# Patient Record
Sex: MALE | Race: WHITE | NOT HISPANIC OR LATINO | URBAN - METROPOLITAN AREA
[De-identification: names, ages, dates, MRNs, and addresses within clinical notes are randomized per-mention and may not be internally consistent; named-entity substitution may affect disease eponyms.]

---

## 2017-03-22 ENCOUNTER — APPOINTMENT (RX ONLY)
Dept: URBAN - METROPOLITAN AREA CLINIC 51 | Facility: CLINIC | Age: 67
Setting detail: DERMATOLOGY
End: 2017-03-22

## 2017-03-22 DIAGNOSIS — L57.0 ACTINIC KERATOSIS: ICD-10-CM

## 2017-03-22 DIAGNOSIS — Z85.828 PERSONAL HISTORY OF OTHER MALIGNANT NEOPLASM OF SKIN: ICD-10-CM

## 2017-03-22 DIAGNOSIS — L81.4 OTHER MELANIN HYPERPIGMENTATION: ICD-10-CM

## 2017-03-22 PROBLEM — E05.90 THYROTOXICOSIS, UNSPECIFIED WITHOUT THYROTOXIC CRISIS OR STORM: Status: ACTIVE | Noted: 2017-03-22

## 2017-03-22 PROCEDURE — ? COUNSELING

## 2017-03-22 PROCEDURE — 99213 OFFICE O/P EST LOW 20 MIN: CPT

## 2017-03-22 PROCEDURE — ? PRESCRIPTION

## 2017-03-22 RX ORDER — FLUOROURACIL 2 G/40G
CREAM TOPICAL QHS
Qty: 1 | Refills: 1 | Status: ERX

## 2017-03-22 ASSESSMENT — LOCATION DETAILED DESCRIPTION DERM
LOCATION DETAILED: LEFT ULNAR DORSAL HAND
LOCATION DETAILED: LEFT INFERIOR CENTRAL MALAR CHEEK
LOCATION DETAILED: RIGHT ULNAR DORSAL HAND
LOCATION DETAILED: RIGHT INFERIOR LATERAL FOREHEAD
LOCATION DETAILED: RIGHT SUPERIOR FOREHEAD

## 2017-03-22 ASSESSMENT — LOCATION SIMPLE DESCRIPTION DERM
LOCATION SIMPLE: RIGHT HAND
LOCATION SIMPLE: LEFT HAND
LOCATION SIMPLE: LEFT CHEEK
LOCATION SIMPLE: RIGHT FOREHEAD

## 2017-03-22 ASSESSMENT — LOCATION ZONE DERM
LOCATION ZONE: FACE
LOCATION ZONE: HAND

## 2017-03-22 NOTE — HPI: NON-MELANOMA SKIN CANCER F/U (HISTORY OF NMSC)
What Is The Reason For Today's Visit?: Surveillance against skin cancer recurrences
How Many Skin Cancers Have You Had?: more than one
Additional History: 6 month skin check
When Was Your Last Cancer Diagnosed?: BCC - left cheek - ED&C 06/29/2010

## 2017-11-30 RX ORDER — FLUOROURACIL 2 G/40G
CREAM TOPICAL QHS
Qty: 1 | Refills: 1 | Status: ERX

## 2018-02-02 ENCOUNTER — APPOINTMENT (RX ONLY)
Dept: URBAN - METROPOLITAN AREA CLINIC 51 | Facility: CLINIC | Age: 68
Setting detail: DERMATOLOGY
End: 2018-02-02

## 2018-02-02 DIAGNOSIS — L57.0 ACTINIC KERATOSIS: ICD-10-CM

## 2018-02-02 DIAGNOSIS — Z85.828 PERSONAL HISTORY OF OTHER MALIGNANT NEOPLASM OF SKIN: ICD-10-CM

## 2018-02-02 PROCEDURE — ? LIQUID NITROGEN

## 2018-02-02 PROCEDURE — ? SEPARATE AND IDENTIFIABLE DOCUMENTATION

## 2018-02-02 PROCEDURE — ? TREATMENT REGIMEN

## 2018-02-02 PROCEDURE — 99213 OFFICE O/P EST LOW 20 MIN: CPT | Mod: 25

## 2018-02-02 PROCEDURE — 17000 DESTRUCT PREMALG LESION: CPT

## 2018-02-02 PROCEDURE — ? COUNSELING

## 2018-02-02 ASSESSMENT — LOCATION ZONE DERM
LOCATION ZONE: FACE
LOCATION ZONE: HAND

## 2018-02-02 ASSESSMENT — LOCATION DETAILED DESCRIPTION DERM
LOCATION DETAILED: RIGHT SUPERIOR FOREHEAD
LOCATION DETAILED: LEFT LATERAL MALAR CHEEK
LOCATION DETAILED: RIGHT RADIAL DORSAL HAND

## 2018-02-02 ASSESSMENT — LOCATION SIMPLE DESCRIPTION DERM
LOCATION SIMPLE: RIGHT FOREHEAD
LOCATION SIMPLE: RIGHT HAND
LOCATION SIMPLE: LEFT CHEEK

## 2018-02-02 ASSESSMENT — PAIN INTENSITY VAS: HOW INTENSE IS YOUR PAIN 0 BEING NO PAIN, 10 BEING THE MOST SEVERE PAIN POSSIBLE?: NO PAIN

## 2018-02-02 NOTE — PROCEDURE: LIQUID NITROGEN
Post-Care Instructions: I reviewed with the patient in detail post-care instructions. Patient is to wear sunprotection, and avoid picking at any of the treated lesions. Pt may apply Vaseline to crusted or scabbing areas.
Number Of Freeze-Thaw Cycles: 2 freeze-thaw cycles
Detail Level: Zone
Consent: The patient's verbal consent was obtained including but not limited to risks of crusting, scabbing, blistering, scarring, darker or lighter pigmentary change, recurrence, incomplete removal and infection.
Duration Of Freeze Thaw-Cycle (Seconds): 5
Render Post-Care Instructions In Note?: yes

## 2018-02-02 NOTE — HPI: NON-MELANOMA SKIN CANCER F/U (HISTORY OF NMSC)
What Is The Reason For Today's Visit?: Surveillance against skin cancer recurrences
How Many Skin Cancers Have You Had?: more than one
Additional History: 6 month skin check
When Was Your Last Cancer Diagnosed?: left cheek- EDC- 06/29/2010

## 2018-12-13 ENCOUNTER — APPOINTMENT (RX ONLY)
Dept: URBAN - METROPOLITAN AREA CLINIC 51 | Facility: CLINIC | Age: 68
Setting detail: DERMATOLOGY
End: 2018-12-13

## 2018-12-13 DIAGNOSIS — Z85.828 PERSONAL HISTORY OF OTHER MALIGNANT NEOPLASM OF SKIN: ICD-10-CM

## 2018-12-13 DIAGNOSIS — L57.0 ACTINIC KERATOSIS: ICD-10-CM

## 2018-12-13 PROCEDURE — 99213 OFFICE O/P EST LOW 20 MIN: CPT

## 2018-12-13 PROCEDURE — ? COUNSELING

## 2018-12-13 PROCEDURE — ? TREATMENT REGIMEN

## 2018-12-13 ASSESSMENT — LOCATION DETAILED DESCRIPTION DERM
LOCATION DETAILED: LEFT FOREHEAD
LOCATION DETAILED: RIGHT INFERIOR CENTRAL MALAR CHEEK
LOCATION DETAILED: LEFT INFERIOR CENTRAL MALAR CHEEK

## 2018-12-13 ASSESSMENT — LOCATION SIMPLE DESCRIPTION DERM
LOCATION SIMPLE: LEFT CHEEK
LOCATION SIMPLE: RIGHT CHEEK
LOCATION SIMPLE: LEFT FOREHEAD

## 2018-12-13 ASSESSMENT — PAIN INTENSITY VAS: HOW INTENSE IS YOUR PAIN 0 BEING NO PAIN, 10 BEING THE MOST SEVERE PAIN POSSIBLE?: NO PAIN

## 2018-12-13 ASSESSMENT — LOCATION ZONE DERM: LOCATION ZONE: FACE

## 2018-12-13 NOTE — HPI: NON-MELANOMA SKIN CANCER F/U (HISTORY OF NMSC)
What Is The Reason For Today's Visit?: Surveillance against skin cancer recurrences
How Many Skin Cancers Have You Had?: more than one
When Was Your Last Cancer Diagnosed?: 2010

## 2019-02-05 NOTE — HISTORY AND PHYSICAL
DATE OF SERVICE:  



COLONOSCOPY HISTORY AND PHYSICAL



HISTORY OF PRESENT ILLNESS:

The patient is a 68-year-old white male referred for screening colonoscopy by

Dr. Hodges.  He believes that he had one other colonoscopy per Dr. Watson over

10 years ago.  There is no record in our electronic medical record, probably

predates this.  He is deemed to be of average risk as he is not aware of any

family history for colon cancer or polyps and he does not believe that he had

any polyps on his earlier colonoscopy.  He denies abdominal pain, bowel habit

change, melena or bright red blood per rectum.



PAST MEDICAL HISTORY:

Significant for hypertension and hyperlipidemia.  He underwent cardiac

catheterization in 2009 and was found to have no evidence for coronary artery

disease.



PAST SURGICAL HISTORY:

He has had bilateral inguinal hernia repair.



REVIEW OF SYSTEMS:

CONSTITUTIONAL:  He denies any night sweats, chills, fever or weight change.

CARDIOVASCULAR:  He denies chest pain or discomfort, shortness of breath,

palpitations or syncope.

RESPIRATORY:  The patient denies cough, dyspnea or pedal edema.

GASTROINTESTINAL:  As stated in the HPI.



PHYSICAL EXAMINATION:

GENERAL:  Reveals a well-appearing white male, in no acute distress.

HEENT:  Unremarkable with a Mallampati class 3 oropharyngeal configuration.

VITAL SIGNS:  Blood pressure 130/80, heart rate 66 and regular.

HEENT:  Oral cavity was clear.  No exudate was noted.

CHEST:  Clear to auscultation.

CARDIOVASCULAR:  Reveals a regular rate and rhythm without murmur, S3 or S4.

ABDOMEN:  Soft, supple without mass, organomegaly or tenderness.  Bowel sounds

are positive.  No bruits are noted.  No evidence to palpation is noted for

abdominal aortic aneurysm.

EXTREMITIES:  Reveal no cyanosis, clubbing or edema.



ASSESSMENT:

The patient was set up for screening colonoscopy on 02/15/2019.  Prep

instructions with split dose prep were given and questions were answered.



I thank you for the referral of this pleasant gentleman.





Job ID: [id number]

DocumentID: [id number]

Dictated Date:  01/31/2019 16:35:29

Transcription Date: 01/31/2019 17:02:29

Dictated By: NATA ALLEN MD

## 2019-02-08 ENCOUNTER — HOSPITAL ENCOUNTER (OUTPATIENT)
Dept: HOSPITAL 75 - PREOP | Age: 69
Discharge: HOME | End: 2019-02-08
Attending: INTERNAL MEDICINE
Payer: COMMERCIAL

## 2019-02-08 VITALS — HEIGHT: 67 IN | WEIGHT: 194 LBS | BODY MASS INDEX: 30.45 KG/M2

## 2019-02-08 DIAGNOSIS — Z01.818: Primary | ICD-10-CM

## 2019-02-15 ENCOUNTER — HOSPITAL ENCOUNTER (OUTPATIENT)
Dept: HOSPITAL 75 - ENDO | Age: 69
Discharge: HOME | End: 2019-02-15
Attending: INTERNAL MEDICINE
Payer: MEDICARE

## 2019-02-15 VITALS — SYSTOLIC BLOOD PRESSURE: 121 MMHG | DIASTOLIC BLOOD PRESSURE: 71 MMHG

## 2019-02-15 VITALS — DIASTOLIC BLOOD PRESSURE: 66 MMHG | SYSTOLIC BLOOD PRESSURE: 124 MMHG

## 2019-02-15 VITALS — WEIGHT: 194 LBS | BODY MASS INDEX: 30.45 KG/M2 | HEIGHT: 67 IN

## 2019-02-15 VITALS — SYSTOLIC BLOOD PRESSURE: 128 MMHG | DIASTOLIC BLOOD PRESSURE: 86 MMHG

## 2019-02-15 DIAGNOSIS — K57.30: ICD-10-CM

## 2019-02-15 DIAGNOSIS — N40.0: ICD-10-CM

## 2019-02-15 DIAGNOSIS — I10: ICD-10-CM

## 2019-02-15 DIAGNOSIS — E78.5: ICD-10-CM

## 2019-02-15 DIAGNOSIS — Z12.11: Primary | ICD-10-CM

## 2019-02-15 NOTE — XMS REPORT
CCD document using C-CDA

 Created on: 2019



Bharat Vuong

External Reference #: [id number]

: 1950

Sex: Male



Demographics







 Address  [address] [place]  [address]

 

 Home Phone  [phone]

 

 Preferred Language  English

 

 Marital Status  

 

 [employer] Affiliation  Unknown

 

 Race  White

 

 Ethnic Group  Not  or 





Author







 Author  [facility]

 

 Organization  [facility]

 

 Address  [address] [place]  [address]



 

 Phone  [phone]







Care Team Providers







 Care Team Member Name  Role  Phone

 

  PP  Unavailable

 

  CCM  Unavailable



                                            



Summary Purpose

          Interface Exchange                                                   
                 



Insurance Providers

                      





 Payer name                    Policy type / Coverage type                    
Covered party ID                    Effective Begin Date                    
Effective End Date                

 

 [employer]                    Medicare Part B                    [id number]
                    [id number]                    Unknown                

 

 [facility]                    Medicare Part B                    
[id number]                    [id number]                    Unknown                



                                                                               
         



Family history

                      



Mother            





 Diagnosis                    Age At Onset                

 

 Skin cancer                    Unknown                



            



Father            





 Diagnosis                    Age At Onset                

 

 Cancer                    Unknown                

 

 Skin cancer                    Unknown                

 

 Hypertension                    Unknown                



                                                                               
         



Social History

                      





 Social History Element                    Codes                    Description
                    Effective Dates                

 

 Marital status                    Unknown                    

Tessa                    2017                

 

 Number of children                    Unknown                    2            
        2017                

 

 Employment                    Unknown                    Retired              
      2017                

 

 Tobacco history                    SNOMED CT: 9439309                    
Former smoker

 1978                    2017                

 

 Alcohol history                    SNOMED CT: 716615                    
Currently drinks alcohol                    2017                

 

 Frequency of drinks                    SNOMED CT: 253435536                    
1-4 drinks per week

 3 wkly                    2017                



                                                                               
                                                           



Allergies, Adverse Reactions, Alerts

                      





 Substance                    Reaction                    Codes                
    Entered Date                    Inactivated Date                    Status 
               

 

                     * NO KNOWN DRUG ALLERGIES                                 
                          Unknown                    2017                
    No Inactive Date                    Active                



                                                                              



Past Medical History

                      





 Illness                    Codes                    Condition Status          
          Onset Date                    Resolved Date                

 

                     Encounter for screening for malignant neoplasm of prostate
                                      ICD-9: V76.44

ICD-10: Z12.5                    Active                    2019          
          Unknown                

 

                     Essential (primary) hypertension                          
            ICD-9: 401.1

ICD-10: I10                    Active                    2017            
        Unknown                

 

                     Hyperglycemia, unspecified                                
      ICD-9: 790.29

ICD-10: R73.9                    Active                    2018          
          Unknown                

 

                     Mixed hyperlipidemia                                      
ICD-9: 272.2

ICD-10: E78.2                    Active                    2017          
          Unknown                



                                                                               
                                       



Problems

                      





 Condition                    Codes                    Effective Dates         
           Condition Status                

 

                     Encounter for screening for malignant neoplasm of prostate
                                      ICD-9: V76.44

ICD-10: Z12.5                    2019                    Active          
      

 

                     Essential (primary) hypertension                          
            ICD-9: 401.1

ICD-10: I10                    2017                    Active            
    

 

                     Hyperglycemia, unspecified                                
      ICD-9: 790.29

ICD-10: R73.9                    2018                    Active          
      

 

                     Mixed hyperlipidemia                                      
ICD-9: 272.2

ICD-10: E78.2                    2017                    Active          
      



                                                                               
                                       



Medications

                      





 Medication                    Codes                    Instructions           
         Start Date                    Stop Date                    Status     
               Fill Instructions                

 

                     rosuvastatin 10 mg tablet                                 
     RxNorm: 115216                    TAKE 1 TABLET DAILY                    2019                    Active               
                     

 

                     bisoprolol 10 mg-hydrochlorothiazide 6.25 mg tablet       
                               RxNorm: 076936                    1 Tablet(s) PO 
daily                    2017              
      Inactive                                    

 

                     rosuvastatin 10 mg tablet                                 
     RxNorm: 141335                    1 Tablet(s) PO daily                    
2017                    Inactive           
                         

 

                     bisoprolol 10 mg-hydrochlorothiazide 6.25 mg tablet       
                               RxNorm: 301756                    1 Tablet(s) PO 
daily                    2017                    07/10/2017              
      Inactive                                    

 

                     bisoprolol 10 mg-hydrochlorothiazide 6.25 mg tablet       
                               RxNorm: 860241                    1 Tablet(s) PO 
daily                    2017              
      Inactive                                    

 

                     rosuvastatin 10 mg tablet                                 
     RxNorm: 303151                    1 Tablet(s) PO daily                    
2017                    Inactive           
                         

 

                     rosuvastatin 10 mg tablet                                 
     RxNorm: 258808                    1 Tablet(s) PO daily                    
No Start Date                    07/10/2017                    Inactive        
                            



                                                                               
                                                           



Medication Administered

          No Medication Administered data                                      
                              



Immunizations

          No Immunization data                                                 
         



Assessments

                      





 Condition                    Codes                    Effective Dates         
       

 

 Encounter for screening for malignant neoplasm of prostate                    
ICD-10: Z12.5

ICD-9: V76.44                    2019                

 

 Essential (primary) hypertension                    ICD-10: I10

ICD-9: 401.1                    2019                

 

 Mixed hyperlipidemia                    ICD-10: E78.2

ICD-9: 272.2                    2019                

 

 Hyperglycemia, unspecified                    ICD-10: R73.9

ICD-9: 790.29                    2018                



                                                                    



Reason For Visit

                      





 Reason For Visit                    Effective Dates                    Notes  
              

 

 hypertension                    2018                                    

 

 hypertension                    2018                                    

 

 hypertension                    2017                                    



                                                                              



Results

                      





 Observation                    Observation Code                    Item       
             Item Code                    Result                    Date       
         

 

 Comp Metabolic                    Yet204                    NA                
                        140 mEq/L                    2018                

 

 Comp Metabolic                    Rtv483                    K                 
                       5.1 mEq/L                    2018                

 

 Comp Metabolic                    Zoj230                    CL                
                        103 mEq/L                    2018                

 

 Comp Metabolic                    Odc283                    CO2               
                         31.0 mEq/L                    2018              
  

 

 Comp Metabolic                    Onz230                    ANION GAP         
                               11                     2018                

 

 Comp Metabolic                    Kuw704                    GLUCOSE           
                             134 mg/dL                    2018           
     

 

 Comp Metabolic                    Crq378                    Creat             
                           1.0 mg/dL                    2018             
   

 

 Comp Metabolic                    Dmn900                    eGFR              
                          84 ml/min/1.73m2                    2018       
         

 

 Comp Metabolic                    Ett033                    BUN               
                         22 mg/dL                    2018                

 

 Comp Metabolic                    Ffk811                    B/C Ratio         
                               23.2 Ratio                    2018        
        

 

 Comp Metabolic                    Xtt887                    CALCIUM           
                             9.6 mg/dL                    2018           
     

 

 Comp Metabolic                    Sbv989                    ALK PHOS          
                              37 U/L                    2018             
   

 

 Comp Metabolic                    Vdm543                    AST(SGOT)         
                               19 U/L                    2018            
    

 

 Comp Metabolic                    Xdm975                    ALT(SGPT)         
                               28 U/L                    2018            
    

 

 Comp Metabolic                    Yge404                    BILI T            
                            0.8 mg/dL                    2018            
    

 

 Comp Metabolic                    Owr344                    ALBUMIN           
                             4.4 g/dL                    2018            
    

 

 Comp Metabolic                    Sbc991                    TPRO              
                          6.8 g/dL                    2018                

 

 Comp Metabolic                    Txl930                    GLOB              
                          2.4 g/dL                    2018                

 

 Comp Metabolic                    Qrs258                    A/G Ratio         
                               1.8 Ratio                    2018         
       

 

 Comp Metabolic                    Xpe552                    Osmo              
                          285 mOsmo                    2018              
  

 

 Cbc With Differential                    Ord2                    WBC          
                              5.78 K/ul                    2018          
      

 

 Cbc With Differential                    Ord2                    RBC          
                              4.89 M/ul                    2018          
      

 

 Cbc With Differential                    Ord2                    HGB          
                              16.5 g/dl                    2018          
      

 

 Cbc With Differential                    Ord2                    HCT          
                              45.7 %                    2018             
   

 

 Cbc With Differential                    Ord2                    Neut%        
                                56.2 %                    2018           
     

 

 Cbc With Differential                    Ord2                    Lymph%       
                                 32.9 %                    2018          
      

 

 Cbc With Differential                    Ord2                    MCV          
                              93.5 fl                    2018            
    

 

 Cbc With Differential                    Ord2                    MCH          
                              33.7 pg                    2018            
    

 

 Cbc With Differential                    Ord2                    Mono%        
                                9.3 %                    2018            
    

 

 Cbc With Differential                    Ord2                    MCHC         
                               36.1 pg                    2018           
     

 

 Cbc With Differential                    Ord2                    Eos%         
                               1.4 %                    2018             
   

 

 Cbc With Differential                    Ord2                    PLT          
                              194 K/ul                    2018           
     

 

 Cbc With Differential                    Ord2                    Baso%        
                                0.2 %                    2018            
    

 

 Cbc With Differential                    Ord2                    Neut ABS#    
                                    3.25 K/ul                    2018    
            

 

 Cbc With Differential                    Ord2                    RDW          
                              12.2 %                    2018             
   

 

 Cbc With Differential                    Ord2                    Lymph ABS#   
                                     1.90 K/ul                    2018   
             

 

 Cbc With Differential                    Ord2                    Mono ABS#    
                                    0.5 K/ul                    2018     
           

 

 Cbc With Differential                    Ord2                    Eos ABS#     
                                   0.1 K/ul                    2018      
          

 

 Cbc With Differential                    Ord2                    Baso ABS#    
                                    0.0 K/ul                    2018     
           

 

 Lipid                    Ord30                    CHOL                        
                147 mg/dL                    2018                

 

 Lipid                    Ord30                    HDL                         
               50.0 mg/dl                    2018                

 

 Lipid                    Ord30                    TRIG                        
                91 mg/dL                    2018                

 

 Lipid                    Ord30                    LDL                         
               79 mg/dL                    2018                

 

 Lipid                    Ord30                    C/HDL                       
                 2.9 Ratio                    2018                

 

 %Hba1C                    Vge357                    % HbA1c                    
02746-5                    5.5 %                    2018                

 

 %Hba1C                    Was020                    Gluc Ave                  
                      111 mg/dL                    2018                

 

 Lipid                    Ord30                    CHOL                        
                154 mg/dL                    2018                

 

 Lipid                    Ord30                    HDL                         
               49.0 mg/dl                    2018                

 

 Lipid                    Ord30                    TRIG                        
                104 mg/dL                    2018                

 

 Lipid                    Ord30                    LDL                         
               84 mg/dL                    2018                

 

 Lipid                    Ord30                    C/HDL                       
                 3.1 Ratio                    2018                

 

 Tsh                    Ord6                    hTSH II                        
                2.76 uIU/mL                    2018                

 

 Comp Metabolic                    Eya354                    NA                
                        141 mEq/L                    2018                

 

 Comp Metabolic                    Zut169                    K                 
                       4.7 mEq/L                    2018                

 

 Comp Metabolic                    Yfr708                    CL                
                        103 mEq/L                    2018                

 

 Comp Metabolic                    Pfe677                    CO2               
                         31.0 mEq/L                    2018              
  

 

 Comp Metabolic                    Emf227                    ANION GAP         
                               12                     2018                

 

 Comp Metabolic                    Zgu209                    GLUCOSE           
                             135 mg/dL                    2018           
     

 

 Comp Metabolic                    Usu021                    Creat             
                           0.9 mg/dL                    2018             
   

 

 Comp Metabolic                    Jvk860                    eGFR              
                          90 ml/min/1.73m2                    2018       
         

 

 Comp Metabolic                    Aji325                    BUN               
                         16 mg/dL                    2018                

 

 Comp Metabolic                    Cus004                    B/C Ratio         
                               17.8 Ratio                    2018        
        

 

 Comp Metabolic                    Lyr292                    CALCIUM           
                             9.7 mg/dL                    2018           
     

 

 Comp Metabolic                    Xmo224                    ALK PHOS          
                              42 U/L                    2018             
   

 

 Comp Metabolic                    Ajm030                    AST(SGOT)         
                               24 U/L                    2018            
    

 

 Comp Metabolic                    Ecu162                    ALT(SGPT)         
                               36 U/L                    2018            
    

 

 Comp Metabolic                    Xqb265                    BILI T            
                            1.1 mg/dL                    2018            
    

 

 Comp Metabolic                    Mjt409                    ALBUMIN           
                             4.5 g/dL                    2018            
    

 

 Comp Metabolic                    Jks437                    TPRO              
                          6.7 g/dL                    2018                

 

 Comp Metabolic                    Zal122                    GLOB              
                          2.3 g/dL                    2018                

 

 Comp Metabolic                    Equ884                    A/G Ratio         
                               2.0 Ratio                    2018         
       

 

 Comp Metabolic                    Fan501                    Osmo              
                          284 mOsmo                    2018              
  

 

 Cbc With Differential                    Ord2                    WBC          
                              5.15 K/ul                    2018          
      

 

 Cbc With Differential                    Ord2                    RBC          
                              4.75 M/ul                    2018          
      

 

 Cbc With Differential                    Ord2                    HGB          
                              16.1 g/dl                    2018          
      

 

 Cbc With Differential                    Ord2                    HCT          
                              44.6 %                    2018             
   

 

 Cbc With Differential                    Ord2                    Neut%        
                                55.7 %                    2018           
     

 

 Cbc With Differential                    Ord2                    MCV          
                              93.9 fl                    2018            
    

 

 Cbc With Differential                    Ord2                    Lymph%       
                                 34.2 %                    2018          
      

 

 Cbc With Differential                    Ord2                    Mono%        
                                8.0 %                    2018            
    

 

 Cbc With Differential                    Ord2                    MCH          
                              33.9 pg                    2018            
    

 

 Cbc With Differential                    Ord2                    Eos%         
                               1.7 %                    2018             
   

 

 Cbc With Differential                    Ord2                    MCHC         
                               36.1 pg                    2018           
     

 

 Cbc With Differential                    Ord2                    Baso%        
                                0.4 %                    2018            
    

 

 Cbc With Differential                    Ord2                    PLT          
                              182 K/ul                    2018           
     

 

 Cbc With Differential                    Ord2                    Neut ABS#    
                                    2.87 K/ul                    2018    
            

 

 Cbc With Differential                    Ord2                    RDW          
                              12.4 %                    2018             
   

 

 Cbc With Differential                    Ord2                    Lymph ABS#   
                                     1.76 K/ul                    2018   
             

 

 Cbc With Differential                    Ord2                    Mono ABS#    
                                    0.4 K/ul                    2018     
           

 

 Cbc With Differential                    Ord2                    Eos ABS#     
                                   0.1 K/ul                    2018      
          

 

 Cbc With Differential                    Ord2                    Bas[facility] ABS#    
                                    0.0 K/ul                    2018     
           

 

 %Hba1C                    [id number]                    % HbA1c                    
84189-6                    5.3 %                    2018                

 

 %Hba1C                    [id number]                    Gluc Ave                  
                      105 mg/dL                    2018                



                                                                               
                                                                               



Review of Systems

                      





 System                    Result                    Effective Dates           
     

 

 Constitutional                    No recent illness                    2018                

 

 Constitutional                    No chills                    2018     
           

 

 Constitutional                    No fatigue                    2018    
            

 

 Constitutional                    No fever                    2018      
          

 

 Constitutional                    No insomnia                    2018   
             

 

 Constitutional                    No malaise                    2018    
            

 

 Eyes                    No vision change                    2018        
        

 

 Ears/Nose/Throat/Neck                    No dental pain                    2018                

 

 Ears/Nose/Throat/Neck                    No dizziness                    2018                

 

 Ears/Nose/Throat/Neck                    No dysphagia                    2018                

 

 Ears/Nose/Throat/Neck                    No headache                    2018                

 

 Ears/Nose/Throat/Neck                    No hearing loss                    2018                

 

 Ears/Nose/Throat/Neck                    No nasal allergies                    
2018                

 

 Ears/Nose/Throat/Neck                    No sore throat                    2018                

 

 Ears/Nose/Throat/Neck                    No postnasal drip                    
2018                

 

 Ears/Nose/Throat/Neck                    No sinus congestion                  
  2018                

 

 Cardiovascular                    No chest pain/pressure                    2018                

 

 Cardiovascular                    No dyspnea                    2018    
            

 

 Cardiovascular                    No edema                    2018      
          

 

 Cardiovascular                    No exercise intolerance                                    

 

 Cardiovascular                    No fatigue                    2018    
            

 

 Cardiovascular                    No near-syncope/dizziness                    
2018                

 

 Respiratory                    No chest tightness                    2018                

 

 Respiratory                    No cough                    2018         
       

 

 Respiratory                    No dyspnea                    2018       
         

 

 Respiratory                    No pedal edema                    2018   
             

 

 Gastrointestinal                    No abdominal pain                    2018                

 

 Gastrointestinal                    No constipation                    2018                

 

 Gastrointestinal                    No diarrhea                    2018 
               

 

 Gastrointestinal                    No gastroesophageal reflux                
    2018                

 

 Gastrointestinal                    No nausea                    2018   
             

 

 Gastrointestinal                    No vomiting                    2018 
               

 

 Genitourinary/Nephrology                    No dysuria                                    

 

 Genitourinary/Nephrology                    No nocturia                    2018                

 

 Genitourinary/Nephrology                    No urinary incontinence           
         2018                

 

 Musculoskeletal                    No stiffness                    2018 
               

 

 Musculoskeletal                    No swelling                    2018  
              

 

 Musculoskeletal                    No muscle weakness                    2018                

 

 Musculoskeletal                    No myalgias                    2018  
              

 

 Dermatologic                    No rash                    2018         
       

 

 Dermatologic                    No sores                    2018        
        

 

 Neurologic                    No dizziness                    2018      
          

 

 Neurologic                    No headache                    2018       
         

 

 Neurologic                    No neck pain                    2018      
          

 

 Neurologic                    No syncope                    2018        
        

 

 Psychiatric                    No anxiety                    2018       
         

 

 Psychiatric                    No depression                    2018    
            

 

 Constitutional                    No recent illness                    2018                

 

 Constitutional                    No chills                    2018     
           

 

 Constitutional                    No fatigue                    2018    
            

 

 Constitutional                    No fever                    2018      
          

 

 Constitutional                    No insomnia                    2018   
             

 

 Constitutional                    No malaise                    2018    
            

 

 Eyes                    No vision change                    2018        
        

 

 Ears/Nose/Throat/Neck                    No dental pain                    2018                

 

 Ears/Nose/Throat/Neck                    No dizziness                    2018                

 

 Ears/Nose/Throat/Neck                    No dysphagia                    2018                

 

 Ears/Nose/Throat/Neck                    No headache                    2018                

 

 Ears/Nose/Throat/Neck                    No hearing loss                    2018                

 

 Ears/Nose/Throat/Neck                    No nasal allergies                    
2018                

 

 Ears/Nose/Throat/Neck                    No sore throat                    2018                

 

 Ears/Nose/Throat/Neck                    No postnasal drip                    
2018                

 

 Ears/Nose/Throat/Neck                    No sinus congestion                  
  2018                

 

 Cardiovascular                    No chest pain/pressure                    2018                

 

 Cardiovascular                    No dyspnea                    2018    
            

 

 Cardiovascular                    No edema                    2018      
          

 

 Cardiovascular                    No exercise intolerance                                    

 

 Cardiovascular                    No fatigue                    2018    
            

 

 Cardiovascular                    No near-syncope/dizziness                    
2018                

 

 Respiratory                    No chest tightness                    2018                

 

 Respiratory                    No cough                    2018         
       

 

 Respiratory                    No dyspnea                    2018       
         

 

 Respiratory                    No pedal edema                    2018   
             

 

 Gastrointestinal                    No abdominal pain                    2018                

 

 Gastrointestinal                    No constipation                    2018                

 

 Gastrointestinal                    No diarrhea                    2018 
               

 

 Gastrointestinal                    No gastroesophageal reflux                
    2018                

 

 Gastrointestinal                    No nausea                    2018   
             

 

 Gastrointestinal                    No vomiting                    2018 
               

 

 Genitourinary/Nephrology                    No dysuria                                    

 

 Genitourinary/Nephrology                    No nocturia                    2018                

 

 Genitourinary/Nephrology                    No urinary incontinence           
         2018                

 

 Musculoskeletal                    No stiffness                    2018 
               

 

 Musculoskeletal                    No swelling                    2018  
              

 

 Musculoskeletal                    No muscle weakness                    2018                

 

 Musculoskeletal                    No myalgias                    2018  
              

 

 Dermatologic                    No rash                    2018         
       

 

 Dermatologic                    No sores                    2018        
        

 

 Neurologic                    No dizziness                    2018      
          

 

 Neurologic                    No headache                    2018       
         

 

 Neurologic                    No neck pain                    2018      
          

 

 Neurologic                    No syncope                    2018        
        

 

 Psychiatric                    No anxiety                    2018       
         

 

 Psychiatric                    No depression                    2018    
            

 

 Constitutional                    No recent illness                    2017                

 

 Constitutional                    No chills                    2017     
           

 

 Constitutional                    No fatigue                    2017    
            

 

 Constitutional                    No fever                    2017      
          

 

 Constitutional                    No insomnia                    2017   
             

 

 Constitutional                    No malaise                    2017    
            

 

 Eyes                    No vision change                    2017        
        

 

 Ears/Nose/Throat/Neck                    No dental pain                    2017                

 

 Ears/Nose/Throat/Neck                    No dizziness                    2017                

 

 Ears/Nose/Throat/Neck                    No dysphagia                    2017                

 

 Ears/Nose/Throat/Neck                    No headache                    2017                

 

 Ears/Nose/Throat/Neck                    No hearing loss                    2017                

 

 Ears/Nose/Throat/Neck                    No nasal allergies                    
2017                

 

 Ears/Nose/Throat/Neck                    No sore throat                    2017                

 

 Ears/Nose/Throat/Neck                    No postnasal drip                    
2017                

 

 Ears/Nose/Throat/Neck                    No sinus congestion                  
  2017                

 

 Cardiovascular                    No chest pain/pressure                    2017                

 

 Cardiovascular                    No dyspnea                    2017    
            

 

 Cardiovascular                    No edema                    2017      
          

 

 Cardiovascular                    No exercise intolerance                                    

 

 Cardiovascular                    No fatigue                    2017    
            

 

 Cardiovascular                    No near-syncope/dizziness                    
2017                

 

 Respiratory                    No chest tightness                    2017                

 

 Respiratory                    No cough                    2017         
       

 

 Respiratory                    No dyspnea                    2017       
         

 

 Respiratory                    No pedal edema                    2017   
             

 

 Gastrointestinal                    No abdominal pain                    2017                

 

 Gastrointestinal                    No constipation                    2017                

 

 Gastrointestinal                    No diarrhea                    2017 
               

 

 Gastrointestinal                    No gastroesophageal reflux                
    2017                

 

 Gastrointestinal                    No nausea                    2017   
             

 

 Gastrointestinal                    No vomiting                    2017 
               

 

 Genitourinary/Nephrology                    No dysuria                                    

 

 Genitourinary/Nephrology                    No nocturia                    2017                

 

 Genitourinary/Nephrology                    No urinary incontinence           
         2017                

 

 Musculoskeletal                    No stiffness                    2017 
               

 

 Musculoskeletal                    No swelling                    2017  
              

 

 Musculoskeletal                    No muscle weakness                    2017                

 

 Musculoskeletal                    No myalgias                    2017  
              

 

 Dermatologic                    No rash                    2017         
       

 

 Dermatologic                    No sores                    2017        
        

 

 Neurologic                    No dizziness                    2017      
          

 

 Neurologic                    No headache                    2017       
         

 

 Neurologic                    No neck pain                    2017      
          

 

 Neurologic                    No syncope                    2017        
        

 

 Psychiatric                    No anxiety                    2017       
         

 

 Psychiatric                    No depression                    2017    
            



                                                                    



Physical Exam

                      





 Exam Name                    System Name                    Item Name         
           Status                    Result                    Effective Dates 
                   Notes                

 

 Full Exam - General                     Constitutional                     
general appearance                    Development:                    well 
developed                    2018                    None                

 

 Full Exam - General                     Constitutional                     
general appearance                    Development:                    appears 
stated age                    2018                    None                

 

 Full Exam - General                     Constitutional                     
general appearance                    Hygiene/Attention to Grooming:           
         good hygiene                    2018                    None    
            

 

 Full Exam - General                     Eyes                    conjunctiva
/eyelids                    Overall:                    conjunctiva clear      
              2018                    None                

 

 Full Exam - General                     Eyes                    conjunctiva
/eyelids                    Overall:                    cornea clear           
         2018                    None                

 

 Full Exam - General                     Eyes                    conjunctiva
/eyelids                    Overall:                    eyelids normal         
           2018                    None                

 

 Full Exam - General                     Eyes                    pupils and 
irises                    Overall:                    pupils equal, round, 
reactive to light and accomodation                    2018               
     None                

 

 Full Exam - General                     Ears/Nose/Throat                  
  otoscopic exam                    Overall:                    external 
auditory canals clear                    2018                    None    
            

 

 Full Exam - General                     Ears/Nose/Throat                  
  otoscopic exam                    Overall:                    tympanic 
membranes clear                    2018                    None          
      

 

 Full Exam - General                     Ears/Nose/Throat                  
  lips/teeth/gingiva                    Overall:                    benign lips
                    2018                    None                

 

 Full Exam - General                     Ears/Nose/Throat                  
  lips/teeth/gingiva                    Overall:                    normal 
dentition                    2018                    None                

 

 Full Exam - General                     Ears/Nose/Throat                  
  oral cavity/pharynx/larynx                     Overall:                    
oral mucosa clear                    2018                    None        
        

 

 Full Exam - General 1993                    Ears/Nose/Throat                  
  oral cavity/pharynx/larynx                     Overall:                    
oropharyngeal mucosa clear                    2018                    
None                

 

 Full Exam - General                     Ears/Nose/Throat                  
  oral cavity/pharynx/larynx                     Overall:                    
hypopharynx benign                    2018                    None       
         

 

 Full Exam - General                     Ears/Nose/Throat                  
  oral cavity/pharynx/larynx                     Overall:                    no 
masses                    2018                    None                

 

 Full Exam - General                     Respiratory                    
auscultation                    Overall:                    breath sounds clear 
bilaterally                    2018                    None              
  

 

 Full Exam - General                     Respiratory                    
respiratory effort/rhythm                    Overall:                    no 
retractions                    2018                    None              
  

 

 Full Exam - General                     Respiratory                    
respiratory effort/rhythm                    Overall:                    normal 
rate                    2018                    None                

 

 Full Exam - General                     Cardiovascular                    
extremities                    Overall:                    no clubbing         
           2018                    None                

 

 Full Exam - General                     Cardiovascular                    
auscultation of heart                    Overall:                    regular 
rate                    2018                    None                

 

 Full Exam - General                     Cardiovascular                    
auscultation of heart                    Overall:                    normal 
heart sounds                    2018                    None             
   

 

 Full Exam - General                     Abdomen                    
abdominal exam                    Overall:                    no tenderness    
                2018                    None                

 

 Full Exam - General                     Abdomen                    
abdominal exam                    Overall:                    normal bowel 
sounds                    2018                    None                

 

 Full Exam - General                     Lymphatic                    neck 
nodes                    Overall:                    anterior cervical chain 
benign                    2018                    None                

 

 Full Exam - General                     Lymphatic                    neck 
nodes                    Overall:                    posterior cervical chain 
benign                    2018                    None                

 

 Full Exam - General                     Musculoskeletal                    
spine, ribs and pelvis                    Overall:                    spine 
benign                    2018                    None                

 

 Full Exam - General                     Musculoskeletal                    
spine, ribs and pelvis                    Overall:                    
sacroiliac joint benign                    2018                    None  
              

 

 Full Exam - General                     Musculoskeletal                    
spine, ribs and pelvis                    Overall:                    good 
posture                    2018                    None                

 

 Full Exam - General                     Musculoskeletal                    
head and neck                    Overall:                    head atraumatic   
                 2018                    None                

 

 Full Exam - General                     Musculoskeletal                    
head and neck                    Overall:                    cervical spine 
benign                    2018                    None                

 

 Full Exam - General                     Integument                    
inspection of skin                    Overall:                    few scattered 
moles, no gross abnormalities                    2018                    
None                

 

 Full Exam - General                     Neurologic                    deep 
tendon reflexes                    Overall:                    deep tendon 
reflexes intact                    2018                    None          
      

 

 Full Exam - General                     Neurologic                    
cranial nerves                    Overall:                    crainial nerves 2 
- 12 grossly intact                    2018                    None      
          

 

 Full Exam - General                     Psychiatric                    
orientation/consciousness                    Overall:                    
oriented to person, place and time                    2018               
     None                

 

 Full Exam - General                     Psychiatric                    
mood and affect                    Overall:                    normal mood and 
affect                    2018                    None                

 

 Full Exam - General                     Constitutional                     
general appearance                    Development:                    well 
developed                    2018                    None                

 

 Full Exam - General                     Constitutional                     
general appearance                    Development:                    appears 
stated age                    2018                    None                

 

 Full Exam - General                     Constitutional                     
general appearance                    Hygiene/Attention to Grooming:           
         good hygiene                    2018                    None    
            

 

 Full Exam - General                     Eyes                    conjunctiva
/eyelids                    Overall:                    conjunctiva clear      
              2018                    None                

 

 Full Exam - General                     Eyes                    conjunctiva
/eyelids                    Overall:                    cornea clear           
         2018                    None                

 

 Full Exam - General                     Eyes                    conjunctiva
/eyelids                    Overall:                    eyelids normal         
           2018                    None                

 

 Full Exam - General                     Eyes                    pupils and 
irises                    Overall:                    pupils equal, round, 
reactive to light and accomodation                    2018               
     None                

 

 Full Exam - General                     Ears/Nose/Throat                  
  otoscopic exam                    Overall:                    external 
auditory canals clear                    2018                    None    
            

 

 Full Exam - General                     Ears/Nose/Throat                  
  otoscopic exam                    Overall:                    tympanic 
membranes clear                    2018                    None          
      

 

 Full Exam - General                     Ears/Nose/Throat                  
  lips/teeth/gingiva                    Overall:                    benign lips
                    2018                    None                

 

 Full Exam - General                     Ears/Nose/Throat                  
  lips/teeth/gingiva                    Overall:                    normal 
dentition                    2018                    None                

 

 Full Exam - General                     Ears/Nose/Throat                  
  oral cavity/pharynx/larynx                     Overall:                    
oral mucosa clear                    2018                    None        
        

 

 Full Exam - General                     Ears/Nose/Throat                  
  oral cavity/pharynx/larynx                     Overall:                    
oropharyngeal mucosa clear                    2018                    
None                

 

 Full Exam - General                     Ears/Nose/Throat                  
  oral cavity/pharynx/larynx                     Overall:                    
hypopharynx benign                    2018                    None       
         

 

 Full Exam - General                     Ears/Nose/Throat                  
  oral cavity/pharynx/larynx                     Overall:                    no 
masses                    2018                    None                

 

 Full Exam - General                     Respiratory                    
auscultation                    Overall:                    breath sounds clear 
bilaterally                    2018                    None              
  

 

 Full Exam - General                     Respiratory                    
respiratory effort/rhythm                    Overall:                    no 
retractions                    2018                    None              
  

 

 Full Exam - General                     Respiratory                    
respiratory effort/rhythm                    Overall:                    normal 
rate                    2018                    None                

 

 Full Exam - General                     Cardiovascular                    
extremities                    Overall:                    no clubbing         
           2018                    None                

 

 Full Exam - General                     Cardiovascular                    
auscultation of heart                    Overall:                    regular 
rate                    2018                    None                

 

 Full Exam - General                     Cardiovascular                    
auscultation of heart                    Overall:                    normal 
heart sounds                    2018                    None             
   

 

 Full Exam - General                     Abdomen                    
abdominal exam                    Overall:                    no tenderness    
                2018                    None                

 

 Full Exam - General                     Abdomen                    
abdominal exam                    Overall:                    normal bowel 
sounds                    2018                    None                

 

 Full Exam - General                     Lymphatic                    neck 
nodes                    Overall:                    anterior cervical chain 
benign                    2018                    None                

 

 Full Exam - General                     Lymphatic                    neck 
nodes                    Overall:                    posterior cervical chain 
benign                    2018                    None                

 

 Full Exam - General                     Musculoskeletal                    
spine, ribs and pelvis                    Overall:                    spine 
benign                    2018                    None                

 

 Full Exam - General                     Musculoskeletal                    
spine, ribs and pelvis                    Overall:                    
sacroiliac joint benign                    2018                    None  
              

 

 Full Exam - General                     Musculoskeletal                    
spine, ribs and pelvis                    Overall:                    good 
posture                    2018                    None                

 

 Full Exam - General                     Musculoskeletal                    
head and neck                    Overall:                    head atraumatic   
                 2018                    None                

 

 Full Exam - General                     Musculoskeletal                    
head and neck                    Overall:                    cervical spine 
benign                    2018                    None                

 

 Full Exam - General                     Integument                    
inspection of skin                    Overall:                    few scattered 
moles, no gross abnormalities                    2018                    
None                

 

 Full Exam - General                     Neurologic                    deep 
tendon reflexes                    Overall:                    deep tendon 
reflexes intact                    2018                    None          
      

 

 Full Exam - General                     Neurologic                    
cranial nerves                    Overall:                    crainial nerves 2 
- 12 grossly intact                    2018                    None      
          

 

 Full Exam - General                     Psychiatric                    
orientation/consciousness                    Overall:                    
oriented to person, place and time                    2018               
     None                

 

 Full Exam - General                     Psychiatric                    
mood and affect                    Overall:                    normal mood and 
affect                    2018                    None                

 

 Full Exam - General                     Constitutional                     
general appearance                    Development:                    well 
developed                    2017                    None                

 

 Full Exam - General                     Constitutional                     
general appearance                    Development:                    appears 
stated age                    2017                    None                

 

 Full Exam - General                     Constitutional                     
general appearance                    Hygiene/Attention to Grooming:           
         good hygiene                    2017                    None    
            

 

 Full Exam - General                     Eyes                    conjunctiva
/eyelids                    Overall:                    conjunctiva clear      
              2017                    None                

 

 Full Exam - General                     Eyes                    conjunctiva
/eyelids                    Overall:                    cornea clear           
         2017                    None                

 

 Full Exam - General                     Eyes                    conjunctiva
/eyelids                    Overall:                    eyelids normal         
           2017                    None                

 

 Full Exam - General                     Eyes                    pupils and 
irises                    Overall:                    pupils equal, round, 
reactive to light and accomodation                    2017               
     None                

 

 Full Exam - General                     Ears/Nose/Throat                  
  otoscopic exam                    Overall:                    external 
auditory canals clear                    2017                    None    
            

 

 Full Exam - General                     Ears/Nose/Throat                  
  otoscopic exam                    Overall:                    tympanic 
membranes clear                    2017                    None          
      

 

 Full Exam - General                     Ears/Nose/Throat                  
  lips/teeth/gingiva                    Overall:                    benign lips
                    2017                    None                

 

 Full Exam - General                     Ears/Nose/Throat                  
  lips/teeth/gingiva                    Overall:                    normal 
dentition                    2017                    None                

 

 Full Exam - General                     Ears/Nose/Throat                  
  oral cavity/pharynx/larynx                     Overall:                    
oral mucosa clear                    2017                    None        
        

 

 Full Exam - General                     Ears/Nose/Throat                  
  oral cavity/pharynx/larynx                     Overall:                    
oropharyngeal mucosa clear                    2017                    
None                

 

 Full Exam - General                     Ears/Nose/Throat                  
  oral cavity/pharynx/larynx                     Overall:                    
hypopharynx benign                    2017                    None       
         

 

 Full Exam - General                     Ears/Nose/Throat                  
  oral cavity/pharynx/larynx                     Overall:                    no 
masses                    2017                    None                

 

 Full Exam - General                     Respiratory                    
auscultation                    Overall:                    breath sounds clear 
bilaterally                    2017                    None              
  

 

 Full Exam - General                     Respiratory                    
respiratory effort/rhythm                    Overall:                    no 
retractions                    2017                    None              
  

 

 Full Exam - General                     Respiratory                    
respiratory effort/rhythm                    Overall:                    normal 
rate                    2017                    None                

 

 Full Exam - General                     Cardiovascular                    
extremities                    Overall:                    no clubbing         
           2017                    None                

 

 Full Exam - General                     Cardiovascular                    
auscultation of heart                    Overall:                    regular 
rate                    2017                    None                

 

 Full Exam - General                     Cardiovascular                    
auscultation of heart                    Overall:                    normal 
heart sounds                    2017                    None             
   

 

 Full Exam - General                     Abdomen                    
abdominal exam                    Overall:                    no tenderness    
                2017                    None                

 

 Full Exam - General                     Abdomen                    
abdominal exam                    Overall:                    normal bowel 
sounds                    2017                    None                

 

 Full Exam - General                     Lymphatic                    neck 
nodes                    Overall:                    anterior cervical chain 
benign                    2017                    None                

 

 Full Exam - General                     Lymphatic                    neck 
nodes                    Overall:                    posterior cervical chain 
benign                    2017                    None                

 

 Full Exam - General                     Musculoskeletal                    
spine, ribs and pelvis                    Overall:                    spine 
benign                    2017                    None                

 

 Full Exam - General                     Musculoskeletal                    
spine, ribs and pelvis                    Overall:                    
sacroiliac joint benign                    2017                    None  
              

 

 Full Exam - General                     Musculoskeletal                    
spine, ribs and pelvis                    Overall:                    good 
posture                    2017                    None                

 

 Full Exam - General                     Musculoskeletal                    
head and neck                    Overall:                    head atraumatic   
                 2017                    None                

 

 Full Exam - General                     Musculoskeletal                    
head and neck                    Overall:                    cervical spine 
benign                    2017                    None                

 

 Full Exam - General                     Integument                    
inspection of skin                    Overall:                    few scattered 
moles, no gross abnormalities                    2017                    
None                

 

 Full Exam - General                     Neurologic                    deep 
tendon reflexes                    Overall:                    deep tendon 
reflexes intact                    2017                    None          
      

 

 Full Exam - General                     Neurologic                    
cranial nerves                    Overall:                    crainial nerves 2 
- 12 grossly intact                    2017                    None      
          

 

 Full Exam - General                     Psychiatric                    
orientation/consciousness                    Overall:                    
oriented to person, place and time                    2017               
     None                

 

 Full Exam - General                     Psychiatric                    
mood and affect                    Overall:                    normal mood and 
affect                    2017                    None                



                                                                              



Procedures

          No Procedures data                                                   
                 



Vital Signs

                      





 Date                    Vital                









 2018                                        Blood Pressure 1: 120/78 Code
: 8480-6                                                          BMI: 29.4 Code
: 29195-2                                                          Heart Rate 1
: 60 bpm                                                          Height: 5'7" 
                                                         SpO2: 98%             
                                             Weight: 188 lbs                   
                

 

 2018                                        Blood Pressure 1: 122/70 Code
: 8480-6                                                          BMI: 29.1 Code
: 85138-4                                                          Heart Rate 1
: 66 bpm                                                          Height: 5'7" 
                                                         SpO2: 96%             
                                             Weight: 186 lbs                   
                

 

 2017                                        Blood Pressure 1: 132/70 Code
: 8480-6                                                          BMI: 29.9 Code
: 98536-4                                                          Heart Rate 1
: 55 bpm                                                          Height: 5'7" 
                                                         SpO2: 96%             
                                             Weight: 191 lbs                   
                



                                                                               
                             



Functional Status

          No Functional Status data                                            
              



History of Present Illness

                      





 Symptom Name                    Status                    Result              
      Effective Date                    Notes                

 

 hypertension                    Onset and Resolution                    
ongoing                    2018                    None                

 

 hypertension                    Blood Pressure Values                    
patient checking blood pressure at home - did not bring in readings            
        2018                    None                

 

 hypertension                    Pertinent Findings                    Denies 
dizziness                    2018                    None                

 

 hypertension                    Pertinent Findings                    Denies 
dyspnea                    2018                    None                

 

 hypertension                    Pertinent Findings                    Denies 
edema                    2018                    None                

 

 hypertension                    Quality                    chronic            
        2018                    None                

 

 hypertension                    Quality                    primary 
hypertension                    2018                    None             
   

 

 hypertension                    Onset of Symptom                    during 
adulthood                    2018                    None                

 

 hypertension                    Alleviating Factors                    
medication                    2018                    None                

 

 hyperlipidemia                    Onset and Resolution                    
gradual in onset                    2018                    None         
       

 

 hyperlipidemia                    Onset of Symptom                    during 
adulthood                    2018                    None                

 

 hyperlipidemia                    Alleviating Factors                    
medication                    2018                    None                

 

 hyperlipidemia                    Exacerbating Factors                    diet
                    2018                    None                

 

 hyperlipidemia                    Significant Medications                    
statin                    2018                    None                

 

 hypertension                    Quality                    stable             
       2018                    None                

 

 hypertension                    Quality                    intermittent       
             2018                    None                

 

 hypertension                    Onset and Resolution                    
ongoing                    2018                    None                

 

 hypertension                    Blood Pressure Values                    
patient checking blood pressure at home - did not bring in readings            
        2018                    None                

 

 hypertension                    Pertinent Findings                    Denies 
dizziness                    2018                    None                

 

 hypertension                    Pertinent Findings                    Denies 
dyspnea                    2018                    None                

 

 hyperlipidemia                    Onset and Resolution                    
ongoing                    2018                    None                

 

 hypertension                    Pertinent Findings                    Denies 
edema                    2018                    None                

 

 hypertension                    Pertinent Findings                    Denies 
anxiety                    2018                    None                

 

 hypertension                    Pertinent Findings                    Denies 
decreased energy                    2018                    None         
       

 

 hypertension                    Triggers                    no known 
associated factors                    2018                    None       
         

 

 hypertension                    Quality                    intermittent       
             2017                    None                

 

 hypertension                    Onset and Resolution                    
ongoing                    2017                    None                

 

 hypertension                    Blood Pressure Values                    
patient checking blood pressure at home - did not bring in readings            
        2017                    None                

 

 hypertension                    Pertinent Findings                    Denies 
dizziness                    2017                    None                

 

 hypertension                    Pertinent Findings                    Denies 
dyspnea                    2017                    None                

 

 hyperlipidemia                    Onset and Resolution                    
ongoing                    2017                    None                



                                                                    



Advance Directives

          No Advance Directive data                                            
                        



Encounters

                      





 Encounter                    Performer                    Location            
        Codes                    Date                

 

                     [phone]) [id number] EST. PATIENT, LEVEL IV

Diagnosis: Essential (primary) hypertension[ICD10: I10]

Diagnosis: Mixed hyperlipidemia[ICD10: E78.2]                                  
    [facility]                    
CPT-4: 07968                    2018                

 

                     [phone]) [phone] EST. PATIENT, LEVEL IV

Diagnosis: Essential (primary) hypertension[ICD10: I10]

Diagnosis: Mixed hyperlipidemia[ICD10: E78.2]                                  
    [facility]                    
CPT-4: 97116                    2018                

 

                     ([id number]) OFFICE  VISIT, NEW - LEVEL 3

Diagnosis: Essential (primary) hypertension[ICD10: I10]

Diagnosis: Mixed hyperlipidemia[ICD10: E78.2]                                  
    [facility]                    
CPT-4: 49998                    2017                



                                                                               
                                                                     



Plan of Care

                      





 Planned Activity                    Notes                    Codes            
        Status                    Date                

 

 Patient Education: Patient Medication Summary                                 
                            Completed                    2019            
    

 

 Care Plan: Comp Metabolic                                                     
        Pending                    2019                

 

 Care Plan: Cbc With Differential                                              
               Pending                    2019                

 

 Care Plan: Tsh                                                             
Pending                    2019                

 

 Care Plan: Lipid                                                             
Pending                    2019                

 

 Care Plan: Total Psa                                                          
   Pending                    2019                

 

 Visit Plan:                     Hypertension - well controlled - continue with 
current medications, continue with no added salt diet. Pt has been encouraged 
to exercise daily. The pt has been advised to call the office if there are any 
acute concerns about change in blood pressure readings at home. Hyperlipidemia 
- pt has been counseled about appropriate diet, exercise, and need for low fat 
food choices. I have discussed the need for the patient to take medications as 
prescribed. If the patient has negative side effects from the medication, they 
are to CALL the office and not abruptly discontinue the medication without 
discussion with a practitioner in the office. We will check labs in 3-6 months 
for follow up on the patient's chronic medical problem and to assure normal 
liver response to medications.

                                                             2018        
        

 

 Appointment: Tiffanie Hodges 

WPtel:[phone]

 [address] [place], [place] [id number]

                                                             (15 min) 
Moderate                    2018                

 

 Patient Education: Patient Medication Summary                                 
                            Completed                    2018            
    

 

 Patient Education: Patient Medication Summary                                 
                            Completed                    2018            
    

 

 Patient Education: Patient Medication Summary                                 
                            Completed                    2018            
    

 

 Visit Plan:                     Hypertension - well controlled - continue with 
current medications, continue with no added salt diet. Pt has been encouraged 
to exercise daily. The pt has been advised to call the office if there are any 
acute concerns about change in blood pressure readings at home. Hyperlipidemia 
- pt has been counseled about appropriate diet, exercise, and need for low fat 
food choices. I have discussed the need for the patient to take medications as 
prescribed. If the patient has negative side effects from the medication, they 
are to CALL the office and not abruptly discontinue the medication without 
discussion with a practitioner in the office. We will check labs in 3-6 months 
for follow up on the patient's chronic medical problem and to assure normal 
liver response to medications.

                                                             2018        
        

 

 Appointment: Tiffanie Hodges 

WPtel:[phone]

 [address] [facility]

                                                             (15 min) 
Moderate                    2018                

 

 Patient Education: Patient Medication Summary                                 
                            Completed                    2018            
    

 

 Patient Education: Patient Medication Summary                                 
                            Completed                    2018            
    

 

 Visit Plan:                     Hypertension - well controlled - continue with 
current medications, continue with no added salt diet. Pt has been encouraged 
to exercise daily. The pt has been advised to call the office if there are any 
acute concerns about change in blood pressure readings at home. Hyperlipidemia 
- pt has been counseled about appropriate diet, exercise, and need for low fat 
food choices. I have discussed the need for the patient to take medications as 
prescribed. If the patient has negative side effects from the medication, they 
are to CALL the office and not abruptly discontinue the medication without 
discussion with a practitioner in the office. We will check labs in 3-6 months 
for follow up on the patient's chronic medical problem and to assure normal 
liver response to medications.

                                                             2017        
        

 

 Patient Education: Patient Medication Summary                                 
                            Completed                    2017            
    

 

 Patient Education: Obesity                                                    
         Completed                    2017                

 

 Appointment: Tiffanie Hodges 

WPtel:[phone]

 [facility] [facility]

                                                             New Patient     
               2017                



                                                                               
                                                                               
                                                                                



Instructions

                      





 Comment                

 

                     . Hypertension - well controlled - continue with current 
medications, continue with no added salt diet.  Pt has been encouraged to 
exercise daily.

The pt has been advised to call the office if there are any acute concerns 
about change in blood pressure readings at home.



Hyperlipidemia -  pt has been counseled about appropriate diet, exercise, and 
need for low fat food choices.  I have discussed the need for the patient to 
take medications as prescribed. If the patient has negative side effects from 
the medication, they are to CALL the office and not abruptly discontinue the 
medication without discussion with a practitioner in the office.  We will check 
labs in 3-6 months for follow up on the patient's chronic medical problem and 
to assure normal liver response to medications.

                                  

 

                     corcidin HBP

 . Hypertension - well controlled - continue with current medications, continue 
with no added salt diet.  Pt has been encouraged to exercise daily.

The pt has been advised to call the office if there are any acute concerns 
about change in blood pressure readings at home.



Hyperlipidemia -  pt has been counseled about appropriate diet, exercise, and 
need for low fat food choices.  I have discussed the need for the patient to 
take medications as prescribed. If the patient has negative side effects from 
the medication, they are to CALL the office and not abruptly discontinue the 
medication without discussion with a practitioner in the office.  We will check 
labs in 3-6 months for follow up on the patient's chronic medical problem and 
to assure normal liver response to medications.

                                  

 

                     . Hypertension - well controlled - continue with current 
medications, continue with no added salt diet.  Pt has been encouraged to 
exercise daily.

The pt has been advised to call the office if there are any acute concerns 
about change in blood pressure readings at home.



Hyperlipidemia -  pt has been counseled about appropriate diet, exercise, and 
need for low fat food choices.  I have discussed the need for the patient to 
take medications as prescribed. If the patient has negative side effects from 
the medication, they are to CALL the office and not abruptly discontinue the 
medication without discussion with a practitioner in the office.  We will check 
labs in 3-6 months for follow up on the patient's chronic medical problem and 
to assure normal liver response to medications.

## 2019-02-15 NOTE — OPERATIVE REPORT
DATE OF SERVICE:  



COLONOSCOPY SUMMARY



INDICATION FOR THE PROCEDURE:

Screening colonoscopy.



The patient was placed in the left lateral decubitus position.  Prior to

undergoing colonoscopy, digital rectal evaluation was performed.  Anal sphincter

tone was normal and the perianal reflexes intact.  Prostate is moderately

enlarged, anodular and nontender to digital inspection.  No other abnormalities,

no additional inspection of the anal canal or distal rectal vault.  The

colonoscope was then inserted into the rectum and under direct visualization

advanced to cecum.  The cecum was identified by identification of the ileocecal

valve and the cecal strap.  Photographic documentation was obtained.  Careful

inspection was made as the colonoscope was withdrawn.  Quality of the colonic

prep was good.



FINDINGS:

There was no evidence for internal or external hemorrhoids.  The rectum was

unremarkable.  Several small to medium size sigmoid diverticulum were present

without evidence for diverticulitis.  No other sigmoid colonic abnormalities

were appreciated.  The descending colon, splenic flexure, transverse colon,

hepatic flexure, ascending colon and cecum were unremarkable.



ASSESSMENT:

1.  No evidence for neoplasia was identified on today's procedure.  As the

patient reports no family history for colon cancer, we would advocate

consideration for repeat screening colonoscopy in 10 years.

2.  Digital rectal evaluation was compatible with moderate BPH as noted above.

3.  Mild to moderate diverticular disease confined to the sigmoid colon was

present without evidence for diverticulitis.



I thank you for the referral of this pleasant gentleman.





Job ID: [id number]

DocumentID: [id number]

Dictated Date:  02/15/2019 08:51:59

Transcription Date: 02/15/2019 13:18:38

Dictated By: NATA ALLEN MD

## 2019-02-15 NOTE — XMS REPORT
CCD document using C-CDA

 Created on: 2019



Bharat Vuong

External Reference #: [id number]

: 1950

Sex: Male



Demographics







 Address  [address] [place]  [address]

 

 Home Phone  [phone]

 

 Preferred Language  English

 

 Marital Status  

 

 Congregation Affiliation  Unknown

 

 Race  White

 

 Ethnic Group  Not  or 





Author







 Author  [facility]

 

 Organization  [facility]

 

 Address  [address] [place]  [address]



 

 Phone  [phone]







Care Team Providers







 Care Team Member Name  Role  Phone

 

  PP  Unavailable

 

  CCM  Unavailable



                                            



Summary Purpose

          Interface Exchange                                                   
                 



Insurance Providers

                      





 Payer name                    Policy type / Coverage type                    
Covered party ID                    Effective Begin Date                    
Effective End Date                

 

 [employer]                    Medicare Part B                    [id number]
                    [id number]                    Unknown                

 

 [facility]                    Medicare Part B                    
[id number]                    [id number]                    Unknown                



                                                                               
         



Family history

                      



Mother            





 Diagnosis                    Age At Onset                

 

 Skin cancer                    Unknown                



            



Father            





 Diagnosis                    Age At Onset                

 

 Cancer                    Unknown                

 

 Skin cancer                    Unknown                

 

 Hypertension                    Unknown                



                                                                               
         



Social History

                      





 Social History Element                    Codes                    Description
                    Effective Dates                

 

 Marital status                    Unknown                    

Tessa                    2017                

 

 Number of children                    Unknown                    2            
        2017                

 

 Employment                    Unknown                    Retired              
      2017                

 

 Tobacco history                    SNOMED CT: 7846870                    
Former smoker

 1978                    2017                

 

 Alcohol history                    SNOMED CT: 307256                    
Currently drinks alcohol                    2017                

 

 Frequency of drinks                    SNOMED CT: 786375580                    
1-4 drinks per week

 3 wkly                    2017                



                                                                               
                                                           



Allergies, Adverse Reactions, Alerts

                      





 Substance                    Reaction                    Codes                
    Entered Date                    Inactivated Date                    Status 
               

 

                     * NO KNOWN DRUG ALLERGIES                                 
                          Unknown                    2017                
    No Inactive Date                    Active                



                                                                              



Past Medical History

                      





 Illness                    Codes                    Condition Status          
          Onset Date                    Resolved Date                

 

                     Essential (primary) hypertension                          
            ICD-9: 401.1

ICD-10: I10                    Active                    2017            
        Unknown                

 

                     Mixed hyperlipidemia                                      
ICD-9: 272.2

ICD-10: E78.2                    Active                    2017          
          Unknown                

 

                     Encounter for screening for malignant neoplasm of prostate
                                      ICD-9: V76.44

ICD-10: Z12.5                    Active                    2019          
          Unknown                

 

                     Hyperglycemia, unspecified                                
      ICD-9: 790.29

ICD-10: R73.9                    Active                    2018          
          Unknown                



                                                                               
                                       



Problems

                      





 Condition                    Codes                    Effective Dates         
           Condition Status                

 

                     Essential (primary) hypertension                          
            ICD-9: 401.1

ICD-10: I10                    2017                    Active            
    

 

                     Mixed hyperlipidemia                                      
ICD-9: 272.2

ICD-10: E78.2                    2017                    Active          
      

 

                     Encounter for screening for malignant neoplasm of prostate
                                      ICD-9: V76.44

ICD-10: Z12.5                    2019                    Active          
      

 

                     Hyperglycemia, unspecified                                
      ICD-9: 790.29

ICD-10: R73.9                    2018                    Active          
      



                                                                               
                                       



Medications

                      





 Medication                    Codes                    Instructions           
         Start Date                    Stop Date                    Status     
               Fill Instructions                

 

                     bisoprolol 10 mg-hydrochlorothiazide 6.25 mg tablet       
                               RxNorm: 591408                    1 Tablet(s) PO 
daily                    2019              
      Active                                    

 

                     Avodart 0.5 mg capsule                                    
  RxNorm: 023148                    1 Capsule(s) PO QPM                    2020                    Active                  
                  

 

                     rosuvastatin 10 mg tablet                                 
     RxNorm: 675033                    Tablet(s) TAKE 1 TABLET DAILY           
         2019                    Active    
                                

 

                     Avodart 0.5 mg capsule                                    
  RxNorm: 608191                    1 Capsule(s) PO QPM                    2019                    Inactive                
                    

 

                     rosuvastatin 10 mg tablet                                 
     RxNorm: 549793                    TAKE 1 TABLET DAILY                    2019                    Inactive             
                       

 

                     rosuvastatin 10 mg tablet                                 
     RxNorm: 732280                    1 Tablet(s) PO daily                    
2017                    Inactive           
                         

 

                     bisoprolol 10 mg-hydrochlorothiazide 6.25 mg tablet       
                               RxNorm: 313976                    1 Tablet(s) PO 
daily                    2017              
      Inactive                                    

 

                     bisoprolol 10 mg-hydrochlorothiazide 6.25 mg tablet       
                               RxNorm: 477228                    1 Tablet(s) PO 
daily                    2017                    07/10/2017              
      Inactive                                    

 

                     bisoprolol 10 mg-hydrochlorothiazide 6.25 mg tablet       
                               RxNorm: 525531                    1 Tablet(s) PO 
daily                    2017              
      Inactive                                    

 

                     rosuvastatin 10 mg tablet                                 
     RxNorm: 252938                    1 Tablet(s) PO daily                    
2017                    Inactive           
                         

 

                     rosuvastatin 10 mg tablet                                 
     RxNorm: 608250                    1 Tablet(s) PO daily                    
No Start Date                    07/10/2017                    Inactive        
                            



                                                                               
                                                                               
                    



Medication Administered

          No Medication Administered data                                      
                              



Immunizations

          No Immunization data                                                 
         



Assessments

                      





 Condition                    Codes                    Effective Dates         
       

 

 Essential (primary) hypertension                    ICD-10: I10

ICD-9: 401.1                    2019                

 

 Mixed hyperlipidemia                    ICD-10: E78.2

ICD-9: 272.2                    2019                

 

 Encounter for screening for malignant neoplasm of prostate                    
ICD-10: Z12.5

ICD-9: V76.44                    2019                

 

 Hyperglycemia, unspecified                    ICD-10: R73.9

ICD-9: 790.29                    2018                



                                                                    



Reason For Visit

                      





 Reason For Visit                    Effective Dates                    Notes  
              

 

 hypertension                    2019                                    

 

 hypertension                    2018                                    

 

 hypertension                    2018                                    

 

 hypertension                    2017                                    



                                                                              



Results

                      





 Observation                    Observation Code                    Item       
             Item Code                    Result                    Date       
         

 

 Tsh                    Ord6                    TSH (3rd IS)                   
                     3.23 uIU/mL                    2019                

 

 Total Psa                    Ord10                    PSA                     
                   4.09 ng/mL                    2019                

 

 Cbc With Differential                    Ord2                    WBC          
                              5.08 K/ul                    2019          
      

 

 Cbc With Differential                    Ord2                    RBC          
                              4.76 M/ul                    2019          
      

 

 Cbc With Differential                    Ord2                    HGB          
                              15.8 g/dl                    2019          
      

 

 Cbc With Differential                    Ord2                    HCT          
                              44.5 %                    2019             
   

 

 Cbc With Differential                    Ord2                    Neut%        
                                53.6 %                    2019           
     

 

 Cbc With Differential                    Ord2                    Lymph%       
                                 35.0 %                    2019          
      

 

 Cbc With Differential                    Ord2                    MCV          
                              93.5 fl                    2019            
    

 

 Cbc With Differential                    Ord2                    Mono%        
                                9.4 %                    2019            
    

 

 Cbc With Differential                    Ord2                    MCH          
                              33.2 pg                    2019            
    

 

 Cbc With Differential                    Ord2                    Eos%         
                               1.6 %                    2019             
   

 

 Cbc With Differential                    Ord2                    MCHC         
                               35.5 pg                    2019           
     

 

 Cbc With Differential                    Ord2                    PLT          
                              184 K/ul                    2019           
     

 

 Cbc With Differential                    Ord2                    Baso%        
                                0.4 %                    2019            
    

 

 Cbc With Differential                    Ord2                    Neut ABS#    
                                    2.72 K/ul                    2019    
            

 

 Cbc With Differential                    Ord2                    RDW          
                              12.5 %                    2019             
   

 

 Cbc With Differential                    Ord2                    Lymph ABS#   
                                     1.78 K/ul                    2019   
             

 

 Cbc With Differential                    Ord2                    Mono ABS#    
                                    0.5 K/ul                    2019     
           

 

 Cbc With Differential                    Ord2                    Eos ABS#     
                                   0.1 K/ul                    2019      
          

 

 Cbc With Differential                    Ord2                    Baso ABS#    
                                    0.0 K/ul                    2019     
           

 

 Lipid                    Ord30                    CHOL                        
                152 mg/dL                    2019                

 

 Lipid                    Ord30                    HDL                         
               51.0 mg/dl                    2019                

 

 Lipid                    Ord30                    TRIG                        
                102 mg/dL                    2019                

 

 Lipid                    Ord30                    LDL                         
               81 mg/dL                    2019                

 

 Lipid                    Ord30                    C/HDL                       
                 3.0 Ratio                    2019                

 

 Comp Metabolic                    Svq579                    NA                
                        139 mEq/L                    2019                

 

 Comp Metabolic                    Fwt980                    K                 
                       4.7 mEq/L                    2019                

 

 Comp Metabolic                    Lfo831                    CL                
                        101 mEq/L                    2019                

 

 Comp Metabolic                    Nvh916                    CO2               
                         31.0 mEq/L                    2019              
  

 

 Comp Metabolic                    Eqa944                    ANION GAP         
                               12                     2019                

 

 Comp Metabolic                    Twv066                    GLUCOSE           
                             128 mg/dL                    2019           
     

 

 Comp Metabolic                    Lss041                    Creat             
                           1.0 mg/dL                    2019             
   

 

 Comp Metabolic                    Udj405                    eGFR              
                          81 ml/min/1.73m2                    2019       
         

 

 Comp Metabolic                    Bum553                    BUN               
                         20 mg/dL                    2019                

 

 Comp Metabolic                    Ath519                    B/C Ratio         
                               20.4 Ratio                    2019        
        

 

 Comp Metabolic                    Rck623                    CALCIUM           
                             9.7 mg/dL                    2019           
     

 

 Comp Metabolic                    Kge688                    ALK PHOS          
                              41 U/L                    2019             
   

 

 Comp Metabolic                    Lhj828                    AST(SGOT)         
                               22 U/L                    2019            
    

 

 Comp Metabolic                    Jyd933                    ALT(SGPT)         
                               39 U/L                    2019            
    

 

 Comp Metabolic                    Vya948                    BILI T            
                            1.0 mg/dL                    2019            
    

 

 Comp Metabolic                    Twz701                    ALBUMIN           
                             4.4 g/dL                    2019            
    

 

 Comp Metabolic                    Sds076                    TPRO              
                          6.9 g/dL                    2019                

 

 Comp Metabolic                    Znr459                    GLOB              
                          2.5 g/dL                    2019                

 

 Comp Metabolic                    Fos558                    A/G Ratio         
                               1.7 Ratio                    2019         
       

 

 Comp Metabolic                    Vvi939                    Osmo              
                          282 mOsmo                    2019              
  

 

 Comp Metabolic                    Hix032                    NA                
                        140 mEq/L                    2018                

 

 Comp Metabolic                    Sas121                    K                 
                       5.1 mEq/L                    2018                

 

 Comp Metabolic                    Iuc113                    CL                
                        103 mEq/L                    2018                

 

 Comp Metabolic                    Tpx433                    CO2               
                         31.0 mEq/L                    2018              
  

 

 Comp Metabolic                    Uwb368                    ANION GAP         
                               11                     2018                

 

 Comp Metabolic                    Jqs993                    GLUCOSE           
                             134 mg/dL                    2018           
     

 

 Comp Metabolic                    Ffh367                    Creat             
                           1.0 mg/dL                    2018             
   

 

 Comp Metabolic                    Fty779                    eGFR              
                          84 ml/min/1.73m2                    2018       
         

 

 Comp Metabolic                    Uta883                    BUN               
                         22 mg/dL                    2018                

 

 Comp Metabolic                    Wqw795                    B/C Ratio         
                               23.2 Ratio                    2018        
        

 

 Comp Metabolic                    Gyx691                    CALCIUM           
                             9.6 mg/dL                    2018           
     

 

 Comp Metabolic                    Dwh624                    ALK PHOS          
                              37 U/L                    2018             
   

 

 Comp Metabolic                    Mzp698                    AST(SGOT)         
                               19 U/L                    2018            
    

 

 Comp Metabolic                    Tof690                    ALT(SGPT)         
                               28 U/L                    2018            
    

 

 Comp Metabolic                    Jth908                    BILI T            
                            0.8 mg/dL                    2018            
    

 

 Comp Metabolic                    Cjc236                    ALBUMIN           
                             4.4 g/dL                    2018            
    

 

 Comp Metabolic                    Fuc571                    TPRO              
                          6.8 g/dL                    2018                

 

 Comp Metabolic                    Lcb259                    GLOB              
                          2.4 g/dL                    2018                

 

 Comp Metabolic                    Gro652                    A/G Ratio         
                               1.8 Ratio                    2018         
       

 

 Comp Metabolic                    Ens579                    Osmo              
                          285 mOsmo                    2018              
  

 

 Cbc With Differential                    Ord2                    WBC          
                              5.78 K/ul                    2018          
      

 

 Cbc With Differential                    Ord2                    RBC          
                              4.89 M/ul                    2018          
      

 

 Cbc With Differential                    Ord2                    HGB          
                              16.5 g/dl                    2018          
      

 

 Cbc With Differential                    Ord2                    HCT          
                              45.7 %                    2018             
   

 

 Cbc With Differential                    Ord2                    Neut%        
                                56.2 %                    2018           
     

 

 Cbc With Differential                    Ord2                    MCV          
                              93.5 fl                    2018            
    

 

 Cbc With Differential                    Ord2                    Lymph%       
                                 32.9 %                    2018          
      

 

 Cbc With Differential                    Ord2                    MCH          
                              33.7 pg                    2018            
    

 

 Cbc With Differential                    Ord2                    Mono%        
                                9.3 %                    2018            
    

 

 Cbc With Differential                    Ord2                    MCHC         
                               36.1 pg                    2018           
     

 

 Cbc With Differential                    Ord2                    Eos%         
                               1.4 %                    2018             
   

 

 Cbc With Differential                    Ord2                    PLT          
                              194 K/ul                    2018           
     

 

 Cbc With Differential                    Ord2                    Baso%        
                                0.2 %                    2018            
    

 

 Cbc With Differential                    Ord2                    RDW          
                              12.2 %                    2018             
   

 

 Cbc With Differential                    Ord2                    Neut ABS#    
                                    3.25 K/ul                    2018    
            

 

 Cbc With Differential                    Ord2                    Lymph ABS#   
                                     1.90 K/ul                    2018   
             

 

 Cbc With Differential                    Ord2                    Mono ABS#    
                                    0.5 K/ul                    2018     
           

 

 Cbc With Differential                    Ord2                    Eos ABS#     
                                   0.1 K/ul                    2018      
          

 

 Cbc With Differential                    Ord2                    Baso ABS#    
                                    0.0 K/ul                    2018     
           

 

 Lipid                    Ord30                    CHOL                        
                147 mg/dL                    2018                

 

 Lipid                    Ord30                    HDL                         
               50.0 mg/dl                    2018                

 

 Lipid                    Ord30                    TRIG                        
                91 mg/dL                    2018                

 

 Lipid                    Ord30                    LDL                         
               79 mg/dL                    2018                

 

 Lipid                    Ord30                    C/HDL                       
                 2.9 Ratio                    2018                

 

 %Hba1C                    Yca135                    % HbA1c                    
73650-3                    5.5 %                    2018                

 

 %Hba1C                    Uiu239                    Gluc Ave                  
                      111 mg/dL                    2018                

 

 Lipid                    Ord30                    CHOL                        
                154 mg/dL                    2018                

 

 Lipid                    Ord30                    HDL                         
               49.0 mg/dl                    2018                

 

 Lipid                    Ord30                    TRIG                        
                104 mg/dL                    2018                

 

 Lipid                    Ord30                    LDL                         
               84 mg/dL                    2018                

 

 Lipid                    Ord30                    C/HDL                       
                 3.1 Ratio                    2018                

 

 Tsh                    Ord6                    hTSH II                        
                2.76 uIU/mL                    2018                

 

 Comp Metabolic                    Cur026                    NA                
                        141 mEq/L                    2018                

 

 Comp Metabolic                    Jxt913                    K                 
                       4.7 mEq/L                    2018                

 

 Comp Metabolic                    Khu930                    CL                
                        103 mEq/L                    2018                

 

 Comp Metabolic                    Gvw384                    CO2               
                         31.0 mEq/L                    2018              
  

 

 Comp Metabolic                    Yqk839                    ANION GAP         
                               12                     2018                

 

 Comp Metabolic                    Qda174                    GLUCOSE           
                             135 mg/dL                    2018           
     

 

 Comp Metabolic                    Hwi698                    Creat             
                           0.9 mg/dL                    2018             
   

 

 Comp Metabolic                    Hsg936                    eGFR              
                          90 ml/min/1.73m2                    2018       
         

 

 Comp Metabolic                    Leh936                    BUN               
                         16 mg/dL                    2018                

 

 Comp Metabolic                    Zey489                    B/C Ratio         
                               17.8 Ratio                    2018        
        

 

 Comp Metabolic                    Uxk738                    CALCIUM           
                             9.7 mg/dL                    2018           
     

 

 Comp Metabolic                    Unz279                    ALK PHOS          
                              42 U/L                    2018             
   

 

 Comp Metabolic                    Xwt433                    AST(SGOT)         
                               24 U/L                    2018            
    

 

 Comp Metabolic                    Fmg752                    ALT(SGPT)         
                               36 U/L                    2018            
    

 

 Comp Metabolic                    Wmg933                    BILI T            
                            1.1 mg/dL                    2018            
    

 

 Comp Metabolic                    Rfb620                    ALBUMIN           
                             4.5 g/dL                    2018            
    

 

 Comp Metabolic                    Dvq326                    TPRO              
                          6.7 g/dL                    2018                

 

 Comp Metabolic                    Htl793                    GLOB              
                          2.3 g/dL                    2018                

 

 Comp Metabolic                    Ham620                    A/G Ratio         
                               2.0 Ratio                    2018         
       

 

 Comp Metabolic                    Xtf379                    Osmo              
                          284 mOsmo                    2018              
  

 

 Cbc With Differential                    Ord2                    WBC          
                              5.15 K/ul                    2018          
      

 

 Cbc With Differential                    Ord2                    RBC          
                              4.75 M/ul                    2018          
      

 

 Cbc With Differential                    Ord2                    HGB          
                              16.1 g/dl                    2018          
      

 

 Cbc With Differential                    Ord2                    HCT          
                              44.6 %                    2018             
   

 

 Cbc With Differential                    Ord2                    Neut%        
                                55.7 %                    2018           
     

 

 Cbc With Differential                    Ord2                    Lymph%       
                                 34.2 %                    2018          
      

 

 Cbc With Differential                    Ord2                    MCV          
                              93.9 fl                    2018            
    

 

 Cbc With Differential                    Ord2                    MCH          
                              33.9 pg                    2018            
    

 

 Cbc With Differential                    Ord2                    Mono%        
                                8.0 %                    2018            
    

 

 Cbc With Differential                    Ord2                    MCHC         
                               36.1 pg                    2018           
     

 

 Cbc With Differential                    Ord2                    Eos%         
                               1.7 %                    2018             
   

 

 Cbc With Differential                    Ord2                    PLT          
                              182 K/ul                    2018           
     

 

 Cbc With Differential                    Ord2                    Baso%        
                                0.4 %                    2018            
    

 

 Cbc With Differential                    Ord2                    RDW          
                              12.4 %                    2018             
   

 

 Cbc With Differential                    Ord2                    Neut ABS#    
                                    2.87 K/ul                    2018    
            

 

 Cbc With Differential                    Ord2                    Lymph ABS#   
                                     1.76 K/ul                    2018   
             

 

 Cbc With Differential                    Ord2                    Mono ABS#    
                                    0.4 K/ul                    2018     
           

 

 Cbc With Differential                    Ord2                    Eos ABS#     
                                   0.1 K/ul                    2018      
          

 

 Cbc With Differential                    Ord2                    Baso ABS#    
                                    0.0 K/ul                    2018     
           

 

 %Hba1C                    Yew878                    % HbA1c                    
82631-1                    5.3 %                    2018                

 

 %Hba1C                    Xfz186                    Gluc Ave                  
                      105 mg/dL                    2018                



                                                                               
                                                                               
                                                  



Review of Systems

                      





 System                    Result                    Effective Dates           
     

 

 Constitutional                    No recent illness                    2019                

 

 Constitutional                    No chills                    2019     
           

 

 Constitutional                    No fatigue                    2019    
            

 

 Constitutional                    No fever                    2019      
          

 

 Constitutional                    No insomnia                    2019   
             

 

 Constitutional                    No malaise                    2019    
            

 

 Eyes                    No vision change                    2019        
        

 

 Ears/Nose/Throat/Neck                    No dental pain                    2019                

 

 Ears/Nose/Throat/Neck                    No dizziness                    2019                

 

 Ears/Nose/Throat/Neck                    No dysphagia                    2019                

 

 Ears/Nose/Throat/Neck                    No headache                    2019                

 

 Ears/Nose/Throat/Neck                    No hearing loss                    2019                

 

 Ears/Nose/Throat/Neck                    No nasal allergies                    
2019                

 

 Ears/Nose/Throat/Neck                    No sore throat                    2019                

 

 Ears/Nose/Throat/Neck                    No postnasal drip                    
2019                

 

 Ears/Nose/Throat/Neck                    No sinus congestion                  
  2019                

 

 Cardiovascular                    No chest pain/pressure                    2019                

 

 Cardiovascular                    No dyspnea                    2019    
            

 

 Cardiovascular                    No edema                    2019      
          

 

 Cardiovascular                    No exercise intolerance                                    

 

 Cardiovascular                    No fatigue                    2019    
            

 

 Cardiovascular                    No near-syncope/dizziness                    
2019                

 

 Respiratory                    No chest tightness                    2019                

 

 Respiratory                    No cough                    2019         
       

 

 Respiratory                    No dyspnea                    2019       
         

 

 Respiratory                    No pedal edema                    2019   
             

 

 Gastrointestinal                    No abdominal pain                    2019                

 

 Gastrointestinal                    No constipation                    2019                

 

 Gastrointestinal                    No diarrhea                    2019 
               

 

 Gastrointestinal                    No gastroesophageal reflux                
    2019                

 

 Gastrointestinal                    No nausea                    2019   
             

 

 Gastrointestinal                    No vomiting                    2019 
               

 

 Genitourinary/Nephrology                    No dysuria                                    

 

 Genitourinary/Nephrology                    No nocturia                    2019                

 

 Genitourinary/Nephrology                    No urinary incontinence           
         2019                

 

 Musculoskeletal                    No stiffness                    2019 
               

 

 Musculoskeletal                    No swelling                    2019  
              

 

 Musculoskeletal                    No muscle weakness                    2019                

 

 Musculoskeletal                    No myalgias                    2019  
              

 

 Dermatologic                    No rash                    2019         
       

 

 Dermatologic                    No sores                    2019        
        

 

 Neurologic                    No dizziness                    2019      
          

 

 Neurologic                    No headache                    2019       
         

 

 Neurologic                    No neck pain                    2019      
          

 

 Neurologic                    No syncope                    2019        
        

 

 Psychiatric                    No anxiety                    2019       
         

 

 Psychiatric                    No depression                    2019    
            

 

 Musculoskeletal                    joint complaint                    2019                

 

 Constitutional                    No recent illness                    2018                

 

 Constitutional                    No chills                    2018     
           

 

 Constitutional                    No fatigue                    2018    
            

 

 Constitutional                    No fever                    2018      
          

 

 Constitutional                    No insomnia                    2018   
             

 

 Constitutional                    No malaise                    2018    
            

 

 Eyes                    No vision change                    2018        
        

 

 Ears/Nose/Throat/Neck                    No dental pain                    2018                

 

 Ears/Nose/Throat/Neck                    No dizziness                    2018                

 

 Ears/Nose/Throat/Neck                    No dysphagia                    2018                

 

 Ears/Nose/Throat/Neck                    No headache                    2018                

 

 Ears/Nose/Throat/Neck                    No hearing loss                    2018                

 

 Ears/Nose/Throat/Neck                    No nasal allergies                    
2018                

 

 Ears/Nose/Throat/Neck                    No sore throat                    2018                

 

 Ears/Nose/Throat/Neck                    No postnasal drip                    
2018                

 

 Ears/Nose/Throat/Neck                    No sinus congestion                  
  2018                

 

 Cardiovascular                    No chest pain/pressure                    2018                

 

 Cardiovascular                    No dyspnea                    2018    
            

 

 Cardiovascular                    No edema                    2018      
          

 

 Cardiovascular                    No exercise intolerance                                    

 

 Cardiovascular                    No fatigue                    2018    
            

 

 Cardiovascular                    No near-syncope/dizziness                    
2018                

 

 Respiratory                    No chest tightness                    2018                

 

 Respiratory                    No cough                    2018         
       

 

 Respiratory                    No dyspnea                    2018       
         

 

 Respiratory                    No pedal edema                    2018   
             

 

 Gastrointestinal                    No abdominal pain                    2018                

 

 Gastrointestinal                    No constipation                    2018                

 

 Gastrointestinal                    No diarrhea                    2018 
               

 

 Gastrointestinal                    No gastroesophageal reflux                
    2018                

 

 Gastrointestinal                    No nausea                    2018   
             

 

 Gastrointestinal                    No vomiting                    2018 
               

 

 Genitourinary/Nephrology                    No dysuria                                    

 

 Genitourinary/Nephrology                    No nocturia                    2018                

 

 Genitourinary/Nephrology                    No urinary incontinence           
         2018                

 

 Musculoskeletal                    No stiffness                    2018 
               

 

 Musculoskeletal                    No swelling                    2018  
              

 

 Musculoskeletal                    No muscle weakness                    2018                

 

 Musculoskeletal                    No myalgias                    2018  
              

 

 Dermatologic                    No rash                    2018         
       

 

 Dermatologic                    No sores                    2018        
        

 

 Neurologic                    No dizziness                    2018      
          

 

 Neurologic                    No headache                    2018       
         

 

 Neurologic                    No neck pain                    2018      
          

 

 Neurologic                    No syncope                    2018        
        

 

 Psychiatric                    No anxiety                    2018       
         

 

 Psychiatric                    No depression                    2018    
            

 

 Constitutional                    No recent illness                    2018                

 

 Constitutional                    No chills                    2018     
           

 

 Constitutional                    No fatigue                    2018    
            

 

 Constitutional                    No fever                    2018      
          

 

 Constitutional                    No insomnia                    2018   
             

 

 Constitutional                    No malaise                    2018    
            

 

 Eyes                    No vision change                    2018        
        

 

 Ears/Nose/Throat/Neck                    No dental pain                    2018                

 

 Ears/Nose/Throat/Neck                    No dizziness                    2018                

 

 Ears/Nose/Throat/Neck                    No dysphagia                    2018                

 

 Ears/Nose/Throat/Neck                    No headache                    2018                

 

 Ears/Nose/Throat/Neck                    No hearing loss                    2018                

 

 Ears/Nose/Throat/Neck                    No nasal allergies                    
2018                

 

 Ears/Nose/Throat/Neck                    No sore throat                    2018                

 

 Ears/Nose/Throat/Neck                    No postnasal drip                    
2018                

 

 Ears/Nose/Throat/Neck                    No sinus congestion                  
  2018                

 

 Cardiovascular                    No chest pain/pressure                    2018                

 

 Cardiovascular                    No dyspnea                    2018    
            

 

 Cardiovascular                    No edema                    2018      
          

 

 Cardiovascular                    No exercise intolerance                                    

 

 Cardiovascular                    No fatigue                    2018    
            

 

 Cardiovascular                    No near-syncope/dizziness                    
2018                

 

 Respiratory                    No chest tightness                    2018                

 

 Respiratory                    No cough                    2018         
       

 

 Respiratory                    No dyspnea                    2018       
         

 

 Respiratory                    No pedal edema                    2018   
             

 

 Gastrointestinal                    No abdominal pain                    2018                

 

 Gastrointestinal                    No constipation                    2018                

 

 Gastrointestinal                    No diarrhea                    2018 
               

 

 Gastrointestinal                    No gastroesophageal reflux                
    2018                

 

 Gastrointestinal                    No nausea                    2018   
             

 

 Gastrointestinal                    No vomiting                    2018 
               

 

 Genitourinary/Nephrology                    No dysuria                                    

 

 Genitourinary/Nephrology                    No nocturia                    2018                

 

 Genitourinary/Nephrology                    No urinary incontinence           
         2018                

 

 Musculoskeletal                    No stiffness                    2018 
               

 

 Musculoskeletal                    No swelling                    2018  
              

 

 Musculoskeletal                    No muscle weakness                    2018                

 

 Musculoskeletal                    No myalgias                    2018  
              

 

 Dermatologic                    No rash                    2018         
       

 

 Dermatologic                    No sores                    2018        
        

 

 Neurologic                    No dizziness                    2018      
          

 

 Neurologic                    No headache                    2018       
         

 

 Neurologic                    No neck pain                    2018      
          

 

 Neurologic                    No syncope                    2018        
        

 

 Psychiatric                    No anxiety                    2018       
         

 

 Psychiatric                    No depression                    2018    
            

 

 Constitutional                    No recent illness                    2017                

 

 Constitutional                    No chills                    2017     
           

 

 Constitutional                    No fatigue                    2017    
            

 

 Constitutional                    No fever                    2017      
          

 

 Constitutional                    No insomnia                    2017   
             

 

 Constitutional                    No malaise                    2017    
            

 

 Eyes                    No vision change                    2017        
        

 

 Ears/Nose/Throat/Neck                    No dental pain                    2017                

 

 Ears/Nose/Throat/Neck                    No dizziness                    2017                

 

 Ears/Nose/Throat/Neck                    No dysphagia                    2017                

 

 Ears/Nose/Throat/Neck                    No headache                    2017                

 

 Ears/Nose/Throat/Neck                    No hearing loss                    2017                

 

 Ears/Nose/Throat/Neck                    No nasal allergies                    
2017                

 

 Ears/Nose/Throat/Neck                    No sore throat                    2017                

 

 Ears/Nose/Throat/Neck                    No postnasal drip                    
2017                

 

 Ears/Nose/Throat/Neck                    No sinus congestion                  
  2017                

 

 Cardiovascular                    No chest pain/pressure                    2017                

 

 Cardiovascular                    No dyspnea                    2017    
            

 

 Cardiovascular                    No edema                    2017      
          

 

 Cardiovascular                    No exercise intolerance                                    

 

 Cardiovascular                    No fatigue                    2017    
            

 

 Cardiovascular                    No near-syncope/dizziness                    
2017                

 

 Respiratory                    No chest tightness                    2017                

 

 Respiratory                    No cough                    2017         
       

 

 Respiratory                    No dyspnea                    2017       
         

 

 Respiratory                    No pedal edema                    2017   
             

 

 Gastrointestinal                    No abdominal pain                    2017                

 

 Gastrointestinal                    No constipation                    2017                

 

 Gastrointestinal                    No diarrhea                    2017 
               

 

 Gastrointestinal                    No gastroesophageal reflux                
    2017                

 

 Gastrointestinal                    No nausea                    2017   
             

 

 Gastrointestinal                    No vomiting                    2017 
               

 

 Genitourinary/Nephrology                    No dysuria                                    

 

 Genitourinary/Nephrology                    No nocturia                    2017                

 

 Genitourinary/Nephrology                    No urinary incontinence           
         2017                

 

 Musculoskeletal                    No stiffness                    2017 
               

 

 Musculoskeletal                    No swelling                    2017  
              

 

 Musculoskeletal                    No muscle weakness                    2017                

 

 Musculoskeletal                    No myalgias                    2017  
              

 

 Dermatologic                    No rash                    2017         
       

 

 Dermatologic                    No sores                    2017        
        

 

 Neurologic                    No dizziness                    2017      
          

 

 Neurologic                    No headache                    2017       
         

 

 Neurologic                    No neck pain                    2017      
          

 

 Neurologic                    No syncope                    2017        
        

 

 Psychiatric                    No anxiety                    2017       
         

 

 Psychiatric                    No depression                    2017    
            



                                                                    



Physical Exam

                      





 Exam Name                    System Name                    Item Name         
           Status                    Result                    Effective Dates 
                   Notes                

 

 Full Exam - General                     Constitutional                     
general appearance                    Development:                    well 
developed                    2019                    None                

 

 Full Exam - General                     Constitutional                     
general appearance                    Development:                    appears 
stated age                    2019                    None                

 

 Full Exam - General                     Constitutional                     
general appearance                    Hygiene/Attention to Grooming:           
         good hygiene                    2019                    None    
            

 

 Full Exam - General                     Eyes                    conjunctiva
/eyelids                    Overall:                    conjunctiva clear      
              2019                    None                

 

 Full Exam - General                     Eyes                    conjunctiva
/eyelids                    Overall:                    cornea clear           
         2019                    None                

 

 Full Exam - General                     Eyes                    conjunctiva
/eyelids                    Overall:                    eyelids normal         
           2019                    None                

 

 Full Exam - General                     Eyes                    pupils and 
irises                    Overall:                    pupils equal, round, 
reactive to light and accomodation                    2019               
     None                

 

 Full Exam - General 1995                    Ears/Nose/Throat                  
  otoscopic exam                    Overall:                    external 
auditory canals clear                    2019                    None    
            

 

 Full Exam - General                     Ears/Nose/Throat                  
  otoscopic exam                    Overall:                    tympanic 
membranes clear                    2019                    None          
      

 

 Full Exam - General 1995                    Ears/Nose/Throat                  
  lips/teeth/gingiva                    Overall:                    benign lips
                    2019                    None                

 

 Full Exam - General                     Ears/Nose/Throat                  
  lips/teeth/gingiva                    Overall:                    normal 
dentition                    2019                    None                

 

 Full Exam - General 1995                    Ears/Nose/Throat                  
  oral cavity/pharynx/larynx                     Overall:                    
oral mucosa clear                    2019                    None        
        

 

 Full Exam - General 1995                    Ears/Nose/Throat                  
  oral cavity/pharynx/larynx                     Overall:                    
oropharyngeal mucosa clear                    2019                    
None                

 

 Full Exam - General                     Ears/Nose/Throat                  
  oral cavity/pharynx/larynx                     Overall:                    
hypopharynx benign                    2019                    None       
         

 

 Full Exam - General                     Ears/Nose/Throat                  
  oral cavity/pharynx/larynx                     Overall:                    no 
masses                    2019                    None                

 

 Full Exam - General                     Respiratory                    
auscultation                    Overall:                    breath sounds clear 
bilaterally                    2019                    None              
  

 

 Full Exam - General                     Respiratory                    
respiratory effort/rhythm                    Overall:                    no 
retractions                    2019                    None              
  

 

 Full Exam - General                     Respiratory                    
respiratory effort/rhythm                    Overall:                    normal 
rate                    2019                    None                

 

 Full Exam - General                     Cardiovascular                    
extremities                    Overall:                    no clubbing         
           2019                    None                

 

 Full Exam - General                     Cardiovascular                    
auscultation of heart                    Overall:                    regular 
rate                    2019                    None                

 

 Full Exam - General                     Cardiovascular                    
auscultation of heart                    Overall:                    normal 
heart sounds                    2019                    None             
   

 

 Full Exam - General                     Abdomen                    
abdominal exam                    Overall:                    no tenderness    
                2019                    None                

 

 Full Exam - General                     Abdomen                    
abdominal exam                    Overall:                    normal bowel 
sounds                    2019                    None                

 

 Full Exam - General                     Lymphatic                    neck 
nodes                    Overall:                    anterior cervical chain 
benign                    2019                    None                

 

 Full Exam - General                     Lymphatic                    neck 
nodes                    Overall:                    posterior cervical chain 
benign                    2019                    None                

 

 Full Exam - General                     Musculoskeletal                    
spine, ribs and pelvis                    Overall:                    spine 
benign                    2019                    None                

 

 Full Exam - General                     Musculoskeletal                    
spine, ribs and pelvis                    Overall:                    
sacroiliac joint benign                    2019                    None  
              

 

 Full Exam - General                     Musculoskeletal                    
spine, ribs and pelvis                    Overall:                    good 
posture                    2019                    None                

 

 Full Exam - General                     Musculoskeletal                    
head and neck                    Overall:                    head atraumatic   
                 2019                    None                

 

 Full Exam - General                     Musculoskeletal                    
head and neck                    Overall:                    cervical spine 
benign                    2019                    None                

 

 Full Exam - General                     Integument                    
inspection of skin                    Overall:                    few scattered 
moles, no gross abnormalities                    2019                    
None                

 

 Full Exam - General                     Neurologic                    deep 
tendon reflexes                    Overall:                    deep tendon 
reflexes intact                    2019                    None          
      

 

 Full Exam - General                     Neurologic                    
cranial nerves                    Overall:                    crainial nerves 2 
- 12 grossly intact                    2019                    None      
          

 

 Full Exam - General                     Psychiatric                    
orientation/consciousness                    Overall:                    
oriented to person, place and time                    2019               
     None                

 

 Full Exam - General                     Psychiatric                    
mood and affect                    Overall:                    normal mood and 
affect                    2019                    None                

 

 Full Exam - General                     Abdomen                    rectal 
exam                    Overall:                    good sphincter tone, no 
masses, no lesions                    2019                    None       
         

 

 Full Exam - General                     Abdomen                    rectal 
exam                    Prostate:                    enlarged                  
  2019                    no nodules noted                

 

 Full Exam - General                     Constitutional                     
general appearance                    Development:                    well 
developed                    2018                    None                

 

 Full Exam - General                     Constitutional                     
general appearance                    Development:                    appears 
stated age                    2018                    None                

 

 Full Exam - General                     Constitutional                     
general appearance                    Hygiene/Attention to Grooming:           
         good hygiene                    2018                    None    
            

 

 Full Exam - General                     Eyes                    conjunctiva
/eyelids                    Overall:                    conjunctiva clear      
              2018                    None                

 

 Full Exam - General                     Eyes                    conjunctiva
/eyelids                    Overall:                    cornea clear           
         2018                    None                

 

 Full Exam - General                     Eyes                    conjunctiva
/eyelids                    Overall:                    eyelids normal         
           2018                    None                

 

 Full Exam - General                     Eyes                    pupils and 
irises                    Overall:                    pupils equal, round, 
reactive to light and accomodation                    2018               
     None                

 

 Full Exam - General                     Ears/Nose/Throat                  
  otoscopic exam                    Overall:                    external 
auditory canals clear                    2018                    None    
            

 

 Full Exam - General                     Ears/Nose/Throat                  
  otoscopic exam                    Overall:                    tympanic 
membranes clear                    2018                    None          
      

 

 Full Exam - General                     Ears/Nose/Throat                  
  lips/teeth/gingiva                    Overall:                    benign lips
                    2018                    None                

 

 Full Exam - General                     Ears/Nose/Throat                  
  lips/teeth/gingiva                    Overall:                    normal 
dentition                    2018                    None                

 

 Full Exam - General                     Ears/Nose/Throat                  
  oral cavity/pharynx/larynx                     Overall:                    
oral mucosa clear                    2018                    None        
        

 

 Full Exam - General 1995                    Ears/Nose/Throat                  
  oral cavity/pharynx/larynx                     Overall:                    
oropharyngeal mucosa clear                    2018                    
None                

 

 Full Exam - General                     Ears/Nose/Throat                  
  oral cavity/pharynx/larynx                     Overall:                    
hypopharynx benign                    2018                    None       
         

 

 Full Exam - General                     Ears/Nose/Throat                  
  oral cavity/pharynx/larynx                     Overall:                    no 
masses                    2018                    None                

 

 Full Exam - General                     Respiratory                    
auscultation                    Overall:                    breath sounds clear 
bilaterally                    2018                    None              
  

 

 Full Exam - General                     Respiratory                    
respiratory effort/rhythm                    Overall:                    no 
retractions                    2018                    None              
  

 

 Full Exam - General                     Respiratory                    
respiratory effort/rhythm                    Overall:                    normal 
rate                    2018                    None                

 

 Full Exam - General                     Cardiovascular                    
extremities                    Overall:                    no clubbing         
           2018                    None                

 

 Full Exam - General                     Cardiovascular                    
auscultation of heart                    Overall:                    regular 
rate                    2018                    None                

 

 Full Exam - General                     Cardiovascular                    
auscultation of heart                    Overall:                    normal 
heart sounds                    2018                    None             
   

 

 Full Exam - General                     Abdomen                    
abdominal exam                    Overall:                    no tenderness    
                2018                    None                

 

 Full Exam - General                     Abdomen                    
abdominal exam                    Overall:                    normal bowel 
sounds                    2018                    None                

 

 Full Exam - General                     Lymphatic                    neck 
nodes                    Overall:                    anterior cervical chain 
benign                    2018                    None                

 

 Full Exam - General                     Lymphatic                    neck 
nodes                    Overall:                    posterior cervical chain 
benign                    2018                    None                

 

 Full Exam - General                     Musculoskeletal                    
spine, ribs and pelvis                    Overall:                    spine 
benign                    2018                    None                

 

 Full Exam - General                     Musculoskeletal                    
spine, ribs and pelvis                    Overall:                    
sacroiliac joint benign                    2018                    None  
              

 

 Full Exam - General                     Musculoskeletal                    
spine, ribs and pelvis                    Overall:                    good 
posture                    2018                    None                

 

 Full Exam - General                     Musculoskeletal                    
head and neck                    Overall:                    head atraumatic   
                 2018                    None                

 

 Full Exam - General                     Musculoskeletal                    
head and neck                    Overall:                    cervical spine 
benign                    2018                    None                

 

 Full Exam - General                     Integument                    
inspection of skin                    Overall:                    few scattered 
moles, no gross abnormalities                    2018                    
None                

 

 Full Exam - General                     Neurologic                    deep 
tendon reflexes                    Overall:                    deep tendon 
reflexes intact                    2018                    None          
      

 

 Full Exam - General                     Neurologic                    
cranial nerves                    Overall:                    crainial nerves 2 
- 12 grossly intact                    2018                    None      
          

 

 Full Exam - General                     Psychiatric                    
orientation/consciousness                    Overall:                    
oriented to person, place and time                    2018               
     None                

 

 Full Exam - General                     Psychiatric                    
mood and affect                    Overall:                    normal mood and 
affect                    2018                    None                

 

 Full Exam - General                     Constitutional                     
general appearance                    Development:                    well 
developed                    2018                    None                

 

 Full Exam - General                     Constitutional                     
general appearance                    Development:                    appears 
stated age                    2018                    None                

 

 Full Exam - General                     Constitutional                     
general appearance                    Hygiene/Attention to Grooming:           
         good hygiene                    2018                    None    
            

 

 Full Exam - General                     Eyes                    conjunctiva
/eyelids                    Overall:                    conjunctiva clear      
              2018                    None                

 

 Full Exam - General                     Eyes                    conjunctiva
/eyelids                    Overall:                    cornea clear           
         2018                    None                

 

 Full Exam - General                     Eyes                    conjunctiva
/eyelids                    Overall:                    eyelids normal         
           2018                    None                

 

 Full Exam - General                     Eyes                    pupils and 
irises                    Overall:                    pupils equal, round, 
reactive to light and accomodation                    2018               
     None                

 

 Full Exam - General                     Ears/Nose/Throat                  
  otoscopic exam                    Overall:                    external 
auditory canals clear                    2018                    None    
            

 

 Full Exam - General                     Ears/Nose/Throat                  
  otoscopic exam                    Overall:                    tympanic 
membranes clear                    2018                    None          
      

 

 Full Exam - General                     Ears/Nose/Throat                  
  lips/teeth/gingiva                    Overall:                    benign lips
                    2018                    None                

 

 Full Exam - General                     Ears/Nose/Throat                  
  lips/teeth/gingiva                    Overall:                    normal 
dentition                    2018                    None                

 

 Full Exam - General                     Ears/Nose/Throat                  
  oral cavity/pharynx/larynx                     Overall:                    
oral mucosa clear                    2018                    None        
        

 

 Full Exam - General                     Ears/Nose/Throat                  
  oral cavity/pharynx/larynx                     Overall:                    
oropharyngeal mucosa clear                    2018                    
None                

 

 Full Exam - General                     Ears/Nose/Throat                  
  oral cavity/pharynx/larynx                     Overall:                    
hypopharynx benign                    2018                    None       
         

 

 Full Exam - General                     Ears/Nose/Throat                  
  oral cavity/pharynx/larynx                     Overall:                    no 
masses                    2018                    None                

 

 Full Exam - General                     Respiratory                    
auscultation                    Overall:                    breath sounds clear 
bilaterally                    2018                    None              
  

 

 Full Exam - General                     Respiratory                    
respiratory effort/rhythm                    Overall:                    no 
retractions                    2018                    None              
  

 

 Full Exam - General                     Respiratory                    
respiratory effort/rhythm                    Overall:                    normal 
rate                    2018                    None                

 

 Full Exam - General                     Cardiovascular                    
extremities                    Overall:                    no clubbing         
           2018                    None                

 

 Full Exam - General                     Cardiovascular                    
auscultation of heart                    Overall:                    regular 
rate                    2018                    None                

 

 Full Exam - General                     Cardiovascular                    
auscultation of heart                    Overall:                    normal 
heart sounds                    2018                    None             
   

 

 Full Exam - General                     Abdomen                    
abdominal exam                    Overall:                    no tenderness    
                2018                    None                

 

 Full Exam - General                     Abdomen                    
abdominal exam                    Overall:                    normal bowel 
sounds                    2018                    None                

 

 Full Exam - General                     Lymphatic                    neck 
nodes                    Overall:                    anterior cervical chain 
benign                    2018                    None                

 

 Full Exam - General                     Lymphatic                    neck 
nodes                    Overall:                    posterior cervical chain 
benign                    2018                    None                

 

 Full Exam - General                     Musculoskeletal                    
spine, ribs and pelvis                    Overall:                    spine 
benign                    2018                    None                

 

 Full Exam - General                     Musculoskeletal                    
spine, ribs and pelvis                    Overall:                    
sacroiliac joint benign                    2018                    None  
              

 

 Full Exam - General                     Musculoskeletal                    
spine, ribs and pelvis                    Overall:                    good 
posture                    2018                    None                

 

 Full Exam - General                     Musculoskeletal                    
head and neck                    Overall:                    head atraumatic   
                 2018                    None                

 

 Full Exam - General                     Musculoskeletal                    
head and neck                    Overall:                    cervical spine 
benign                    2018                    None                

 

 Full Exam - General                     Integument                    
inspection of skin                    Overall:                    few scattered 
moles, no gross abnormalities                    2018                    
None                

 

 Full Exam - General                     Neurologic                    deep 
tendon reflexes                    Overall:                    deep tendon 
reflexes intact                    2018                    None          
      

 

 Full Exam - General                     Neurologic                    
cranial nerves                    Overall:                    crainial nerves 2 
- 12 grossly intact                    2018                    None      
          

 

 Full Exam - General                     Psychiatric                    
orientation/consciousness                    Overall:                    
oriented to person, place and time                    2018               
     None                

 

 Full Exam - General                     Psychiatric                    
mood and affect                    Overall:                    normal mood and 
affect                    2018                    None                

 

 Full Exam - General                     Constitutional                     
general appearance                    Development:                    well 
developed                    2017                    None                

 

 Full Exam - General                     Constitutional                     
general appearance                    Development:                    appears 
stated age                    2017                    None                

 

 Full Exam - General                     Constitutional                     
general appearance                    Hygiene/Attention to Grooming:           
         good hygiene                    2017                    None    
            

 

 Full Exam - General                     Eyes                    conjunctiva
/eyelids                    Overall:                    conjunctiva clear      
              2017                    None                

 

 Full Exam - General                     Eyes                    conjunctiva
/eyelids                    Overall:                    cornea clear           
         2017                    None                

 

 Full Exam - General                     Eyes                    conjunctiva
/eyelids                    Overall:                    eyelids normal         
           2017                    None                

 

 Full Exam - General                     Eyes                    pupils and 
irises                    Overall:                    pupils equal, round, 
reactive to light and accomodation                    2017               
     None                

 

 Full Exam - General                     Ears/Nose/Throat                  
  otoscopic exam                    Overall:                    external 
auditory canals clear                    2017                    None    
            

 

 Full Exam - General                     Ears/Nose/Throat                  
  otoscopic exam                    Overall:                    tympanic 
membranes clear                    2017                    None          
      

 

 Full Exam - General                     Ears/Nose/Throat                  
  lips/teeth/gingiva                    Overall:                    benign lips
                    2017                    None                

 

 Full Exam - General                     Ears/Nose/Throat                  
  lips/teeth/gingiva                    Overall:                    normal 
dentition                    2017                    None                

 

 Full Exam - General                     Ears/Nose/Throat                  
  oral cavity/pharynx/larynx                     Overall:                    
oral mucosa clear                    2017                    None        
        

 

 Full Exam - General                     Ears/Nose/Throat                  
  oral cavity/pharynx/larynx                     Overall:                    
oropharyngeal mucosa clear                    2017                    
None                

 

 Full Exam - General                     Ears/Nose/Throat                  
  oral cavity/pharynx/larynx                     Overall:                    
hypopharynx benign                    2017                    None       
         

 

 Full Exam - General                     Ears/Nose/Throat                  
  oral cavity/pharynx/larynx                     Overall:                    no 
masses                    2017                    None                

 

 Full Exam - General                     Respiratory                    
auscultation                    Overall:                    breath sounds clear 
bilaterally                    2017                    None              
  

 

 Full Exam - General                     Respiratory                    
respiratory effort/rhythm                    Overall:                    no 
retractions                    2017                    None              
  

 

 Full Exam - General                     Respiratory                    
respiratory effort/rhythm                    Overall:                    normal 
rate                    2017                    None                

 

 Full Exam - General                     Cardiovascular                    
extremities                    Overall:                    no clubbing         
           2017                    None                

 

 Full Exam - General                     Cardiovascular                    
auscultation of heart                    Overall:                    regular 
rate                    2017                    None                

 

 Full Exam - General                     Cardiovascular                    
auscultation of heart                    Overall:                    normal 
heart sounds                    2017                    None             
   

 

 Full Exam - General                     Abdomen                    
abdominal exam                    Overall:                    no tenderness    
                2017                    None                

 

 Full Exam - General                     Abdomen                    
abdominal exam                    Overall:                    normal bowel 
sounds                    2017                    None                

 

 Full Exam - General                     Lymphatic                    neck 
nodes                    Overall:                    anterior cervical chain 
benign                    2017                    None                

 

 Full Exam - General                     Lymphatic                    neck 
nodes                    Overall:                    posterior cervical chain 
benign                    2017                    None                

 

 Full Exam - General                     Musculoskeletal                    
spine, ribs and pelvis                    Overall:                    spine 
benign                    2017                    None                

 

 Full Exam - General                     Musculoskeletal                    
spine, ribs and pelvis                    Overall:                    
sacroiliac joint benign                    2017                    None  
              

 

 Full Exam - General                     Musculoskeletal                    
spine, ribs and pelvis                    Overall:                    good 
posture                    2017                    None                

 

 Full Exam - General                     Musculoskeletal                    
head and neck                    Overall:                    head atraumatic   
                 2017                    None                

 

 Full Exam - General                     Musculoskeletal                    
head and neck                    Overall:                    cervical spine 
benign                    2017                    None                

 

 Full Exam - General                     Integument                    
inspection of skin                    Overall:                    few scattered 
moles, no gross abnormalities                    2017                    
None                

 

 Full Exam - General                     Neurologic                    deep 
tendon reflexes                    Overall:                    deep tendon 
reflexes intact                    2017                    None          
      

 

 Full Exam - General                     Neurologic                    
cranial nerves                    Overall:                    crainial nerves 2 
- 12 grossly intact                    2017                    None      
          

 

 Full Exam - General                     Psychiatric                    
orientation/consciousness                    Overall:                    
oriented to person, place and time                    2017               
     None                

 

 Full Exam - General                     Psychiatric                    
mood and affect                    Overall:                    normal mood and 
affect                    2017                    None                



                                                                              



Procedures

          No Procedures data                                                   
                 



Vital Signs

                      





 Date                    Vital                









 2019                                        Blood Pressure 1: 118/70 Code
: 8480-6                                                          BMI: 30.2 Code
: 98090-9                                                          Heart Rate 1
: 64 bpm                                                          Height: 5'7" 
                                                         SpO2: 98%             
                                             Weight: 193 lbs                   
                

 

 2018                                        Blood Pressure 1: 120/78 Code
: 8480-6                                                          BMI: 29.4 Code
: 31183-5                                                          Heart Rate 1
: 60 bpm                                                          Height: 5'7" 
                                                         SpO2: 98%             
                                             Weight: 188 lbs                   
                

 

 2018                                        Blood Pressure 1: 122/70 Code
: 8480-6                                                          BMI: 29.1 Code
: 87231-9                                                          Heart Rate 1
: 66 bpm                                                          Height: 5'7" 
                                                         SpO2: 96%             
                                             Weight: 186 lbs                   
                

 

 2017                                        Blood Pressure 1: 132/70 Code
: 8480-6                                                          BMI: 29.9 Code
: 96237-2                                                          Heart Rate 1
: 55 bpm                                                          Height: 5'7" 
                                                         SpO2: 96%             
                                             Weight: 191 lbs                   
                



                                                                               
                                       



Functional Status

          No Functional Status data                                            
              



History of Present Illness

                      





 Symptom Name                    Status                    Result              
      Effective Date                    Notes                

 

                     Quality                    chronic                                        None                

 

                     Quality                    primary hypertension           
         2019                    None                

 

                     Quality                    stable                    2019                    None                

 

                     Onset and Resolution                    ongoing           
         2019                    None                

 

                     Onset of Symptom                    during adulthood      
              2019                    None                

 

                     Blood Pressure Values                    patient checking 
blood pressure at home - did not bring in readings                    2019                    -Checks occasionally                 

 

                     Alleviating Factors                    medication         
           2019                    None                

 

                     Pertinent Findings                    Denies dizziness    
                2019                    None                

 

                     Pertinent Findings                    Denies dyspnea      
              2019                    None                

 

                     Pertinent Findings                    Denies edema        
            2019                    None                

 

                     Onset and Resolution                    gradual in onset  
                  2019                    None                

 

                     Onset of Symptom                    during adulthood      
              2019                    None                

 

                     Significant Medications                    statin         
           2019                    None                

 

                     Alleviating Factors                    medication         
           2019                    None                

 

                     Exacerbating Factors                    diet              
      2019                    None                

 

                     Quality                    stable                    2019                    None                

 

 hypertension                    Onset and Resolution                    
ongoing                    2018                    None                

 

 hypertension                    Blood Pressure Values                    
patient checking blood pressure at home - did not bring in readings            
        2018                    None                

 

 hypertension                    Pertinent Findings                    Denies 
dizziness                    2018                    None                

 

 hypertension                    Pertinent Findings                    Denies 
dyspnea                    2018                    None                

 

 hypertension                    Pertinent Findings                    Denies 
edema                    2018                    None                

 

 hypertension                    Quality                    chronic            
        2018                    None                

 

 hypertension                    Quality                    primary 
hypertension                    2018                    None             
   

 

 hypertension                    Onset of Symptom                    during 
adulthood                    2018                    None                

 

 hypertension                    Alleviating Factors                    
medication                    2018                    None                

 

 hyperlipidemia                    Onset and Resolution                    
gradual in onset                    2018                    None         
       

 

 hyperlipidemia                    Onset of Symptom                    during 
adulthood                    2018                    None                

 

 hyperlipidemia                    Alleviating Factors                    
medication                    2018                    None                

 

 hyperlipidemia                    Exacerbating Factors                    diet
                    2018                    None                

 

 hyperlipidemia                    Significant Medications                    
statin                    2018                    None                

 

 hypertension                    Quality                    stable             
       2018                    None                

 

 hypertension                    Quality                    intermittent       
             2018                    None                

 

 hypertension                    Onset and Resolution                    
ongoing                    2018                    None                

 

 hypertension                    Blood Pressure Values                    
patient checking blood pressure at home - did not bring in readings            
        2018                    None                

 

 hypertension                    Pertinent Findings                    Denies 
dizziness                    2018                    None                

 

 hypertension                    Pertinent Findings                    Denies 
dyspnea                    2018                    None                

 

 hyperlipidemia                    Onset and Resolution                    
ongoing                    2018                    None                

 

 hypertension                    Pertinent Findings                    Denies 
edema                    2018                    None                

 

 hypertension                    Pertinent Findings                    Denies 
anxiety                    2018                    None                

 

 hypertension                    Pertinent Findings                    Denies 
decreased energy                    2018                    None         
       

 

 hypertension                    Triggers                    no known 
associated factors                    2018                    None       
         

 

 hypertension                    Quality                    intermittent       
             2017                    None                

 

 hypertension                    Onset and Resolution                    
ongoing                    2017                    None                

 

 hypertension                    Blood Pressure Values                    
patient checking blood pressure at home - did not bring in readings            
        2017                    None                

 

 hypertension                    Pertinent Findings                    Denies 
dizziness                    2017                    None                

 

 hypertension                    Pertinent Findings                    Denies 
dyspnea                    2017                    None                

 

 hyperlipidemia                    Onset and Resolution                    
ongoing                    2017                    None                



                                                                    



Advance Directives

          No Advance Directive data                                            
                        



Encounters

                      





 Encounter                    Performer                    Location            
        Codes                    Date                

 

                     [phone] EST. PATIENT, LEVEL IV

Diagnosis: Essential (primary) hypertension[ICD10: I10]

Diagnosis: Mixed hyperlipidemia[ICD10: E78.2]                                  
    [facility]                    
CPT-4: 00862                    2019                

 

                     [phone] EST. PATIENT, LEVEL IV

Diagnosis: Essential (primary) hypertension[ICD10: I10]

Diagnosis: Mixed hyperlipidemia[ICD10: E78.2]                                  
    [facility]                    
CPT-4: 81323                    2018                

 

                     [phone] EST. PATIENT, LEVEL IV

Diagnosis: Essential (primary) hypertension[ICD10: I10]

Diagnosis: Mixed hyperlipidemia[ICD10: E78.2]                                  
    [facility]                    
CPT-4: 45286                    2018                

 

                     ([id number]) OFFICE  VISIT, NEW - LEVEL 3

Diagnosis: Essential (primary) hypertension[ICD10: I10]

Diagnosis: Mixed hyperlipidemia[ICD10: E78.2]                                  
    [facility]                    
CPT-4: [id number]                    2017                



                                                                               
                                                                               



Plan of Care

                      





 Planned Activity                    Notes                    Codes            
        Status                    Date                

 

 Visit Plan:                     Hypertension - well controlled - continue with 
current medications, continue with no added salt diet. Pt has been encouraged 
to exercise daily. The pt has been advised to call the office if there are any 
acute concerns about change in blood pressure readings at home. Hyperlipidemia 
- pt has been counseled about appropriate diet, exercise, and need for low fat 
food choices. I have discussed the need for the patient to take medications as 
prescribed. If the patient has negative side effects from the medication, they 
are to CALL the office and not abruptly discontinue the medication without 
discussion with a practitioner in the office. We will check labs in 3-6 months 
for follow up on the patient's chronic medical problem and to assure normal 
liver response to medications. referral to [facility] for colonoscope

                                                             2019        
        

 

 Patient Education: Patient Medication Summary                                 
                            Completed                    2019            
    

 

 Patient Education: Cholesterol Management                                     
                        Completed                    2019                

 

 Patient Education: Patient Medication Summary                                 
                            Completed                    2019            
    

 

 Visit Plan:                     Hypertension - well controlled - continue with 
current medications, continue with no added salt diet. Pt has been encouraged 
to exercise daily. The pt has been advised to call the office if there are any 
acute concerns about change in blood pressure readings at home. Hyperlipidemia 
- pt has been counseled about appropriate diet, exercise, and need for low fat 
food choices. I have discussed the need for the patient to take medications as 
prescribed. If the patient has negative side effects from the medication, they 
are to CALL the office and not abruptly discontinue the medication without 
discussion with a practitioner in the office. We will check labs in 3-6 months 
for follow up on the patient's chronic medical problem and to assure normal 
liver response to medications.

                                                             2018        
        

 

 Appointment: Tiffanie Hodges 

WPtel:[phone]

 [address] [facility] [place]

                                                             (15 min) 
Moderate                    2018                

 

 Patient Education: Patient Medication Summary                                 
                            Completed                    2018            
    

 

 Patient Education: Patient Medication Summary                                 
                            Completed                    2018            
    

 

 Patient Education: Patient Medication Summary                                 
                            Completed                    2018            
    

 

 Visit Plan:                     Hypertension - well controlled - continue with 
current medications, continue with no added salt diet. Pt has been encouraged 
to exercise daily. The pt has been advised to call the office if there are any 
acute concerns about change in blood pressure readings at home. Hyperlipidemia 
- pt has been counseled about appropriate diet, exercise, and need for low fat 
food choices. I have discussed the need for the patient to take medications as 
prescribed. If the patient has negative side effects from the medication, they 
are to CALL the office and not abruptly discontinue the medication without 
discussion with a practitioner in the office. We will check labs in 3-6 months 
for follow up on the patient's chronic medical problem and to assure normal 
liver response to medications.

                                                             2018        
        

 

 Appointment: Tiffanie Hodges 

WPtel:[phone]

 [address] [facility] [place]

                                                             (15 min) 
Moderate                    2018                

 

 Patient Education: Patient Medication Summary                                 
                            Completed                    2018            
    

 

 Patient Education: Patient Medication Summary                                 
                            Completed                    2018            
    

 

 Visit Plan:                     Hypertension - well controlled - continue with 
current medications, continue with no added salt diet. Pt has been encouraged 
to exercise daily. The pt has been advised to call the office if there are any 
acute concerns about change in blood pressure readings at home. Hyperlipidemia 
- pt has been counseled about appropriate diet, exercise, and need for low fat 
food choices. I have discussed the need for the patient to take medications as 
prescribed. If the patient has negative side effects from the medication, they 
are to CALL the office and not abruptly discontinue the medication without 
discussion with a practitioner in the office. We will check labs in 3-6 months 
for follow up on the patient's chronic medical problem and to assure normal 
liver response to medications.

                                                             2017        
        

 

 Patient Education: Patient Medication Summary                                 
                            Completed                    2017            
    

 

 Patient Education: Obesity                                                    
         Completed                    2017                

 

 Appointment: Tiffanie Hodges 

WPtel:[phone]

 [facility] [facility] [place]

                                                             New Patient     
               2017                



                                                                               
                                                                               
                                                            



Instructions

                      





 Comment                

 

                     . Hypertension - well controlled - continue with current 
medications, continue with no added salt diet.  Pt has been encouraged to 
exercise daily.

The pt has been advised to call the office if there are any acute concerns 
about change in blood pressure readings at home.



Hyperlipidemia -  pt has been counseled about appropriate diet, exercise, and 
need for low fat food choices.  I have discussed the need for the patient to 
take medications as prescribed. If the patient has negative side effects from 
the medication, they are to CALL the office and not abruptly discontinue the 
medication without discussion with a practitioner in the office.  We will check 
labs in 3-6 months for follow up on the patient's chronic medical problem and 
to assure normal liver response to medications.

                                  

 

                     corcidin HBP

 . Hypertension - well controlled - continue with current medications, continue 
with no added salt diet.  Pt has been encouraged to exercise daily.

The pt has been advised to call the office if there are any acute concerns 
about change in blood pressure readings at home.



Hyperlipidemia -  pt has been counseled about appropriate diet, exercise, and 
need for low fat food choices.  I have discussed the need for the patient to 
take medications as prescribed. If the patient has negative side effects from 
the medication, they are to CALL the office and not abruptly discontinue the 
medication without discussion with a practitioner in the office.  We will check 
labs in 3-6 months for follow up on the patient's chronic medical problem and 
to assure normal liver response to medications.

                                  

 

                     . Hypertension - well controlled - continue with current 
medications, continue with no added salt diet.  Pt has been encouraged to 
exercise daily.

The pt has been advised to call the office if there are any acute concerns 
about change in blood pressure readings at home.



Hyperlipidemia -  pt has been counseled about appropriate diet, exercise, and 
need for low fat food choices.  I have discussed the need for the patient to 
take medications as prescribed. If the patient has negative side effects from 
the medication, they are to CALL the office and not abruptly discontinue the 
medication without discussion with a practitioner in the office.  We will check 
labs in 3-6 months for follow up on the patient's chronic medical problem and 
to assure normal liver response to medications.

                                  

 

                     . Hypertension - well controlled - continue with current 
medications, continue with no added salt diet.  Pt has been encouraged to 
exercise daily.

The pt has been advised to call the office if there are any acute concerns 
about change in blood pressure readings at home.



Hyperlipidemia -  pt has been counseled about appropriate diet, exercise, and 
need for low fat food choices.  I have discussed the need for the patient to 
take medications as prescribed. If the patient has negative side effects from 
the medication, they are to CALL the office and not abruptly discontinue the 
medication without discussion with a practitioner in the office.  We will check 
labs in 3-6 months for follow up on the patient's chronic medical problem and 
to assure normal liver response to medications.



referral to [facility] for colonoscope

## 2019-02-15 NOTE — PRE-OP NOTE & CONSCIOUS SEDAT
Pre-Operative Progress Note


H&P Reviewed


The H&P was reviewed, patient examined and no changes noted.


Date H&P Reviewed:  Feb 15, 2019


Time H&P Reviewed:  07:45





Conscious Sedation Pre-Proced


ASA Score


2


For ASA 3 and 4: Consider anesthesia and medical clearance. Also, for 

patients with a history of failed moderate sedation consider anesthesia.

















Airway 


 


Lungs 


 


Heart 


 


 ASA score


 


 ASA 1: a normal healthy patient


 


 ASA 2:  a patient with a mild systemic disease (mid diabetes, controlled 

hypertension, obesity 


 


 ASA 3:  a patient with a severe systemic disease that limits activity  (angina

, COPD, prior Myocardial infarction)


 


 ASA 4:  a patient with an incapacitating disease that is a constant threat to 

life (CHF, renal failure)


 


 ASA 5:  a moribund patient not expected to survive 24 hrs.  (ruptured aneurysm)


 


 ASA 6:  a declared brain-dead patient whose organs are being harvested.


 


 For emergent operations, add the letter E after the classification











Mallampati Classification


Grade 2





Sedation Plan


Analgesia, Amnesia, Plan communicated to team members, Discussed options with 

patient/fam, Discussed risks with patient/fam


The patient is an appropriate candidate to undergo the planned procedure, 

sedation, and anesthesia.





The patient immediately re-assessed prior to indication.











NATA ALLEN MD Feb 15, 2019 08:07

## 2019-12-12 ENCOUNTER — APPOINTMENT (RX ONLY)
Dept: URBAN - METROPOLITAN AREA CLINIC 51 | Facility: CLINIC | Age: 69
Setting detail: DERMATOLOGY
End: 2019-12-12

## 2019-12-12 DIAGNOSIS — L57.0 ACTINIC KERATOSIS: ICD-10-CM

## 2019-12-12 DIAGNOSIS — L91.8 OTHER HYPERTROPHIC DISORDERS OF THE SKIN: ICD-10-CM

## 2019-12-12 DIAGNOSIS — Z85.828 PERSONAL HISTORY OF OTHER MALIGNANT NEOPLASM OF SKIN: ICD-10-CM

## 2019-12-12 PROCEDURE — ? COUNSELING

## 2019-12-12 PROCEDURE — 99213 OFFICE O/P EST LOW 20 MIN: CPT

## 2019-12-12 PROCEDURE — ? TREATMENT REGIMEN

## 2019-12-12 ASSESSMENT — LOCATION SIMPLE DESCRIPTION DERM
LOCATION SIMPLE: RIGHT AXILLARY VAULT
LOCATION SIMPLE: LEFT CHEEK
LOCATION SIMPLE: RIGHT EAR
LOCATION SIMPLE: RIGHT FOREHEAD
LOCATION SIMPLE: LEFT AXILLARY VAULT

## 2019-12-12 ASSESSMENT — LOCATION DETAILED DESCRIPTION DERM
LOCATION DETAILED: LEFT AXILLARY VAULT
LOCATION DETAILED: RIGHT INFERIOR FOREHEAD
LOCATION DETAILED: RIGHT INFERIOR HELIX
LOCATION DETAILED: LEFT CENTRAL MALAR CHEEK
LOCATION DETAILED: RIGHT AXILLARY VAULT

## 2019-12-12 ASSESSMENT — LOCATION ZONE DERM
LOCATION ZONE: FACE
LOCATION ZONE: AXILLAE
LOCATION ZONE: EAR

## 2019-12-12 NOTE — PROCEDURE: TREATMENT REGIMEN
Plan: Patient states he has enough efudex cream at home
Initiate Treatment: Efudex 5% cream qhs x 2 weeks, wash off qam
Detail Level: Detailed

## 2020-12-17 ENCOUNTER — APPOINTMENT (RX ONLY)
Dept: URBAN - METROPOLITAN AREA CLINIC 51 | Facility: CLINIC | Age: 70
Setting detail: DERMATOLOGY
End: 2020-12-17

## 2020-12-17 DIAGNOSIS — L82.0 INFLAMED SEBORRHEIC KERATOSIS: ICD-10-CM

## 2020-12-17 DIAGNOSIS — D22 MELANOCYTIC NEVI: ICD-10-CM

## 2020-12-17 DIAGNOSIS — L57.0 ACTINIC KERATOSIS: ICD-10-CM

## 2020-12-17 DIAGNOSIS — Z85.828 PERSONAL HISTORY OF OTHER MALIGNANT NEOPLASM OF SKIN: ICD-10-CM

## 2020-12-17 PROBLEM — D22.5 MELANOCYTIC NEVI OF TRUNK: Status: ACTIVE | Noted: 2020-12-17

## 2020-12-17 PROBLEM — C44.329 SQUAMOUS CELL CARCINOMA OF SKIN OF OTHER PARTS OF FACE: Status: ACTIVE | Noted: 2020-12-17

## 2020-12-17 PROCEDURE — 17003 DESTRUCT PREMALG LES 2-14: CPT

## 2020-12-17 PROCEDURE — ? SHAVE REMOVAL AND DESTRUCTION

## 2020-12-17 PROCEDURE — ? COUNSELING

## 2020-12-17 PROCEDURE — 17000 DESTRUCT PREMALG LESION: CPT | Mod: 59

## 2020-12-17 PROCEDURE — 17280 DSTR MAL LS F/E/E/N/L/M .5/<: CPT

## 2020-12-17 PROCEDURE — 99213 OFFICE O/P EST LOW 20 MIN: CPT | Mod: 25

## 2020-12-17 PROCEDURE — ? LIQUID NITROGEN

## 2020-12-17 ASSESSMENT — LOCATION DETAILED DESCRIPTION DERM
LOCATION DETAILED: LEFT SUPERIOR UPPER BACK
LOCATION DETAILED: LEFT RADIAL DORSAL HAND
LOCATION DETAILED: LEFT FOREHEAD
LOCATION DETAILED: LEFT VENTRAL PROXIMAL FOREARM
LOCATION DETAILED: RIGHT PROXIMAL RADIAL DORSAL FOREARM
LOCATION DETAILED: RIGHT RIB CAGE
LOCATION DETAILED: RIGHT INFERIOR HELIX
LOCATION DETAILED: LEFT CENTRAL MALAR CHEEK
LOCATION DETAILED: LEFT CENTRAL BUCCAL CHEEK
LOCATION DETAILED: RIGHT PROXIMAL DORSAL FOREARM

## 2020-12-17 ASSESSMENT — LOCATION ZONE DERM
LOCATION ZONE: HAND
LOCATION ZONE: ARM
LOCATION ZONE: EAR
LOCATION ZONE: FACE
LOCATION ZONE: TRUNK

## 2020-12-17 ASSESSMENT — LOCATION SIMPLE DESCRIPTION DERM
LOCATION SIMPLE: RIGHT FOREARM
LOCATION SIMPLE: LEFT UPPER BACK
LOCATION SIMPLE: LEFT FOREHEAD
LOCATION SIMPLE: RIGHT EAR
LOCATION SIMPLE: LEFT HAND
LOCATION SIMPLE: LEFT FOREARM
LOCATION SIMPLE: ABDOMEN
LOCATION SIMPLE: LEFT CHEEK

## 2020-12-17 ASSESSMENT — PAIN INTENSITY VAS: HOW INTENSE IS YOUR PAIN 0 BEING NO PAIN, 10 BEING THE MOST SEVERE PAIN POSSIBLE?: NO PAIN

## 2020-12-17 NOTE — HPI: NON-MELANOMA SKIN CANCER F/U (HISTORY OF NMSC)
What Is The Reason For Today's Visit?: Surveillance against skin cancer recurrences
How Many Skin Cancers Have You Had?: more than one
Additional History: 6 month skin check
When Was Your Last Cancer Diagnosed?: Basal cell carcinoma of skin: left cheek- [facility]- 06/29/2010

## 2020-12-17 NOTE — PROCEDURE: LIQUID NITROGEN
Consent: The patient's verbal consent was obtained including but not limited to risks of crusting, scabbing, blistering, scarring, darker or lighter pigmentary change, recurrence, incomplete removal and infection.
Duration Of Freeze Thaw-Cycle (Seconds): 5
Number Of Freeze-Thaw Cycles: 2 freeze-thaw cycles
Post-Care Instructions: I reviewed with the patient in detail post-care instructions. Patient is to wear sunprotection, and avoid picking at any of the treated lesions. Pt may apply Vaseline to crusted or scabbing areas.
Render Note In Bullet Format When Appropriate: No
Detail Level: Zone
Render Post-Care Instructions In Note?: yes

## 2022-01-17 ENCOUNTER — APPOINTMENT (RX ONLY)
Dept: URBAN - METROPOLITAN AREA CLINIC 51 | Facility: CLINIC | Age: 72
Setting detail: DERMATOLOGY
End: 2022-01-17

## 2022-01-17 DIAGNOSIS — Z85.828 PERSONAL HISTORY OF OTHER MALIGNANT NEOPLASM OF SKIN: ICD-10-CM | Status: RESOLVED

## 2022-01-17 DIAGNOSIS — L81.4 OTHER MELANIN HYPERPIGMENTATION: ICD-10-CM | Status: STABLE

## 2022-01-17 DIAGNOSIS — D22 MELANOCYTIC NEVI: ICD-10-CM | Status: STABLE

## 2022-01-17 DIAGNOSIS — L57.0 ACTINIC KERATOSIS: ICD-10-CM | Status: WORSENING

## 2022-01-17 DIAGNOSIS — D18.0 HEMANGIOMA: ICD-10-CM | Status: STABLE

## 2022-01-17 PROBLEM — D22.5 MELANOCYTIC NEVI OF TRUNK: Status: ACTIVE | Noted: 2022-01-17

## 2022-01-17 PROBLEM — D18.01 HEMANGIOMA OF SKIN AND SUBCUTANEOUS TISSUE: Status: ACTIVE | Noted: 2022-01-17

## 2022-01-17 PROCEDURE — ? PRESCRIPTION

## 2022-01-17 PROCEDURE — 99213 OFFICE O/P EST LOW 20 MIN: CPT

## 2022-01-17 PROCEDURE — ? COUNSELING

## 2022-01-17 PROCEDURE — ? TREATMENT REGIMEN

## 2022-01-17 RX ORDER — FLUOROURACIL 5 MG/G
CREAM TOPICAL QHS
Qty: 40 | Refills: 1 | Status: ERX

## 2022-01-17 ASSESSMENT — LOCATION SIMPLE DESCRIPTION DERM
LOCATION SIMPLE: RIGHT CHEEK
LOCATION SIMPLE: ABDOMEN
LOCATION SIMPLE: RIGHT FOREARM
LOCATION SIMPLE: RIGHT FOREHEAD
LOCATION SIMPLE: CHEST
LOCATION SIMPLE: LEFT FOREHEAD
LOCATION SIMPLE: LEFT CHEEK
LOCATION SIMPLE: LEFT FOREARM
LOCATION SIMPLE: LEFT TEMPLE

## 2022-01-17 ASSESSMENT — LOCATION DETAILED DESCRIPTION DERM
LOCATION DETAILED: LEFT CENTRAL MALAR CHEEK
LOCATION DETAILED: LEFT LATERAL FOREHEAD
LOCATION DETAILED: RIGHT LATERAL MALAR CHEEK
LOCATION DETAILED: RIGHT LATERAL ABDOMEN
LOCATION DETAILED: EPIGASTRIC SKIN
LOCATION DETAILED: LEFT LATERAL SUPERIOR CHEST
LOCATION DETAILED: RIGHT INFERIOR LATERAL FOREHEAD
LOCATION DETAILED: LEFT INFERIOR TEMPLE
LOCATION DETAILED: RIGHT PROXIMAL DORSAL FOREARM
LOCATION DETAILED: LEFT PROXIMAL DORSAL FOREARM
LOCATION DETAILED: RIGHT MEDIAL INFERIOR CHEST

## 2022-01-17 ASSESSMENT — LOCATION ZONE DERM
LOCATION ZONE: FACE
LOCATION ZONE: TRUNK
LOCATION ZONE: ARM

## 2022-01-17 ASSESSMENT — PAIN INTENSITY VAS: HOW INTENSE IS YOUR PAIN 0 BEING NO PAIN, 10 BEING THE MOST SEVERE PAIN POSSIBLE?: NO PAIN

## 2022-01-17 NOTE — HPI: HISTORY OF BASAL CELL CARCINOMA
What Is The Reason For Today's Visit?: History of Basal Cell Carcinoma
How Many Bccs Have You Had?: more than one
When Was Your Last Cancer Diagnosed?: 06/2021

## 2022-11-22 NOTE — PROCEDURE: REASSURANCE
Detail Level: Detailed S/p ablation in 2016. Following with Dr. Pemberton EP. Continue BB as above and AC with Eliquis.   - Continue home Toprol XL 25 mg daily  - Holding Eliquis due to low platelets, will f/u on AM repeat platelet count  - SCD's ordered for DVT ppx  - Spoke to Dr. Bradley and notified him of patient's status in hospital. Okay with holding eliquis until heme/onc deems appropriate to restart. S/p ablation in 2016. Following with Dr. Pemberton EP. Continue BB as above and AC with Eliquis.   - Continue home Toprol XL 25 mg daily  - Restarted eliquis 5mg BID and will continue as long as platelets >50k   - SCD's ordered for DVT ppx  - Spoke to Dr. Bradley and notified him of patient's status in hospital.

## 2022-12-07 ENCOUNTER — APPOINTMENT (RX ONLY)
Dept: URBAN - METROPOLITAN AREA CLINIC 39 | Facility: CLINIC | Age: 72
Setting detail: DERMATOLOGY
End: 2022-12-07

## 2022-12-07 DIAGNOSIS — L81.4 OTHER MELANIN HYPERPIGMENTATION: ICD-10-CM

## 2022-12-07 DIAGNOSIS — L82.1 OTHER SEBORRHEIC KERATOSIS: ICD-10-CM

## 2022-12-07 DIAGNOSIS — Z71.89 OTHER SPECIFIED COUNSELING: ICD-10-CM

## 2022-12-07 DIAGNOSIS — L57.0 ACTINIC KERATOSIS: ICD-10-CM | Status: INADEQUATELY CONTROLLED

## 2022-12-07 DIAGNOSIS — L57.8 OTHER SKIN CHANGES DUE TO CHRONIC EXPOSURE TO NONIONIZING RADIATION: ICD-10-CM | Status: INADEQUATELY CONTROLLED

## 2022-12-07 DIAGNOSIS — Z85.828 PERSONAL HISTORY OF OTHER MALIGNANT NEOPLASM OF SKIN: ICD-10-CM

## 2022-12-07 DIAGNOSIS — D18.0 HEMANGIOMA: ICD-10-CM

## 2022-12-07 DIAGNOSIS — D22 MELANOCYTIC NEVI: ICD-10-CM

## 2022-12-07 PROBLEM — D22.5 MELANOCYTIC NEVI OF TRUNK: Status: ACTIVE | Noted: 2022-12-07

## 2022-12-07 PROBLEM — D48.5 NEOPLASM OF UNCERTAIN BEHAVIOR OF SKIN: Status: ACTIVE | Noted: 2022-12-07

## 2022-12-07 PROBLEM — D18.01 HEMANGIOMA OF SKIN AND SUBCUTANEOUS TISSUE: Status: ACTIVE | Noted: 2022-12-07

## 2022-12-07 PROCEDURE — ? MEDICATION COUNSELING

## 2022-12-07 PROCEDURE — ? PRESCRIPTION MEDICATION MANAGEMENT

## 2022-12-07 PROCEDURE — ? SUNSCREEN RECOMMENDATIONS

## 2022-12-07 PROCEDURE — ? BIOPSY BY SHAVE METHOD

## 2022-12-07 PROCEDURE — ? PRESCRIPTION

## 2022-12-07 PROCEDURE — 11102 TANGNTL BX SKIN SINGLE LES: CPT | Mod: 59

## 2022-12-07 PROCEDURE — ? LIQUID NITROGEN

## 2022-12-07 PROCEDURE — ? COUNSELING

## 2022-12-07 PROCEDURE — 17004 DESTROY PREMAL LESIONS 15/>: CPT

## 2022-12-07 PROCEDURE — 99214 OFFICE O/P EST MOD 30 MIN: CPT | Mod: 25

## 2022-12-07 ASSESSMENT — LOCATION DETAILED DESCRIPTION DERM
LOCATION DETAILED: LEFT INFERIOR CENTRAL MALAR CHEEK
LOCATION DETAILED: RIGHT SUPERIOR HELIX
LOCATION DETAILED: LEFT POSTERIOR NECK
LOCATION DETAILED: RIGHT DISTAL DORSAL FOREARM
LOCATION DETAILED: RIGHT MID-UPPER BACK
LOCATION DETAILED: RIGHT SUPERIOR POSTAURICULAR SKIN
LOCATION DETAILED: LEFT MEDIAL INFERIOR CHEST
LOCATION DETAILED: RIGHT RADIAL DORSAL HAND
LOCATION DETAILED: RIGHT SUPERIOR LATERAL UPPER BACK
LOCATION DETAILED: LEFT RADIAL DORSAL HAND
LOCATION DETAILED: LEFT SUPERIOR HELIX
LOCATION DETAILED: LEFT MEDIAL SUPERIOR CHEST
LOCATION DETAILED: LEFT DISTAL DORSAL FOREARM
LOCATION DETAILED: LEFT POSTERIOR SHOULDER

## 2022-12-07 ASSESSMENT — LOCATION SIMPLE DESCRIPTION DERM
LOCATION SIMPLE: LEFT SHOULDER
LOCATION SIMPLE: RIGHT FOREARM
LOCATION SIMPLE: LEFT EAR
LOCATION SIMPLE: LEFT CHEEK
LOCATION SIMPLE: POSTERIOR NECK
LOCATION SIMPLE: RIGHT EAR
LOCATION SIMPLE: RIGHT HAND
LOCATION SIMPLE: RIGHT UPPER BACK
LOCATION SIMPLE: LEFT FOREARM
LOCATION SIMPLE: LEFT HAND
LOCATION SIMPLE: CHEST
LOCATION SIMPLE: SCALP

## 2022-12-07 ASSESSMENT — PAIN INTENSITY VAS: HOW INTENSE IS YOUR PAIN 0 BEING NO PAIN, 10 BEING THE MOST SEVERE PAIN POSSIBLE?: NO PAIN

## 2022-12-07 ASSESSMENT — LOCATION ZONE DERM
LOCATION ZONE: HAND
LOCATION ZONE: SCALP
LOCATION ZONE: NECK
LOCATION ZONE: ARM
LOCATION ZONE: EAR
LOCATION ZONE: TRUNK
LOCATION ZONE: FACE

## 2022-12-07 NOTE — PROCEDURE: LIQUID NITROGEN
Render In Bullet Format When Appropriate: No
Render Post-Care Instructions In Note?: yes
Post-Care Instructions: I reviewed with the patient in detail post-care instructions. Patient is to wear sunprotection, and avoid picking at any of the treated lesions. Pt may apply Vaseline to crusted or scabbing areas.
Number Of Freeze-Thaw Cycles: 1 freeze-thaw cycle
Duration Of Freeze Thaw-Cycle (Seconds): 20
Total Number Of Aks Treated: 30
Detail Level: Zone
Consent: The patient's consent was obtained including but not limited to risks of crusting, scabbing, blistering, scarring, darker or lighter pigmentary change, recurrence, incomplete removal and infection.

## 2022-12-07 NOTE — PROCEDURE: MEDICATION COUNSELING
Cyclosporine Counseling:  I discussed with the patient the risks of cyclosporine including but not limited to hypertension, gingival hyperplasia,myelosuppression, immunosuppression, liver damage, kidney damage, neurotoxicity, lymphoma, and serious infections. The patient understands that monitoring is required including baseline blood pressure, CBC, CMP, lipid panel and uric acid, and then 1-2 times monthly CMP and blood pressure.
Colchicine Counseling:  Patient counseled regarding adverse effects including but not limited to stomach upset (nausea, vomiting, stomach pain, or diarrhea).  Patient instructed to limit alcohol consumption while taking this medication.  Colchicine may reduce blood counts especially with prolonged use.  The patient understands that monitoring of kidney function and blood counts may be required, especially at baseline. The patient verbalized understanding of the proper use and possible adverse effects of colchicine.  All of the patient's questions and concerns were addressed.
Azelaic Acid Counseling: Patient counseled that medicine may cause skin irritation and to avoid applying near the eyes.  In the event of skin irritation, the patient was advised to reduce the amount of the drug applied or use it less frequently.   The patient verbalized understanding of the proper use and possible adverse effects of azelaic acid.  All of the patient's questions and concerns were addressed.
Tremfya Counseling: I discussed with the patient the risks of guselkumab including but not limited to immunosuppression, serious infections, worsening of inflammatory bowel disease and drug reactions.  The patient understands that monitoring is required including a PPD at baseline and must alert us or the primary physician if symptoms of infection or other concerning signs are noted.
Solaraze Pregnancy And Lactation Text: This medication is Pregnancy Category B and is considered safe. There is some data to suggest avoiding during the third trimester. It is unknown if this medication is excreted in breast milk.
Fluconazole Pregnancy And Lactation Text: This medication is Pregnancy Category C and it isn't know if it is safe during pregnancy. It is also excreted in breast milk.
Adbry Counseling: I discussed with the patient the risks of tralokinumab including but not limited to eye infection and irritation, cold sores, injection site reactions, worsening of asthma, allergic reactions and increased risk of parasitic infection.  Live vaccines should be avoided while taking tralokinumab. The patient understands that monitoring is required and they must alert us or the primary physician if symptoms of infection or other concerning signs are noted.
Hydroxychloroquine Pregnancy And Lactation Text: This medication has been shown to cause fetal harm but it isn't assigned a Pregnancy Risk Category. There are small amounts excreted in breast milk.
Siliq Pregnancy And Lactation Text: The risk during pregnancy and breastfeeding is uncertain with this medication.
Doxepin Counseling:  Patient advised that the medication is sedating and not to drive a car after taking this medication. Patient informed of potential adverse effects including but not limited to dry mouth, urinary retention, and blurry vision.  The patient verbalized understanding of the proper use and possible adverse effects of doxepin.  All of the patient's questions and concerns were addressed.
Thalidomide Counseling: I discussed with the patient the risks of thalidomide including but not limited to birth defects, anxiety, weakness, chest pain, dizziness, cough and severe allergy.
Odomzo Pregnancy And Lactation Text: This medication is Pregnancy Category X and is absolutely contraindicated during pregnancy. It is unknown if it is excreted in breast milk.
Cantharidin Pregnancy And Lactation Text: The use of this medication during pregnancy or lactation is not recommended as there is insufficient data.
Quinolones Counseling:  I discussed with the patient the risks of fluoroquinolones including but not limited to GI upset, allergic reaction, drug rash, diarrhea, dizziness, photosensitivity, yeast infections, liver function test abnormalities, tendonitis/tendon rupture.
Olumiant Pregnancy And Lactation Text: Based on animal studies, Olumiant may cause embryo-fetal harm when administered to pregnant women.  The medication should not be used in pregnancy.  Breastfeeding is not recommended during treatment.
Topical Sulfur Applications Pregnancy And Lactation Text: This medication is Pregnancy Category C and has an unknown safety profile during pregnancy. It is unknown if this topical medication is excreted in breast milk.
Humira Counseling:  I discussed with the patient the risks of adalimumab including but not limited to myelosuppression, immunosuppression, autoimmune hepatitis, demyelinating diseases, lymphoma, and serious infections.  The patient understands that monitoring is required including a PPD at baseline and must alert us or the primary physician if symptoms of infection or other concerning signs are noted.
Oral Minoxidil Pregnancy And Lactation Text: This medication should only be used when clearly needed if you are pregnant, attempting to become pregnant or breast feeding.
Zyclara Counseling:  I discussed with the patient the risks of imiquimod including but not limited to erythema, scaling, itching, weeping, crusting, and pain.  Patient understands that the inflammatory response to imiquimod is variable from person to person and was educated regarded proper titration schedule.  If flu-like symptoms develop, patient knows to discontinue the medication and contact us.
Bexarotene Counseling:  I discussed with the patient the risks of bexarotene including but not limited to hair loss, dry lips/skin/eyes, liver abnormalities, hyperlipidemia, pancreatitis, depression/suicidal ideation, photosensitivity, drug rash/allergic reactions, hypothyroidism, anemia, leukopenia, infection, cataracts, and teratogenicity.  Patient understands that they will need regular blood tests to check lipid profile, liver function tests, white blood cell count, thyroid function tests and pregnancy test if applicable.
Opzelura Counseling:  I discussed with the patient the risks of Opzelura including but not limited to nasopharngitis, bronchitis, ear infection, eosinophila, hives, diarrhea, folliculitis, tonsillitis, and rhinorrhea.  Taken orally, this medication has been linked to serious infections; higher rate of mortality; malignancy and lymphoproliferative disorders; major adverse cardiovascular events; thrombosis; thrombocytopenia, anemia, and neutropenia; and lipid elevations.
Cimetidine Counseling:  I discussed with the patient the risks of Cimetidine including but not limited to gynecomastia, headache, diarrhea, nausea, drowsiness, arrhythmias, pancreatitis, skin rashes, psychosis, bone marrow suppression and kidney toxicity.
Azathioprine Counseling:  I discussed with the patient the risks of azathioprine including but not limited to myelosuppression, immunosuppression, hepatotoxicity, lymphoma, and infections.  The patient understands that monitoring is required including baseline LFTs, Creatinine, possible TPMP genotyping and weekly CBCs for the first month and then every 2 weeks thereafter.  The patient verbalized understanding of the proper use and possible adverse effects of azathioprine.  All of the patient's questions and concerns were addressed.
Adbry Pregnancy And Lactation Text: It is unknown if this medication will adversely affect pregnancy or breast feeding.
Clindamycin Pregnancy And Lactation Text: This medication can be used in pregnancy if certain situations. Clindamycin is also present in breast milk.
Low Dose Naltrexone Counseling- I discussed with the patient the potential risks and side effects of low dose naltrexone including but not limited to: more vivid dreams, headaches, nausea, vomiting, abdominal pain, fatigue, dizziness, and anxiety.
Hydroquinone Pregnancy And Lactation Text: This medication has not been assigned a Pregnancy Risk Category but animal studies failed to show danger with the topical medication. It is unknown if the medication is excreted in breast milk.
Topical Retinoid counseling:  Patient advised to apply a pea-sized amount only at bedtime and wait 30 minutes after washing their face before applying.  If too drying, patient may add a non-comedogenic moisturizer. The patient verbalized understanding of the proper use and possible adverse effects of retinoids.  All of the patient's questions and concerns were addressed.
Simponi Counseling:  I discussed with the patient the risks of golimumab including but not limited to myelosuppression, immunosuppression, autoimmune hepatitis, demyelinating diseases, lymphoma, and serious infections.  The patient understands that monitoring is required including a PPD at baseline and must alert us or the primary physician if symptoms of infection or other concerning signs are noted.
Colchicine Pregnancy And Lactation Text: This medication is Pregnancy Category C and isn't considered safe during pregnancy. It is excreted in breast milk.
Doxepin Pregnancy And Lactation Text: This medication is Pregnancy Category C and it isn't known if it is safe during pregnancy. It is also excreted in breast milk and breast feeding isn't recommended.
Griseofulvin Counseling:  I discussed with the patient the risks of griseofulvin including but not limited to photosensitivity, cytopenia, liver damage, nausea/vomiting and severe allergy.  The patient understands that this medication is best absorbed when taken with a fatty meal (e.g., ice cream or french fries).
Bexarotene Pregnancy And Lactation Text: This medication is Pregnancy Category X and should not be given to women who are pregnant or may become pregnant. This medication should not be used if you are breast feeding.
Opzelura Pregnancy And Lactation Text: There is insufficient data to evaluate drug-associated risk for major birth defects, miscarriage, or other adverse maternal or fetal outcomes.  There is a pregnancy registry that monitors pregnancy outcomes in pregnant persons exposed to the medication during pregnancy.  It is unknown if this medication is excreted in breast milk.  Do not breastfeed during treatment and for about 4 weeks after the last dose.
Doxycycline Counseling:  Patient counseled regarding possible photosensitivity and increased risk for sunburn.  Patient instructed to avoid sunlight, if possible.  When exposed to sunlight, patients should wear protective clothing, sunglasses, and sunscreen.  The patient was instructed to call the office immediately if the following severe adverse effects occur:  hearing changes, easy bruising/bleeding, severe headache, or vision changes.  The patient verbalized understanding of the proper use and possible adverse effects of doxycycline.  All of the patient's questions and concerns were addressed.
Rinvoq Counseling: I discussed with the patient the risks of Rinvoq therapy including but not limited to upper respiratory tract infections, shingles, cold sores, bronchitis, nausea, cough, fever, acne, and headache. Live vaccines should be avoided.  This medication has been linked to serious infections; higher rate of mortality; malignancy and lymphoproliferative disorders; major adverse cardiovascular events; thrombosis; thrombocytopenia, anemia, and neutropenia; lipid elevations; liver enzyme elevations; and gastrointestinal perforations.
Cyclosporine Pregnancy And Lactation Text: This medication is Pregnancy Category C and it isn't know if it is safe during pregnancy. This medication is excreted in breast milk.
5-Fu Counseling: 5-Fluorouracil Counseling:  I discussed with the patient the risks of 5-fluorouracil including but not limited to erythema, scaling, itching, weeping, crusting, and pain.
Zyclara Pregnancy And Lactation Text: This medication is Pregnancy Category C. It is unknown if this medication is excreted in breast milk.
Azelaic Acid Pregnancy And Lactation Text: This medication is considered safe during pregnancy and breast feeding.
Cimzia Counseling:  I discussed with the patient the risks of Cimzia including but not limited to immunosuppression, allergic reactions and infections.  The patient understands that monitoring is required including a PPD at baseline and must alert us or the primary physician if symptoms of infection or other concerning signs are noted.
Low Dose Naltrexone Pregnancy And Lactation Text: Naltrexone is pregnancy category C.  There have been no adequate and well-controlled studies in pregnant women.  It should be used in pregnancy only if the potential benefit justifies the potential risk to the fetus.   Limited data indicates that naltrexone is minimally excreted into breastmilk.
5-Fu Pregnancy And Lactation Text: This medication is Pregnancy Category X and contraindicated in pregnancy and in women who may become pregnant. It is unknown if this medication is excreted in breast milk.
Rifampin Counseling: I discussed with the patient the risks of rifampin including but not limited to liver damage, kidney damage, red-orange body fluids, nausea/vomiting and severe allergy.
Wartpeel Counseling:  I discussed with the patient the risks of Wartpeel including but not limited to erythema, scaling, itching, weeping, crusting, and pain.
Humira Pregnancy And Lactation Text: This medication is Pregnancy Category B and is considered safe during pregnancy. It is unknown if this medication is excreted in breast milk.
Otezla Counseling: The side effects of Otezla were discussed with the patient, including but not limited to worsening or new depression, weight loss, diarrhea, nausea, upper respiratory tract infection, and headache. Patient instructed to call the office should any adverse effect occur.  The patient verbalized understanding of the proper use and possible adverse effects of Otezla.  All the patient's questions and concerns were addressed.
Dutasteride Counseling: Dustasteride Counseling:  I discussed with the patient the risks of use of dutasteride including but not limited to decreased libido, decreased ejaculate volume, and gynecomastia. Women who can become pregnant should not handle medication.  All of the patient's questions and concerns were addressed.
Xolair Counseling:  Patient informed of potential adverse effects including but not limited to fever, muscle aches, rash and allergic reactions.  The patient verbalized understanding of the proper use and possible adverse effects of Xolair.  All of the patient's questions and concerns were addressed.
Rinvoq Pregnancy And Lactation Text: Based on animal studies, Rinvoq may cause embryo-fetal harm when administered to pregnant women.  The medication should not be used in pregnancy.  Breastfeeding is not recommended during treatment and for 6 days after the last dose.
Methotrexate Counseling:  Patient counseled regarding adverse effects of methotrexate including but not limited to nausea, vomiting, abnormalities in liver function tests. Patients may develop mouth sores, rash, diarrhea, and abnormalities in blood counts. The patient understands that monitoring is required including LFT's and blood counts.  There is a rare possibility of scarring of the liver and lung problems that can occur when taking methotrexate. Persistent nausea, loss of appetite, pale stools, dark urine, cough, and shortness of breath should be reported immediately. Patient advised to discontinue methotrexate treatment at least three months before attempting to become pregnant.  I discussed the need for folate supplements while taking methotrexate.  These supplements can decrease side effects during methotrexate treatment. The patient verbalized understanding of the proper use and possible adverse effects of methotrexate.  All of the patient's questions and concerns were addressed.
Azathioprine Pregnancy And Lactation Text: This medication is Pregnancy Category D and isn't considered safe during pregnancy. It is unknown if this medication is excreted in breast milk.
Imiquimod Counseling:  I discussed with the patient the risks of imiquimod including but not limited to erythema, scaling, itching, weeping, crusting, and pain.  Patient understands that the inflammatory response to imiquimod is variable from person to person and was educated regarded proper titration schedule.  If flu-like symptoms develop, patient knows to discontinue the medication and contact us.
Hydroxyzine Counseling: Patient advised that the medication is sedating and not to drive a car after taking this medication.  Patient informed of potential adverse effects including but not limited to dry mouth, urinary retention, and blurry vision.  The patient verbalized understanding of the proper use and possible adverse effects of hydroxyzine.  All of the patient's questions and concerns were addressed.
Tranexamic Acid Counseling:  Patient advised of the small risk of bleeding problems with tranexamic acid. They were also instructed to call if they developed any nausea, vomiting or diarrhea. All of the patient's questions and concerns were addressed.
Dapsone Counseling: I discussed with the patient the risks of dapsone including but not limited to hemolytic anemia, agranulocytosis, rashes, methemoglobinemia, kidney failure, peripheral neuropathy, headaches, GI upset, and liver toxicity.  Patients who start dapsone require monitoring including baseline LFTs and weekly CBCs for the first month, then every month thereafter.  The patient verbalized understanding of the proper use and possible adverse effects of dapsone.  All of the patient's questions and concerns were addressed.
Benzoyl Peroxide Counseling: Patient counseled that medicine may cause skin irritation and bleach clothing.  In the event of skin irritation, the patient was advised to reduce the amount of the drug applied or use it less frequently.   The patient verbalized understanding of the proper use and possible adverse effects of benzoyl peroxide.  All of the patient's questions and concerns were addressed.
Ilumya Counseling: I discussed with the patient the risks of tildrakizumab including but not limited to immunosuppression, malignancy, posterior leukoencephalopathy syndrome, and serious infections.  The patient understands that monitoring is required including a PPD at baseline and must alert us or the primary physician if symptoms of infection or other concerning signs are noted.
Griseofulvin Pregnancy And Lactation Text: This medication is Pregnancy Category X and is known to cause serious birth defects. It is unknown if this medication is excreted in breast milk but breast feeding should be avoided.
Otezla Pregnancy And Lactation Text: This medication is Pregnancy Category C and it isn't known if it is safe during pregnancy. It is unknown if it is excreted in breast milk.
Dutasteride Pregnancy And Lactation Text: This medication is absolutely contraindicated in women, especially during pregnancy and breast feeding. Feminization of male fetuses is possible if taking while pregnant.
Isotretinoin Counseling: Patient should get monthly blood tests, not donate blood, not drive at night if vision affected, not share medication, and not undergo elective surgery for 6 months after tx completed. Side effects reviewed, pt to contact office should one occur.
Picato Counseling:  I discussed with the patient the risks of Picato including but not limited to erythema, scaling, itching, weeping, crusting, and pain.
Doxycycline Pregnancy And Lactation Text: This medication is Pregnancy Category D and not consider safe during pregnancy. It is also excreted in breast milk but is considered safe for shorter treatment courses.
Xolair Pregnancy And Lactation Text: This medication is Pregnancy Category B and is considered safe during pregnancy. This medication is excreted in breast milk.
Tazorac Counseling:  Patient advised that medication is irritating and drying.  Patient may need to apply sparingly and wash off after an hour before eventually leaving it on overnight.  The patient verbalized understanding of the proper use and possible adverse effects of tazorac.  All of the patient's questions and concerns were addressed.
Cimzia Pregnancy And Lactation Text: This medication crosses the placenta but can be considered safe in certain situations. Cimzia may be excreted in breast milk.
Methotrexate Pregnancy And Lactation Text: This medication is Pregnancy Category X and is known to cause fetal harm. This medication is excreted in breast milk.
Benzoyl Peroxide Pregnancy And Lactation Text: This medication is Pregnancy Category C. It is unknown if benzoyl peroxide is excreted in breast milk.
Cellcept Counseling:  I discussed with the patient the risks of mycophenolate mofetil including but not limited to infection/immunosuppression, GI upset, hypokalemia, hypercholesterolemia, bone marrow suppression, lymphoproliferative disorders, malignancy, GI ulceration/bleed/perforation, colitis, interstitial lung disease, kidney failure, progressive multifocal leukoencephalopathy, and birth defects.  The patient understands that monitoring is required including a baseline creatinine and regular CBC testing. In addition, patient must alert us immediately if symptoms of infection or other concerning signs are noted.
Rifampin Pregnancy And Lactation Text: This medication is Pregnancy Category C and it isn't know if it is safe during pregnancy. It is also excreted in breast milk and should not be used if you are breast feeding.
Opioid Counseling: I discussed with the patient the potential side effects of opioids including but not limited to addiction, altered mental status, and depression. I stressed avoiding alcohol, benzodiazepines, muscle relaxants and sleep aids unless specifically okayed by a physician. The patient verbalized understanding of the proper use and possible adverse effects of opioids. All of the patient's questions and concerns were addressed. They were instructed to flush the remaining pills down the toilet if they did not need them for pain.
Niacinamide Counseling: I recommended taking niacin or niacinamide, also know as vitamin B3, twice daily. Recent evidence suggests that taking vitamin B3 (500 mg twice daily) can reduce the risk of actinic keratoses and non-melanoma skin cancers. Side effects of vitamin B3 include flushing and headache.
Itraconazole Counseling:  I discussed with the patient the risks of itraconazole including but not limited to liver damage, nausea/vomiting, neuropathy, and severe allergy.  The patient understands that this medication is best absorbed when taken with acidic beverages such as non-diet cola or ginger ale.  The patient understands that monitoring is required including baseline LFTs and repeat LFTs at intervals.  The patient understands that they are to contact us or the primary physician if concerning signs are noted.
Sotyktu Counseling:  I discussed the most common side effects of Sotyktu including: common cold, sore throat, sinus infections, cold sores, canker sores, folliculitis, and acne.  I also discussed more serious side effects of Sotyktu including but not limited to: serious allergic reactions; increased risk for infections such as TB; cancers such as lymphomas; rhabdomyolysis and elevated CPK; and elevated triglycerides and liver enzymes. 
Hydroxyzine Pregnancy And Lactation Text: This medication is not safe during pregnancy and should not be taken. It is also excreted in breast milk and breast feeding isn't recommended.
Azithromycin Counseling:  I discussed with the patient the risks of azithromycin including but not limited to GI upset, allergic reaction, drug rash, diarrhea, and yeast infections.
Tranexamic Acid Pregnancy And Lactation Text: It is unknown if this medication is safe during pregnancy or breast feeding.
Drysol Counseling:  I discussed with the patient the risks of drysol/aluminum chloride including but not limited to skin rash, itching, irritation, burning.
Dapsone Pregnancy And Lactation Text: This medication is Pregnancy Category C and is not considered safe during pregnancy or breast feeding.
Oxybutynin Counseling:  I discussed with the patient the risks of oxybutynin including but not limited to skin rash, drowsiness, dry mouth, difficulty urinating, and blurred vision.
Klisyri Counseling:  I discussed with the patient the risks of Klisyri including but not limited to erythema, scaling, itching, weeping, crusting, and pain.
Azithromycin Pregnancy And Lactation Text: This medication is considered safe during pregnancy and is also secreted in breast milk.
Skyrizi Counseling: I discussed with the patient the risks of risankizumab-rzaa including but not limited to immunosuppression, and serious infections.  The patient understands that monitoring is required including a PPD at baseline and must alert us or the primary physician if symptoms of infection or other concerning signs are noted.
Erythromycin Counseling:  I discussed with the patient the risks of erythromycin including but not limited to GI upset, allergic reaction, drug rash, diarrhea, increase in liver enzymes, and yeast infections.
Finasteride Counseling:  I discussed with the patient the risks of use of finasteride including but not limited to decreased libido, decreased ejaculate volume, gynecomastia, and depression. Women should not handle medication.  All of the patient's questions and concerns were addressed.
Winlevi Counseling:  I discussed with the patient the risks of topical clascoterone including but not limited to erythema, scaling, itching, and stinging. Patient voiced their understanding.
Isotretinoin Pregnancy And Lactation Text: This medication is Pregnancy Category X and is considered extremely dangerous during pregnancy. It is unknown if it is excreted in breast milk.
Gabapentin Counseling: I discussed with the patient the risks of gabapentin including but not limited to dizziness, somnolence, fatigue and ataxia.
Opioid Pregnancy And Lactation Text: These medications can lead to premature delivery and should be avoided during pregnancy. These medications are also present in breast milk in small amounts.
Valtrex Counseling: I discussed with the patient the risks of valacyclovir including but not limited to kidney damage, nausea, vomiting and severe allergy.  The patient understands that if the infection seems to be worsening or is not improving, they are to call.
Carac Counseling:  I discussed with the patient the risks of Carac including but not limited to erythema, scaling, itching, weeping, crusting, and pain.
Cosentyx Counseling:  I discussed with the patient the risks of Cosentyx including but not limited to worsening of Crohn's disease, immunosuppression, allergic reactions and infections.  The patient understands that monitoring is required including a PPD at baseline and must alert us or the primary physician if symptoms of infection or other concerning signs are noted.
Erythromycin Pregnancy And Lactation Text: This medication is Pregnancy Category B and is considered safe during pregnancy. It is also excreted in breast milk.
Niacinamide Pregnancy And Lactation Text: These medications are considered safe during pregnancy.
Tazorac Pregnancy And Lactation Text: This medication is not safe during pregnancy. It is unknown if this medication is excreted in breast milk.
Prednisone Counseling:  I discussed with the patient the risks of prolonged use of prednisone including but not limited to weight gain, insomnia, osteoporosis, mood changes, diabetes, susceptibility to infection, glaucoma and high blood pressure.  In cases where prednisone use is prolonged, patients should be monitored with blood pressure checks, serum glucose levels and an eye exam.  Additionally, the patient may need to be placed on GI prophylaxis, PCP prophylaxis, and calcium and vitamin D supplementation and/or a bisphosphonate.  The patient verbalized understanding of the proper use and the possible adverse effects of prednisone.  All of the patient's questions and concerns were addressed.
Erivedge Counseling- I discussed with the patient the risks of Erivedge including but not limited to nausea, vomiting, diarrhea, constipation, weight loss, changes in the sense of taste, decreased appetite, muscle spasms, and hair loss.  The patient verbalized understanding of the proper use and possible adverse effects of Erivedge.  All of the patient's questions and concerns were addressed.
Sarecycline Counseling: Patient advised regarding possible photosensitivity and discoloration of the teeth, skin, lips, tongue and gums.  Patient instructed to avoid sunlight, if possible.  When exposed to sunlight, patients should wear protective clothing, sunglasses, and sunscreen.  The patient was instructed to call the office immediately if the following severe adverse effects occur:  hearing changes, easy bruising/bleeding, severe headache, or vision changes.  The patient verbalized understanding of the proper use and possible adverse effects of sarecycline.  All of the patient's questions and concerns were addressed.
Sotyktu Pregnancy And Lactation Text: There is insufficient data to evaluate whether or not Sotyktu is safe to use during pregnancy.   It is not known if Sotyktu passes into breast milk and whether or not it is safe to use when breastfeeding.  
Bactrim Counseling:  I discussed with the patient the risks of sulfa antibiotics including but not limited to GI upset, allergic reaction, drug rash, diarrhea, dizziness, photosensitivity, and yeast infections.  Rarely, more serious reactions can occur including but not limited to aplastic anemia, agranulocytosis, methemoglobinemia, blood dyscrasias, liver or kidney failure, lung infiltrates or desquamative/blistering drug rashes.
Winlevi Pregnancy And Lactation Text: This medication is considered safe during pregnancy and breastfeeding.
Finasteride Pregnancy And Lactation Text: This medication is absolutely contraindicated during pregnancy. It is unknown if it is excreted in breast milk.
Albendazole Counseling:  I discussed with the patient the risks of albendazole including but not limited to cytopenia, kidney damage, nausea/vomiting and severe allergy.  The patient understands that this medication is being used in an off-label manner.
Elidel Counseling: Patient may experience a mild burning sensation during topical application. Elidel is not approved in children less than 2 years of age. There have been case reports of hematologic and skin malignancies in patients using topical calcineurin inhibitors although causality is questionable.
Oxybutynin Pregnancy And Lactation Text: This medication is Pregnancy Category B and is considered safe during pregnancy. It is unknown if it is excreted in breast milk.
Infliximab Counseling:  I discussed with the patient the risks of infliximab including but not limited to myelosuppression, immunosuppression, autoimmune hepatitis, demyelinating diseases, lymphoma, and serious infections.  The patient understands that monitoring is required including a PPD at baseline and must alert us or the primary physician if symptoms of infection or other concerning signs are noted.
High Dose Vitamin A Counseling: Side effects reviewed, pt to contact office should one occur.
Protopic Counseling: Patient may experience a mild burning sensation during topical application. Protopic is not approved in children less than 2 years of age. There have been case reports of hematologic and skin malignancies in patients using topical calcineurin inhibitors although causality is questionable.
Nsaids Counseling: NSAID Counseling: I discussed with the patient that NSAIDs should be taken with food. Prolonged use of NSAIDs can result in the development of stomach ulcers.  Patient advised to stop taking NSAIDs if abdominal pain occurs.  The patient verbalized understanding of the proper use and possible adverse effects of NSAIDs.  All of the patient's questions and concerns were addressed.
Klisyri Pregnancy And Lactation Text: It is unknown if this medication can harm a developing fetus or if it is excreted in breast milk.
Topical Clindamycin Counseling: Patient counseled that this medication may cause skin irritation or allergic reactions.  In the event of skin irritation, the patient was advised to reduce the amount of the drug applied or use it less frequently.   The patient verbalized understanding of the proper use and possible adverse effects of clindamycin.  All of the patient's questions and concerns were addressed.
Cyclophosphamide Counseling:  I discussed with the patient the risks of cyclophosphamide including but not limited to hair loss, hormonal abnormalities, decreased fertility, abdominal pain, diarrhea, nausea and vomiting, bone marrow suppression and infection. The patient understands that monitoring is required while taking this medication.
Stelara Counseling:  I discussed with the patient the risks of ustekinumab including but not limited to immunosuppression, malignancy, posterior leukoencephalopathy syndrome, and serious infections.  The patient understands that monitoring is required including a PPD at baseline and must alert us or the primary physician if symptoms of infection or other concerning signs are noted.
Protopic Pregnancy And Lactation Text: This medication is Pregnancy Category C. It is unknown if this medication is excreted in breast milk when applied topically.
High Dose Vitamin A Pregnancy And Lactation Text: High dose vitamin A therapy is contraindicated during pregnancy and breast feeding.
Xeljanz Counseling: I discussed with the patient the risks of Xeljanz therapy including increased risk of infection, liver issues, headache, diarrhea, or cold symptoms. Live vaccines should be avoided. They were instructed to call if they have any problems.
Metronidazole Counseling:  I discussed with the patient the risks of metronidazole including but not limited to seizures, nausea/vomiting, a metallic taste in the mouth, nausea/vomiting and severe allergy.
Albendazole Pregnancy And Lactation Text: This medication is Pregnancy Category C and it isn't known if it is safe during pregnancy. It is also excreted in breast milk.
Sarecycline Pregnancy And Lactation Text: This medication is Pregnancy Category D and not consider safe during pregnancy. It is also excreted in breast milk.
Ketoconazole Counseling:   Patient counseled regarding improving absorption with orange juice.  Adverse effects include but are not limited to breast enlargement, headache, diarrhea, nausea, upset stomach, liver function test abnormalities, taste disturbance, and stomach pain.  There is a rare possibility of liver failure that can occur when taking ketoconazole. The patient understands that monitoring of LFTs may be required, especially at baseline. The patient verbalized understanding of the proper use and possible adverse effects of ketoconazole.  All of the patient's questions and concerns were addressed.
Arava Counseling:  Patient counseled regarding adverse effects of Arava including but not limited to nausea, vomiting, abnormalities in liver function tests. Patients may develop mouth sores, rash, diarrhea, and abnormalities in blood counts. The patient understands that monitoring is required including LFTs and blood counts.  There is a rare possibility of scarring of the liver and lung problems that can occur when taking methotrexate. Persistent nausea, loss of appetite, pale stools, dark urine, cough, and shortness of breath should be reported immediately. Patient advised to discontinue Arava treatment and consult with a physician prior to attempting conception. The patient will have to undergo a treatment to eliminate Arava from the body prior to conception.
Propranolol Counseling:  I discussed with the patient the risks of propranolol including but not limited to low heart rate, low blood pressure, low blood sugar, restlessness and increased cold sensitivity. They should call the office if they experience any of these side effects.
Nsaids Pregnancy And Lactation Text: These medications are considered safe up to 30 weeks gestation. It is excreted in breast milk.
Dupixent Counseling: I discussed with the patient the risks of dupilumab including but not limited to eye infection and irritation, cold sores, injection site reactions, worsening of asthma, allergic reactions and increased risk of parasitic infection.  Live vaccines should be avoided while taking dupilumab. Dupilumab will also interact with certain medications such as warfarin and cyclosporine. The patient understands that monitoring is required and they must alert us or the primary physician if symptoms of infection or other concerning signs are noted.
Tetracycline Counseling: Patient counseled regarding possible photosensitivity and increased risk for sunburn.  Patient instructed to avoid sunlight, if possible.  When exposed to sunlight, patients should wear protective clothing, sunglasses, and sunscreen.  The patient was instructed to call the office immediately if the following severe adverse effects occur:  hearing changes, easy bruising/bleeding, severe headache, or vision changes.  The patient verbalized understanding of the proper use and possible adverse effects of tetracycline.  All of the patient's questions and concerns were addressed. Patient understands to avoid pregnancy while on therapy due to potential birth defects.
Cyclophosphamide Pregnancy And Lactation Text: This medication is Pregnancy Category D and it isn't considered safe during pregnancy. This medication is excreted in breast milk.
Birth Control Pills Counseling: Birth Control Pill Counseling: I discussed with the patient the potential side effects of OCPs including but not limited to increased risk of stroke, heart attack, thrombophlebitis, deep venous thrombosis, hepatic adenomas, breast changes, GI upset, headaches, and depression.  The patient verbalized understanding of the proper use and possible adverse effects of OCPs. All of the patient's questions and concerns were addressed.
VTAMA Counseling: I discussed with the patient that VTAMA is not for use in the eyes, mouth or mouth. They should call the office if they develop any signs of allergic reactions to VTAMA. The patient verbalized understanding of the proper use and possible adverse effects of VTAMA.  All of the patient's questions and concerns were addressed.
Minoxidil Counseling: Minoxidil is a topical medication which can increase blood flow where it is applied. It is uncertain how this medication increases hair growth. Side effects are uncommon and include stinging and allergic reactions.
Xelaubreyz Pregnancy And Lactation Text: This medication is Pregnancy Category D and is not considered safe during pregnancy.  The risk during breast feeding is also uncertain.
Bactrim Pregnancy And Lactation Text: This medication is Pregnancy Category D and is known to cause fetal risk.  It is also excreted in breast milk.
Glycopyrrolate Counseling:  I discussed with the patient the risks of glycopyrrolate including but not limited to skin rash, drowsiness, dry mouth, difficulty urinating, and blurred vision.
Metronidazole Pregnancy And Lactation Text: This medication is Pregnancy Category B and considered safe during pregnancy.  It is also excreted in breast milk.
Propranolol Pregnancy And Lactation Text: This medication is Pregnancy Category C and it isn't known if it is safe during pregnancy. It is excreted in breast milk.
Cephalexin Counseling: I counseled the patient regarding use of cephalexin as an antibiotic for prophylactic and/or therapeutic purposes. Cephalexin (commonly prescribed under brand name Keflex) is a cephalosporin antibiotic which is active against numerous classes of bacteria, including most skin bacteria. Side effects may include nausea, diarrhea, gastrointestinal upset, rash, hives, yeast infections, and in rare cases, hepatitis, kidney disease, seizures, fever, confusion, neurologic symptoms, and others. Patients with severe allergies to penicillin medications are cautioned that there is about a 10% incidence of cross-reactivity with cephalosporins. When possible, patients with penicillin allergies should use alternatives to cephalosporins for antibiotic therapy.
Rhofade Counseling: Rhofade is a topical medication which can decrease superficial blood flow where applied. Side effects are uncommon and include stinging, redness and allergic reactions.
Calcipotriene Counseling:  I discussed with the patient the risks of calcipotriene including but not limited to erythema, scaling, itching, and irritation.
Ivermectin Counseling:  Patient instructed to take medication on an empty stomach with a full glass of water.  Patient informed of potential adverse effects including but not limited to nausea, diarrhea, dizziness, itching, and swelling of the extremities or lymph nodes.  The patient verbalized understanding of the proper use and possible adverse effects of ivermectin.  All of the patient's questions and concerns were addressed.
Rituxan Counseling:  I discussed with the patient the risks of Rituxan infusions. Side effects can include infusion reactions, severe drug rashes including mucocutaneous reactions, reactivation of latent hepatitis and other infections and rarely progressive multifocal leukoencephalopathy.  All of the patient's questions and concerns were addressed.
Birth Control Pills Pregnancy And Lactation Text: This medication should be avoided if pregnant and for the first 30 days post-partum.
Qbrexza Counseling:  I discussed with the patient the risks of Qbrexza including but not limited to headache, mydriasis, blurred vision, dry eyes, nasal dryness, dry mouth, dry throat, dry skin, urinary hesitation, and constipation.  Local skin reactions including erythema, burning, stinging, and itching can also occur.
Ketoconazole Pregnancy And Lactation Text: This medication is Pregnancy Category C and it isn't know if it is safe during pregnancy. It is also excreted in breast milk and breast feeding isn't recommended.
Vtama Pregnancy And Lactation Text: It is unknown if this medication can cause problems during pregnancy and breastfeeding.
Libtayo Counseling- I discussed with the patient the risks of Libtayo including but not limited to nausea, vomiting, diarrhea, and bone or muscle pain.  The patient verbalized understanding of the proper use and possible adverse effects of Libtayo.  All of the patient's questions and concerns were addressed.
Cibinqo Counseling: I discussed with the patient the risks of Cibinqo therapy including but not limited to common cold, nausea, headache, cold sores, increased blood CPK levels, dizziness, UTIs, fatigue, acne, and vomitting. Live vaccines should be avoided.  This medication has been linked to serious infections; higher rate of mortality; malignancy and lymphoproliferative disorders; major adverse cardiovascular events; thrombosis; thrombocytopenia and lymphopenia; lipid elevations; and retinal detachment.
Calcipotriene Pregnancy And Lactation Text: This medication has not been proven safe during pregnancy. It is unknown if this medication is excreted in breast milk.
Topical Ketoconazole Counseling: Patient counseled that this medication may cause skin irritation or allergic reactions.  In the event of skin irritation, the patient was advised to reduce the amount of the drug applied or use it less frequently.   The patient verbalized understanding of the proper use and possible adverse effects of ketoconazole.  All of the patient's questions and concerns were addressed.
Terbinafine Counseling: Patient counseling regarding adverse effects of terbinafine including but not limited to headache, diarrhea, rash, upset stomach, liver function test abnormalities, itching, taste/smell disturbance, nausea, abdominal pain, and flatulence.  There is a rare possibility of liver failure that can occur when taking terbinafine.  The patient understands that a baseline LFT and kidney function test may be required. The patient verbalized understanding of the proper use and possible adverse effects of terbinafine.  All of the patient's questions and concerns were addressed.
Olanzapine Counseling- I discussed with the patient the common side effects of olanzapine including but are not limited to: lack of energy, dry mouth, increased appetite, sleepiness, tremor, constipation, dizziness, changes in behavior, or restlessness.  Explained that teenagers are more likely to experience headaches, abdominal pain, pain in the arms or legs, tiredness, and sleepiness.  Serious side effects include but are not limited: increased risk of death in elderly patients who are confused, have memory loss, or dementia-related psychosis; hyperglycemia; increased cholesterol and triglycerides; and weight gain.
Dupixent Pregnancy And Lactation Text: This medication likely crosses the placenta but the risk for the fetus is uncertain. This medication is excreted in breast milk.
Taltz Counseling: I discussed with the patient the risks of ixekizumab including but not limited to immunosuppression, serious infections, worsening of inflammatory bowel disease and drug reactions.  The patient understands that monitoring is required including a PPD at baseline and must alert us or the primary physician if symptoms of infection or other concerning signs are noted.
Glycopyrrolate Pregnancy And Lactation Text: This medication is Pregnancy Category B and is considered safe during pregnancy. It is unknown if it is excreted breast milk.
Rhofade Pregnancy And Lactation Text: This medication has not been assigned a Pregnancy Risk Category. It is unknown if the medication is excreted in breast milk.
Eucrisa Counseling: Patient may experience a mild burning sensation during topical application. Eucrisa is not approved in children less than 2 years of age.
Spironolactone Counseling: Patient advised regarding risks of diarrhea, abdominal pain, hyperkalemia, birth defects (for female patients), liver toxicity and renal toxicity. The patient may need blood work to monitor liver and kidney function and potassium levels while on therapy. The patient verbalized understanding of the proper use and possible adverse effects of spironolactone.  All of the patient's questions and concerns were addressed.
SSKI Counseling:  I discussed with the patient the risks of SSKI including but not limited to thyroid abnormalities, metallic taste, GI upset, fever, headache, acne, arthralgias, paraesthesias, lymphadenopathy, easy bleeding, arrhythmias, and allergic reaction.
Rituxan Pregnancy And Lactation Text: This medication is Pregnancy Category C and it isn't know if it is safe during pregnancy. It is unknown if this medication is excreted in breast milk but similar antibodies are known to be excreted.
Libtayo Pregnancy And Lactation Text: This medication is contraindicated in pregnancy and when breast feeding.
Mirvaso Counseling: Mirvaso is a topical medication which can decrease superficial blood flow where applied. Side effects are uncommon and include stinging, redness and allergic reactions.
Zoryve Counseling:  I discussed with the patient that Zoryve is not for use in the eyes, mouth or vagina. The most commonly reported side effects include diarrhea, headache, insomnia, application site pain, upper respiratory tract infections, and urinary tract infections.  All of the patient's questions and concerns were addressed.
Detail Level: Simple
Cibinqo Pregnancy And Lactation Text: It is unknown if this medication will adversely affect pregnancy or breast feeding.  You should not take this medication if you are currently pregnant or planning a pregnancy or while breastfeeding.
Clofazimine Counseling:  I discussed with the patient the risks of clofazimine including but not limited to skin and eye pigmentation, liver damage, nausea/vomiting, gastrointestinal bleeding and allergy.
Aklief counseling:  Patient advised to apply a pea-sized amount only at bedtime and wait 30 minutes after washing their face before applying.  If too drying, patient may add a non-comedogenic moisturizer.  The most commonly reported side effects including irritation, redness, scaling, dryness, stinging, burning, itching, and increased risk of sunburn.  The patient verbalized understanding of the proper use and possible adverse effects of retinoids.  All of the patient's questions and concerns were addressed.
Qbrexza Pregnancy And Lactation Text: There is no available data on Qbrexza use in pregnant women.  There is no available data on Qbrexza use in lactation.
Minocycline Counseling: Patient advised regarding possible photosensitivity and discoloration of the teeth, skin, lips, tongue and gums.  Patient instructed to avoid sunlight, if possible.  When exposed to sunlight, patients should wear protective clothing, sunglasses, and sunscreen.  The patient was instructed to call the office immediately if the following severe adverse effects occur:  hearing changes, easy bruising/bleeding, severe headache, or vision changes.  The patient verbalized understanding of the proper use and possible adverse effects of minocycline.  All of the patient's questions and concerns were addressed.
Enbrel Counseling:  I discussed with the patient the risks of etanercept including but not limited to myelosuppression, immunosuppression, autoimmune hepatitis, demyelinating diseases, lymphoma, and infections.  The patient understands that monitoring is required including a PPD at baseline and must alert us or the primary physician if symptoms of infection or other concerning signs are noted.
Olanzapine Pregnancy And Lactation Text: This medication is pregnancy category C.   There are no adequate and well controlled trials with olanzapine in pregnant females.  Olanzapine should be used during pregnancy only if the potential benefit justifies the potential risk to the fetus.   In a study in lactating healthy women, olanzapine was excreted in breast milk.  It is recommended that women taking olanzapine should not breast feed.
Acitretin Counseling:  I discussed with the patient the risks of acitretin including but not limited to hair loss, dry lips/skin/eyes, liver damage, hyperlipidemia, depression/suicidal ideation, photosensitivity.  Serious rare side effects can include but are not limited to pancreatitis, pseudotumor cerebri, bony changes, clot formation/stroke/heart attack.  Patient understands that alcohol is contraindicated since it can result in liver toxicity and significantly prolong the elimination of the drug by many years.
Valtrex Pregnancy And Lactation Text: this medication is Pregnancy Category B and is considered safe during pregnancy. This medication is not directly found in breast milk but it's metabolite acyclovir is present.
Cephalexin Pregnancy And Lactation Text: This medication is Pregnancy Category B and considered safe during pregnancy.  It is also excreted in breast milk but can be used safely for shorter doses.
Solaraze Counseling:  I discussed with the patient the risks of Solaraze including but not limited to erythema, scaling, itching, weeping, crusting, and pain.
Hydroxychloroquine Counseling:  I discussed with the patient that a baseline ophthalmologic exam is needed at the start of therapy and every year thereafter while on therapy. A CBC may also be warranted for monitoring.  The side effects of this medication were discussed with the patient, including but not limited to agranulocytosis, aplastic anemia, seizures, rashes, retinopathy, and liver toxicity. Patient instructed to call the office should any adverse effect occur.  The patient verbalized understanding of the proper use and possible adverse effects of Plaquenil.  All the patient's questions and concerns were addressed.
Terbinafine Pregnancy And Lactation Text: This medication is Pregnancy Category B and is considered safe during pregnancy. It is also excreted in breast milk and breast feeding isn't recommended.
Odomzo Counseling- I discussed with the patient the risks of Odomzo including but not limited to nausea, vomiting, diarrhea, constipation, weight loss, changes in the sense of taste, decreased appetite, muscle spasms, and hair loss.  The patient verbalized understanding of the proper use and possible adverse effects of Odomzo.  All of the patient's questions and concerns were addressed.
Olumiant Counseling: I discussed with the patient the risks of Olumiant therapy including but not limited to upper respiratory tract infections, shingles, cold sores, and nausea. Live vaccines should be avoided.  This medication has been linked to serious infections; higher rate of mortality; malignancy and lymphoproliferative disorders; major adverse cardiovascular events; thrombosis; gastrointestinal perforations; neutropenia; lymphopenia; anemia; liver enzyme elevations; and lipid elevations.
Clindamycin Counseling: I counseled the patient regarding use of clindamycin as an antibiotic for prophylactic and/or therapeutic purposes. Clindamycin is active against numerous classes of bacteria, including skin bacteria. Side effects may include nausea, diarrhea, gastrointestinal upset, rash, hives, yeast infections, and in rare cases, colitis.
Oral Minoxidil Counseling- I discussed with the patient the risks of oral minoxidil including but not limited to shortness of breath, swelling of the feet or ankles, dizziness, lightheadedness, unwanted hair growth and allergic reaction.  The patient verbalized understanding of the proper use and possible adverse effects of oral minoxidil.  All of the patient's questions and concerns were addressed.
Hydroquinone Counseling:  Patient advised that medication may result in skin irritation, lightening (hypopigmentation), dryness, and burning.  In the event of skin irritation, the patient was advised to reduce the amount of the drug applied or use it less frequently.  Rarely, spots that are treated with hydroquinone can become darker (pseudoochronosis).  Should this occur, patient instructed to stop medication and call the office. The patient verbalized understanding of the proper use and possible adverse effects of hydroquinone.  All of the patient's questions and concerns were addressed.
Use Enhanced Medication Counseling?: No
Siliq Counseling:  I discussed with the patient the risks of Siliq including but not limited to new or worsening depression, suicidal thoughts and behavior, immunosuppression, malignancy, posterior leukoencephalopathy syndrome, and serious infections.  The patient understands that monitoring is required including a PPD at baseline and must alert us or the primary physician if symptoms of infection or other concerning signs are noted. There is also a special program designed to monitor depression which is required with Siliq.
Spironolactone Pregnancy And Lactation Text: This medication can cause feminization of the male fetus and should be avoided during pregnancy. The active metabolite is also found in breast milk.
Sski Pregnancy And Lactation Text: This medication is Pregnancy Category D and isn't considered safe during pregnancy. It is excreted in breast milk.
Aklief Pregnancy And Lactation Text: It is unknown if this medication is safe to use during pregnancy.  It is unknown if this medication is excreted in breast milk.  Breastfeeding women should use the topical cream on the smallest area of the skin for the shortest time needed while breastfeeding.  Do not apply to nipple and areola.
Fluconazole Counseling:  Patient counseled regarding adverse effects of fluconazole including but not limited to headache, diarrhea, nausea, upset stomach, liver function test abnormalities, taste disturbance, and stomach pain.  There is a rare possibility of liver failure that can occur when taking fluconazole.  The patient understands that monitoring of LFTs and kidney function test may be required, especially at baseline. The patient verbalized understanding of the proper use and possible adverse effects of fluconazole.  All of the patient's questions and concerns were addressed.
Acitretin Pregnancy And Lactation Text: This medication is Pregnancy Category X and should not be given to women who are pregnant or may become pregnant in the future. This medication is excreted in breast milk.
Topical Sulfur Applications Counseling: Topical Sulfur Counseling: Patient counseled that this medication may cause skin irritation or allergic reactions.  In the event of skin irritation, the patient was advised to reduce the amount of the drug applied or use it less frequently.   The patient verbalized understanding of the proper use and possible adverse effects of topical sulfur application.  All of the patient's questions and concerns were addressed.

## 2022-12-07 NOTE — PROCEDURE: PRESCRIPTION MEDICATION MANAGEMENT
Detail Level: Zone
Render In Strict Bullet Format?: No
Initiate Treatment: calcipotriene/fluorouracil: Apply to cheeks BID for 4 days, let heal then repeat on forehead and chest. Apply to tops of hands and forearms Bid for 10 days

## 2022-12-07 NOTE — PROCEDURE: MIPS QUALITY
Quality 111:Pneumonia Vaccination Status For Older Adults: Pneumococcal vaccine (PPSV23) administered on or after patient’s 60th birthday and before the end of the measurement period
Quality 226: Preventive Care And Screening: Tobacco Use: Screening And Cessation Intervention: Patient screened for tobacco use and is an ex/non-smoker
Quality 130: Documentation Of Current Medications In The Medical Record: Current Medications Documented
Detail Level: Detailed
Quality 47: Advance Care Plan: Advance Care Planning discussed and documented in the medical record; patient did not wish or was not able to name a surrogate decision maker or provide an advance care plan.
Quality 110: Preventive Care And Screening: Influenza Immunization: Influenza Immunization previously received during influenza season

## 2023-02-08 ENCOUNTER — APPOINTMENT (RX ONLY)
Dept: URBAN - METROPOLITAN AREA CLINIC 39 | Facility: CLINIC | Age: 73
Setting detail: DERMATOLOGY
End: 2023-02-08

## 2023-02-08 DIAGNOSIS — L57.8 OTHER SKIN CHANGES DUE TO CHRONIC EXPOSURE TO NONIONIZING RADIATION: ICD-10-CM | Status: INADEQUATELY CONTROLLED

## 2023-02-08 DIAGNOSIS — Z09 ENCOUNTER FOR FOLLOW-UP EXAMINATION AFTER COMPLETED TREATMENT FOR CONDITIONS OTHER THAN MALIGNANT NEOPLASM: ICD-10-CM

## 2023-02-08 DIAGNOSIS — L57.0 ACTINIC KERATOSIS: ICD-10-CM | Status: INADEQUATELY CONTROLLED

## 2023-02-08 PROCEDURE — 17000 DESTRUCT PREMALG LESION: CPT

## 2023-02-08 PROCEDURE — 99213 OFFICE O/P EST LOW 20 MIN: CPT | Mod: 25

## 2023-02-08 PROCEDURE — 17003 DESTRUCT PREMALG LES 2-14: CPT

## 2023-02-08 PROCEDURE — ? COUNSELING

## 2023-02-08 PROCEDURE — ? LIQUID NITROGEN

## 2023-02-08 PROCEDURE — ? SUNSCREEN RECOMMENDATIONS

## 2023-02-08 ASSESSMENT — LOCATION ZONE DERM
LOCATION ZONE: ARM
LOCATION ZONE: FACE
LOCATION ZONE: HAND
LOCATION ZONE: NECK
LOCATION ZONE: EAR

## 2023-02-08 ASSESSMENT — LOCATION DETAILED DESCRIPTION DERM
LOCATION DETAILED: RIGHT SUPERIOR ANTERIOR NECK
LOCATION DETAILED: RIGHT INFERIOR CENTRAL MALAR CHEEK
LOCATION DETAILED: LEFT POSTERIOR NECK
LOCATION DETAILED: RIGHT INFERIOR MEDIAL FOREHEAD
LOCATION DETAILED: LEFT INFERIOR ANTERIOR NECK
LOCATION DETAILED: RIGHT DISTAL DORSAL FOREARM
LOCATION DETAILED: LEFT ULNAR DORSAL HAND
LOCATION DETAILED: LEFT DISTAL DORSAL FOREARM
LOCATION DETAILED: LEFT SUPERIOR HELIX
LOCATION DETAILED: RIGHT SUPERIOR HELIX
LOCATION DETAILED: LEFT INFERIOR CENTRAL MALAR CHEEK
LOCATION DETAILED: RIGHT ULNAR DORSAL HAND

## 2023-02-08 ASSESSMENT — LOCATION SIMPLE DESCRIPTION DERM
LOCATION SIMPLE: LEFT EAR
LOCATION SIMPLE: RIGHT CHEEK
LOCATION SIMPLE: RIGHT ANTERIOR NECK
LOCATION SIMPLE: RIGHT FOREARM
LOCATION SIMPLE: POSTERIOR NECK
LOCATION SIMPLE: LEFT ANTERIOR NECK
LOCATION SIMPLE: RIGHT FOREHEAD
LOCATION SIMPLE: LEFT HAND
LOCATION SIMPLE: RIGHT EAR
LOCATION SIMPLE: LEFT CHEEK
LOCATION SIMPLE: LEFT FOREARM
LOCATION SIMPLE: RIGHT HAND

## 2023-02-08 ASSESSMENT — PAIN INTENSITY VAS: HOW INTENSE IS YOUR PAIN 0 BEING NO PAIN, 10 BEING THE MOST SEVERE PAIN POSSIBLE?: NO PAIN

## 2023-02-08 NOTE — PROCEDURE: LIQUID NITROGEN
Render In Bullet Format When Appropriate: No
Total Number Of Aks Treated: 6
Detail Level: Zone
Post-Care Instructions: I reviewed with the patient in detail post-care instructions. Patient is to wear sunprotection, and avoid picking at any of the treated lesions. Pt may apply Vaseline to crusted or scabbing areas.
Number Of Freeze-Thaw Cycles: 1 freeze-thaw cycle
Render Post-Care Instructions In Note?: yes
Duration Of Freeze Thaw-Cycle (Seconds): 20
Consent: The patient's consent was obtained including but not limited to risks of crusting, scabbing, blistering, scarring, darker or lighter pigmentary change, recurrence, incomplete removal and infection.

## 2023-02-14 ENCOUNTER — APPOINTMENT (RX ONLY)
Dept: URBAN - METROPOLITAN AREA CLINIC 39 | Facility: CLINIC | Age: 73
Setting detail: DERMATOLOGY
End: 2023-02-14

## 2023-02-14 VITALS
WEIGHT: 186 LBS | SYSTOLIC BLOOD PRESSURE: 133 MMHG | HEART RATE: 59 BPM | DIASTOLIC BLOOD PRESSURE: 68 MMHG | HEIGHT: 67 IN

## 2023-02-14 PROBLEM — C44.519 BASAL CELL CARCINOMA OF SKIN OF OTHER PART OF TRUNK: Status: ACTIVE | Noted: 2023-02-14

## 2023-02-14 PROCEDURE — 11602 EXC TR-EXT MAL+MARG 1.1-2 CM: CPT | Mod: 79

## 2023-02-14 PROCEDURE — ? EXCISION

## 2023-02-14 PROCEDURE — 12032 INTMD RPR S/A/T/EXT 2.6-7.5: CPT | Mod: 79

## 2023-02-14 NOTE — PROCEDURE: EXCISION
Biopsy Photograph Reviewed: Yes
Size Of Lesion In Cm: 0.5
X Size Of Lesion In Cm (Optional): 0
Size Of Margin In Cm: 0.4
Anesthesia Volume In Cc: 11.5
Was An Eye Clamp Used?: No
Eye Clamp Note Details: An eye clamp was used during the procedure.
Excision Method: Elliptical
Saucerization Depth: dermis and superficial adipose tissue
Repair Type: Intermediate
Suturegard Retention Suture: 2-0 Nylon
Retention Suture Bite Size: 3 mm
Length To Time In Minutes Device Was In Place: 10
Number Of Hemigard Strips Per Side: 1
Intermediate / Complex Repair - Final Wound Length In Cm: 3
Undermining Type: Entire Wound
Debridement Text: The wound edges were debrided prior to proceeding with the closure to facilitate wound healing.
Helical Rim Text: The closure involved the helical rim.
Vermilion Border Text: The closure involved the vermilion border.
Nostril Rim Text: The closure involved the nostril rim.
Retention Suture Text: Retention sutures were placed to support the closure and prevent dehiscence.
Suture Removal: 13 days
Lab: [id number]
Lab Facility: [id number]
Graft Donor Site Bandage (Optional-Leave Blank If You Don't Want In Note): Steri-strips and a pressure bandage were applied to the donor site.
Epidermal Closure Graft Donor Site (Optional): simple interrupted
Billing Type: Third-Party Bill
Excision Depth: adipose tissue
Scalpel Size: 15 blade
Anesthesia Type: 1% lidocaine with 1:100,000 epinephrine
Hemostasis: Electrocautery
Estimated Blood Loss (Cc): minimal
Detail Level: Detailed
Anesthesia Type: 1% lidocaine with epinephrine
Deep Sutures: 4-0 Vicryl
Epidermal Sutures: 4-0 Ethilon
Epidermal Closure: running
Wound Care: Vaseline
Dressing: pressure dressing with telfa
Suturegard Intro: Intraoperative tissue expansion was performed, utilizing the SUTUREGARD device, in order to reduce wound tension.
Suturegard Body: The suture ends were repeatedly re-tightened and re-clamped to achieve the desired tissue expansion.
Hemigard Intro: Due to skin fragility and wound tension, it was decided to use HEMIGARD adhesive retention suture devices to permit a linear closure. The skin was cleaned and dried for a 6cm distance away from the wound. Excessive hair, if present, was removed to allow for adhesion.
Hemigard Postcare Instructions: The HEMIGARD strips are to remain completely dry for at least 5-7 days.
Positioning (Leave Blank If You Do Not Want): The patient was placed in a comfortable position exposing the surgical site.
Pre-Excision Curettage Text (Leave Blank If You Do Not Want): Prior to drawing the surgical margin the visible lesion was removed with electrodesiccation and curettage to clearly define the lesion size.
Complex Repair Preamble Text (Leave Blank If You Do Not Want): Extensive wide undermining was performed.
Intermediate Repair Preamble Text (Leave Blank If You Do Not Want): Undermining was performed with blunt dissection.
Curvilinear Excision Additional Text (Leave Blank If You Do Not Want): The margin was drawn around the clinically apparent lesion.  A curvilinear shape was then drawn on the skin incorporating the lesion and margins.  Incisions were then made along these lines to the appropriate tissue plane and the lesion was extirpated.
Fusiform Excision Additional Text (Leave Blank If You Do Not Want): The margin was drawn around the clinically apparent lesion.  A fusiform shape was then drawn on the skin incorporating the lesion and margins.  Incisions were then made along these lines to the appropriate tissue plane and the lesion was extirpated.
Elliptical Excision Additional Text (Leave Blank If You Do Not Want): The margin was drawn around the clinically apparent lesion.  An elliptical shape was then drawn on the skin incorporating the lesion and margins.  Incisions were then made along these lines to the appropriate tissue plane and the lesion was extirpated.
Saucerization Excision Additional Text (Leave Blank If You Do Not Want): The margin was drawn around the clinically apparent lesion.  Incisions were then made along these lines, in a tangential fashion, to the appropriate tissue plane and the lesion was extirpated.
Slit Excision Additional Text (Leave Blank If You Do Not Want): A linear line was drawn on the skin overlying the lesion. An incision was made slowly until the lesion was visualized.  Once visualized, the lesion was removed with blunt dissection.
Excisional Biopsy Additional Text (Leave Blank If You Do Not Want): The margin was drawn around the clinically apparent lesion.  An elliptical shape was then drawn on the skin incorporating the lesion and margins.  Incisions were then made along these lines to the appropriate tissue plane and the lesion was extirpated.
Perilesional Excision Additional Text (Leave Blank If You Do Not Want): The margin was drawn around the clinically apparent lesion. Incisions were then made along these lines to the appropriate tissue plane and the lesion was extirpated.
Repair Performed By Another Provider Text (Leave Blank If You Do Not Want): After the tissue was excised the defect was repaired by another provider.
No Repair - Repaired With Adjacent Surgical Defect Text (Leave Blank If You Do Not Want): After the excision the defect was repaired concurrently with another surgical defect which was in close approximation.
Adjacent Tissue Transfer Text: The defect edges were debeveled with a #15 scalpel blade. Given the location of the defect and the proximity to free margins an adjacent tissue transfer was deemed most appropriate. Using a sterile surgical marker, an appropriate flap was drawn incorporating the defect and placing the expected incisions within the relaxed skin tension lines where possible. The area thus outlined was incised deep to adipose tissue with a #15 scalpel blade. The skin margins were undermined to an appropriate distance in all directions utilizing iris scissors.
Advancement Flap (Single) Text: The defect edges were debeveled with a #15 scalpel blade.  Given the location of the defect and the proximity to free margins a single advancement flap was deemed most appropriate.  Using a sterile surgical marker, an appropriate advancement flap was drawn incorporating the defect and placing the expected incisions within the relaxed skin tension lines where possible.    The area thus outlined was incised deep to adipose tissue with a #15 scalpel blade.  The skin margins were undermined to an appropriate distance in all directions utilizing iris scissors.
Advancement Flap (Double) Text: The defect edges were debeveled with a #15 scalpel blade.  Given the location of the defect and the proximity to free margins a double advancement flap was deemed most appropriate.  Using a sterile surgical marker, the appropriate advancement flaps were drawn incorporating the defect and placing the expected incisions within the relaxed skin tension lines where possible.    The area thus outlined was incised deep to adipose tissue with a #15 scalpel blade.  The skin margins were undermined to an appropriate distance in all directions utilizing iris scissors.
Burow's Advancement Flap Text: The defect edges were debeveled with a #15 scalpel blade.  Given the location of the defect and the proximity to free margins a Burow's advancement flap was deemed most appropriate.  Using a sterile surgical marker, the appropriate advancement flap was drawn incorporating the defect and placing the expected incisions within the relaxed skin tension lines where possible.    The area thus outlined was incised deep to adipose tissue with a #15 scalpel blade.  The skin margins were undermined to an appropriate distance in all directions utilizing iris scissors.
Chonodrocutaneous Helical Advancement Flap Text: The defect edges were debeveled with a #15 scalpel blade. Given the location of the defect and the proximity to free margins a chondrocutaneous helical advancement flap was deemed most appropriate. Using a sterile surgical marker, the appropriate advancement flap was drawn incorporating the defect and placing the expected incisions within the relaxed skin tension lines where possible. The area thus outlined was incised deep to adipose tissue with a #15 scalpel blade. The skin margins were undermined to an appropriate distance in all directions utilizing iris scissors. Following this, the designed flap was advanced into the primary defect and sutured into place.
Crescentic Advancement Flap Text: The defect edges were debeveled with a #15 scalpel blade.  Given the location of the defect and the proximity to free margins a crescentic advancement flap was deemed most appropriate.  Using a sterile surgical marker, the appropriate advancement flap was drawn incorporating the defect and placing the expected incisions within the relaxed skin tension lines where possible.    The area thus outlined was incised deep to adipose tissue with a #15 scalpel blade.  The skin margins were undermined to an appropriate distance in all directions utilizing iris scissors.
A-T Advancement Flap Text: The defect edges were debeveled with a #15 scalpel blade.  Given the location of the defect, shape of the defect and the proximity to free margins an A-T advancement flap was deemed most appropriate.  Using a sterile surgical marker, an appropriate advancement flap was drawn incorporating the defect and placing the expected incisions within the relaxed skin tension lines where possible.    The area thus outlined was incised deep to adipose tissue with a #15 scalpel blade.  The skin margins were undermined to an appropriate distance in all directions utilizing iris scissors.
O-T Advancement Flap Text: The defect edges were debeveled with a #15 scalpel blade.  Given the location of the defect, shape of the defect and the proximity to free margins an O-T advancement flap was deemed most appropriate.  Using a sterile surgical marker, an appropriate advancement flap was drawn incorporating the defect and placing the expected incisions within the relaxed skin tension lines where possible.    The area thus outlined was incised deep to adipose tissue with a #15 scalpel blade.  The skin margins were undermined to an appropriate distance in all directions utilizing iris scissors.
O-L Flap Text: The defect edges were debeveled with a #15 scalpel blade.  Given the location of the defect, shape of the defect and the proximity to free margins an O-L flap was deemed most appropriate.  Using a sterile surgical marker, an appropriate advancement flap was drawn incorporating the defect and placing the expected incisions within the relaxed skin tension lines where possible.    The area thus outlined was incised deep to adipose tissue with a #15 scalpel blade.  The skin margins were undermined to an appropriate distance in all directions utilizing iris scissors.
O-Z Flap Text: The defect edges were debeveled with a #15 scalpel blade. Given the location of the defect, shape of the defect and the proximity to free margins an O-Z flap was deemed most appropriate. Using a sterile surgical marker, an appropriate transposition flap was drawn incorporating the defect and placing the expected incisions within the relaxed skin tension lines where possible. The area thus outlined was incised deep to adipose tissue with a #15 scalpel blade. The skin margins were undermined to an appropriate distance in all directions utilizing iris scissors. Following this, the designed flap was placed into the primary defect and sutured into place.
Double O-Z Flap Text: The defect edges were debeveled with a #15 scalpel blade. Given the location of the defect, shape of the defect and the proximity to free margins a Double O-Z flap was deemed most appropriate. Using a sterile surgical marker, an appropriate transposition flap was drawn incorporating the defect and placing the expected incisions within the relaxed skin tension lines where possible. The area thus outlined was incised deep to adipose tissue with a #15 scalpel blade. The skin margins were undermined to an appropriate distance in all directions utilizing iris scissors. Following this, the designed flap was placed into the primary defect and sutured into place.
V-Y Flap Text: The defect edges were debeveled with a #15 scalpel blade.  Given the location of the defect, shape of the defect and the proximity to free margins a V-Y flap was deemed most appropriate.  Using a sterile surgical marker, an appropriate advancement flap was drawn incorporating the defect and placing the expected incisions within the relaxed skin tension lines where possible.    The area thus outlined was incised deep to adipose tissue with a #15 scalpel blade.  The skin margins were undermined to an appropriate distance in all directions utilizing iris scissors.
Advancement-Rotation Flap Text: The defect edges were debeveled with a #15 scalpel blade.  Given the location of the defect, shape of the defect and the proximity to free margins an advancement-rotation flap was deemed most appropriate.  Using a sterile surgical marker, an appropriate flap was drawn incorporating the defect and placing the expected incisions within the relaxed skin tension lines where possible. The area thus outlined was incised deep to adipose tissue with a #15 scalpel blade.  The skin margins were undermined to an appropriate distance in all directions utilizing iris scissors.
Mercedes Flap Text: The defect edges were debeveled with a #15 scalpel blade. Given the location of the defect, shape of the defect and the proximity to free margins a Mercedes flap was deemed most appropriate. Using a sterile surgical marker, an appropriate advancement flap was drawn incorporating the defect and placing the expected incisions within the relaxed skin tension lines where possible. The area thus outlined was incised deep to adipose tissue with a #15 scalpel blade. The skin margins were undermined to an appropriate distance in all directions utilizing iris scissors. Following this, the designed flap was advanced into the primary defect and sutured into place.
Modified Advancement Flap Text: The defect edges were debeveled with a #15 scalpel blade.  Given the location of the defect, shape of the defect and the proximity to free margins a modified advancement flap was deemed most appropriate.  Using a sterile surgical marker, an appropriate advancement flap was drawn incorporating the defect and placing the expected incisions within the relaxed skin tension lines where possible.    The area thus outlined was incised deep to adipose tissue with a #15 scalpel blade.  The skin margins were undermined to an appropriate distance in all directions utilizing iris scissors.
Mucosal Advancement Flap Text: Given the location of the defect, shape of the defect and the proximity to free margins a mucosal advancement flap was deemed most appropriate. Incisions were made with a 15 blade scalpel in the appropriate fashion along the cutaneous vermillion border and the mucosal lip. The remaining actinically damaged mucosal tissue was excised.  The mucosal advancement flap was then elevated to the gingival sulcus with care taken to preserve the neurovascular structures and advanced into the primary defect. Care was taken to ensure that precise realignment of the vermillion border was achieved.
Peng Advancement Flap Text: The defect edges were debeveled with a #15 scalpel blade. Given the location of the defect, shape of the defect and the proximity to free margins a Peng advancement flap was deemed most appropriate. Using a sterile surgical marker, an appropriate advancement flap was drawn incorporating the defect and placing the expected incisions within the relaxed skin tension lines where possible. The area thus outlined was incised deep to adipose tissue with a #15 scalpel blade. The skin margins were undermined to an appropriate distance in all directions utilizing iris scissors. Following this, the designed flap was advanced into the primary defect and sutured into place.
Hatchet Flap Text: The defect edges were debeveled with a #15 scalpel blade.  Given the location of the defect, shape of the defect and the proximity to free margins a hatchet flap was deemed most appropriate.  Using a sterile surgical marker, an appropriate hatchet flap was drawn incorporating the defect and placing the expected incisions within the relaxed skin tension lines where possible.    The area thus outlined was incised deep to adipose tissue with a #15 scalpel blade.  The skin margins were undermined to an appropriate distance in all directions utilizing iris scissors.
Rotation Flap Text: The defect edges were debeveled with a #15 scalpel blade.  Given the location of the defect, shape of the defect and the proximity to free margins a rotation flap was deemed most appropriate.  Using a sterile surgical marker, an appropriate rotation flap was drawn incorporating the defect and placing the expected incisions within the relaxed skin tension lines where possible.    The area thus outlined was incised deep to adipose tissue with a #15 scalpel blade.  The skin margins were undermined to an appropriate distance in all directions utilizing iris scissors.
Spiral Flap Text: The defect edges were debeveled with a #15 scalpel blade.  Given the location of the defect, shape of the defect and the proximity to free margins a spiral flap was deemed most appropriate.  Using a sterile surgical marker, an appropriate rotation flap was drawn incorporating the defect and placing the expected incisions within the relaxed skin tension lines where possible. The area thus outlined was incised deep to adipose tissue with a #15 scalpel blade.  The skin margins were undermined to an appropriate distance in all directions utilizing iris scissors.
Staged Advancement Flap Text: The defect edges were debeveled with a #15 scalpel blade. Given the location of the defect, shape of the defect and the proximity to free margins a staged advancement flap was deemed most appropriate. Using a sterile surgical marker, an appropriate advancement flap was drawn incorporating the defect and placing the expected incisions within the relaxed skin tension lines where possible. The area thus outlined was incised deep to adipose tissue with a #15 scalpel blade. The skin margins were undermined to an appropriate distance in all directions utilizing iris scissors. Following this, the designed flap was placed into the primary defect and sutured into place.
Star Wedge Flap Text: The defect edges were debeveled with a #15 scalpel blade.  Given the location of the defect, shape of the defect and the proximity to free margins a star wedge flap was deemed most appropriate.  Using a sterile surgical marker, an appropriate rotation flap was drawn incorporating the defect and placing the expected incisions within the relaxed skin tension lines where possible. The area thus outlined was incised deep to adipose tissue with a #15 scalpel blade.  The skin margins were undermined to an appropriate distance in all directions utilizing iris scissors.
Transposition Flap Text: The defect edges were debeveled with a #15 scalpel blade.  Given the location of the defect and the proximity to free margins a transposition flap was deemed most appropriate.  Using a sterile surgical marker, an appropriate transposition flap was drawn incorporating the defect.    The area thus outlined was incised deep to adipose tissue with a #15 scalpel blade.  The skin margins were undermined to an appropriate distance in all directions utilizing iris scissors.
Muscle Hinge Flap Text: The defect edges were debeveled with a #15 scalpel blade.  Given the size, depth and location of the defect and the proximity to free margins a muscle hinge flap was deemed most appropriate.  Using a sterile surgical marker, an appropriate hinge flap was drawn incorporating the defect. The area thus outlined was incised with a #15 scalpel blade.  The skin margins were undermined to an appropriate distance in all directions utilizing iris scissors.
Mustarde Flap Text: The defect edges were debeveled with a #15 scalpel blade.  Given the size, depth and location of the defect and the proximity to free margins a Mustarde flap was deemed most appropriate. Using a sterile surgical marker, an appropriate flap was drawn incorporating the defect. The area thus outlined was incised with a #15 scalpel blade. The skin margins were undermined to an appropriate distance in all directions utilizing iris scissors. Following this, the designed flap was placed in the primary defect and sutured into place.
Nasal Turnover Hinge Flap Text: The defect edges were debeveled with a #15 scalpel blade.  Given the size, depth, location of the defect and the defect being full thickness a nasal turnover hinge flap was deemed most appropriate. Using a sterile surgical marker, an appropriate hinge flap was drawn incorporating the defect. The area thus outlined was incised with a #15 scalpel blade. The flap was designed to recreate the nasal mucosal lining and the alar rim. The skin margins were undermined to an appropriate distance in all directions utilizing iris scissors. Following this, the designed flap was placed into the primary defect and sutured into place
Nasalis-Muscle-Based Myocutaneous Island Pedicle Flap Text: Using a #15 blade, an incision was made around the donor flap to the level of the nasalis muscle. Wide lateral undermining was then performed in both the subcutaneous plane above the nasalis muscle, and in a submuscular plane just above periosteum. This allowed the formation of a free nasalis muscle axial pedicle (based on the angular artery) which was still attached to the actual cutaneous flap, increasing its mobility and vascular viability. Hemostasis was obtained with pinpoint electrocoagulation. The flap was mobilized into position and the pivotal anchor points positioned and stabilized with buried interrupted sutures. Subcutaneous and dermal tissues were closed in a multilayered fashion with sutures. Tissue redundancies were excised, and the epidermal edges were apposed without significant tension and sutured with sutures.
Orbicularis Oris Muscle Flap Text: The defect edges were debeveled with a #15 scalpel blade.  Given that the defect affected the competency of the oral sphincter an orbicularis oris muscle flap was deemed most appropriate to restore this competency and normal muscle function.  Using a sterile surgical marker, an appropriate flap was drawn incorporating the defect. The area thus outlined was incised with a #15 scalpel blade. Following this, the designed flap was placed into the primary defect and sutured into place.
Melolabial Transposition Flap Text: The defect edges were debeveled with a #15 scalpel blade.  Given the location of the defect and the proximity to free margins a melolabial flap was deemed most appropriate.  Using a sterile surgical marker, an appropriate melolabial transposition flap was drawn incorporating the defect.    The area thus outlined was incised deep to adipose tissue with a #15 scalpel blade.  The skin margins were undermined to an appropriate distance in all directions utilizing iris scissors.
Rhombic Flap Text: The defect edges were debeveled with a #15 scalpel blade.  Given the location of the defect and the proximity to free margins a rhombic flap was deemed most appropriate.  Using a sterile surgical marker, an appropriate rhombic flap was drawn incorporating the defect.    The area thus outlined was incised deep to adipose tissue with a #15 scalpel blade.  The skin margins were undermined to an appropriate distance in all directions utilizing iris scissors.
Rhomboid Transposition Flap Text: The defect edges were debeveled with a #15 scalpel blade. Given the location of the defect and the proximity to free margins a rhomboid transposition flap was deemed most appropriate. Using a sterile surgical marker, an appropriate rhomboid flap was drawn incorporating the defect. The area thus outlined was incised deep to adipose tissue with a #15 scalpel blade. The skin margins were undermined to an appropriate distance in all directions utilizing iris scissors. Following this, the designed flap was placed into the primary defect and sutured into place.
Bi-Rhombic Flap Text: The defect edges were debeveled with a #15 scalpel blade.  Given the location of the defect and the proximity to free margins a bi-rhombic flap was deemed most appropriate.  Using a sterile surgical marker, an appropriate rhombic flap was drawn incorporating the defect. The area thus outlined was incised deep to adipose tissue with a #15 scalpel blade.  The skin margins were undermined to an appropriate distance in all directions utilizing iris scissors.
Helical Rim Advancement Flap Text: The defect edges were debeveled with a #15 blade scalpel.  Given the location of the defect and the proximity to free margins (helical rim) a double helical rim advancement flap was deemed most appropriate.  Using a sterile surgical marker, the appropriate advancement flaps were drawn incorporating the defect and placing the expected incisions between the helical rim and antihelix where possible.  The area thus outlined was incised through and through with a #15 scalpel blade.  With a skin hook and iris scissors, the flaps were gently and sharply undermined and freed up.
Bilateral Helical Rim Advancement Flap Text: The defect edges were debeveled with a #15 blade scalpel.  Given the location of the defect and the proximity to free margins (helical rim) a bilateral helical rim advancement flap was deemed most appropriate.  Using a sterile surgical marker, the appropriate advancement flaps were drawn incorporating the defect and placing the expected incisions between the helical rim and antihelix where possible.  The area thus outlined was incised through and through with a #15 scalpel blade.  With a skin hook and iris scissors, the flaps were gently and sharply undermined and freed up.
Ear Star Wedge Flap Text: The defect edges were debeveled with a #15 blade scalpel.  Given the location of the defect and the proximity to free margins (helical rim) an ear star wedge flap was deemed most appropriate.  Using a sterile surgical marker, the appropriate flap was drawn incorporating the defect and placing the expected incisions between the helical rim and antihelix where possible.  The area thus outlined was incised through and through with a #15 scalpel blade.
Banner Transposition Flap Text: The defect edges were debeveled with a #15 scalpel blade. Given the location of the defect and the proximity to free margins a Banner transposition flap was deemed most appropriate. Using a sterile surgical marker, an appropriate flap was drawn around the defect. The area thus outlined was incised deep to adipose tissue with a #15 scalpel blade. The skin margins were undermined to an appropriate distance in all directions utilizing iris scissors. Following this, the designed flap was placed in the primary defect and sutured into place.
Bilobed Flap Text: The defect edges were debeveled with a #15 scalpel blade.  Given the location of the defect and the proximity to free margins a bilobe flap was deemed most appropriate.  Using a sterile surgical marker, an appropriate bilobe flap drawn around the defect.    The area thus outlined was incised deep to adipose tissue with a #15 scalpel blade.  The skin margins were undermined to an appropriate distance in all directions utilizing iris scissors.
Bilobed Transposition Flap Text: The defect edges were debeveled with a #15 scalpel blade.  Given the location of the defect and the proximity to free margins a bilobed transposition flap was deemed most appropriate.  Using a sterile surgical marker, an appropriate bilobe flap drawn around the defect.    The area thus outlined was incised deep to adipose tissue with a #15 scalpel blade.  The skin margins were undermined to an appropriate distance in all directions utilizing iris scissors.
Trilobed Flap Text: The defect edges were debeveled with a #15 scalpel blade.  Given the location of the defect and the proximity to free margins a trilobed flap was deemed most appropriate.  Using a sterile surgical marker, an appropriate trilobed flap drawn around the defect.    The area thus outlined was incised deep to adipose tissue with a #15 scalpel blade.  The skin margins were undermined to an appropriate distance in all directions utilizing iris scissors.
Dorsal Nasal Flap Text: The defect edges were debeveled with a #15 scalpel blade.  Given the location of the defect and the proximity to free margins a dorsal nasal flap was deemed most appropriate.  Using a sterile surgical marker, an appropriate dorsal nasal flap was drawn around the defect.    The area thus outlined was incised deep to adipose tissue with a #15 scalpel blade.  The skin margins were undermined to an appropriate distance in all directions utilizing iris scissors.
Island Pedicle Flap Text: The defect edges were debeveled with a #15 scalpel blade.  Given the location of the defect, shape of the defect and the proximity to free margins an island pedicle advancement flap was deemed most appropriate.  Using a sterile surgical marker, an appropriate advancement flap was drawn incorporating the defect, outlining the appropriate donor tissue and placing the expected incisions within the relaxed skin tension lines where possible.    The area thus outlined was incised deep to adipose tissue with a #15 scalpel blade.  The skin margins were undermined to an appropriate distance in all directions around the primary defect and laterally outward around the island pedicle utilizing iris scissors.  There was minimal undermining beneath the pedicle flap.
Island Pedicle Flap With Canthal Suspension Text: The defect edges were debeveled with a #15 scalpel blade.  Given the location of the defect, shape of the defect and the proximity to free margins an island pedicle advancement flap was deemed most appropriate.  Using a sterile surgical marker, an appropriate advancement flap was drawn incorporating the defect, outlining the appropriate donor tissue and placing the expected incisions within the relaxed skin tension lines where possible. The area thus outlined was incised deep to adipose tissue with a #15 scalpel blade.  The skin margins were undermined to an appropriate distance in all directions around the primary defect and laterally outward around the island pedicle utilizing iris scissors.  There was minimal undermining beneath the pedicle flap. A suspension suture was placed in the canthal tendon to prevent tension and prevent ectropion.
Alar Island Pedicle Flap Text: The defect edges were debeveled with a #15 scalpel blade.  Given the location of the defect, shape of the defect and the proximity to the alar rim an island pedicle advancement flap was deemed most appropriate.  Using a sterile surgical marker, an appropriate advancement flap was drawn incorporating the defect, outlining the appropriate donor tissue and placing the expected incisions within the nasal ala running parallel to the alar rim. The area thus outlined was incised with a #15 scalpel blade.  The skin margins were undermined minimally to an appropriate distance in all directions around the primary defect and laterally outward around the island pedicle utilizing iris scissors.  There was minimal undermining beneath the pedicle flap.
Double Island Pedicle Flap Text: The defect edges were debeveled with a #15 scalpel blade.  Given the location of the defect, shape of the defect and the proximity to free margins a double island pedicle advancement flap was deemed most appropriate.  Using a sterile surgical marker, an appropriate advancement flap was drawn incorporating the defect, outlining the appropriate donor tissue and placing the expected incisions within the relaxed skin tension lines where possible.    The area thus outlined was incised deep to adipose tissue with a #15 scalpel blade.  The skin margins were undermined to an appropriate distance in all directions around the primary defect and laterally outward around the island pedicle utilizing iris scissors.  There was minimal undermining beneath the pedicle flap.
Island Pedicle Flap-Requiring Vessel Identification Text: The defect edges were debeveled with a #15 scalpel blade.  Given the location of the defect, shape of the defect and the proximity to free margins an island pedicle advancement flap was deemed most appropriate.  Using a sterile surgical marker, an appropriate advancement flap was drawn, based on the axial vessel mentioned above, incorporating the defect, outlining the appropriate donor tissue and placing the expected incisions within the relaxed skin tension lines where possible.    The area thus outlined was incised deep to adipose tissue with a #15 scalpel blade.  The skin margins were undermined to an appropriate distance in all directions around the primary defect and laterally outward around the island pedicle utilizing iris scissors.  There was minimal undermining beneath the pedicle flap.
Keystone Flap Text: The defect edges were debeveled with a #15 scalpel blade.  Given the location of the defect, shape of the defect a keystone flap was deemed most appropriate.  Using a sterile surgical marker, an appropriate keystone flap was drawn incorporating the defect, outlining the appropriate donor tissue and placing the expected incisions within the relaxed skin tension lines where possible. The area thus outlined was incised deep to adipose tissue with a #15 scalpel blade.  The skin margins were undermined to an appropriate distance in all directions around the primary defect and laterally outward around the flap utilizing iris scissors.
O-T Plasty Text: The defect edges were debeveled with a #15 scalpel blade.  Given the location of the defect, shape of the defect and the proximity to free margins an O-T plasty was deemed most appropriate.  Using a sterile surgical marker, an appropriate O-T plasty was drawn incorporating the defect and placing the expected incisions within the relaxed skin tension lines where possible.    The area thus outlined was incised deep to adipose tissue with a #15 scalpel blade.  The skin margins were undermined to an appropriate distance in all directions utilizing iris scissors.
O-Z Plasty Text: The defect edges were debeveled with a #15 scalpel blade.  Given the location of the defect, shape of the defect and the proximity to free margins an O-Z plasty (double transposition flap) was deemed most appropriate.  Using a sterile surgical marker, the appropriate transposition flaps were drawn incorporating the defect and placing the expected incisions within the relaxed skin tension lines where possible.    The area thus outlined was incised deep to adipose tissue with a #15 scalpel blade.  The skin margins were undermined to an appropriate distance in all directions utilizing iris scissors.  Hemostasis was achieved with electrocautery.  The flaps were then transposed into place, one clockwise and the other counterclockwise, and anchored with interrupted buried subcutaneous sutures.
Double O-Z Plasty Text: The defect edges were debeveled with a #15 scalpel blade. Given the location of the defect, shape of the defect and the proximity to free margins a Double O-Z plasty (double transposition flap) was deemed most appropriate. Using a sterile surgical marker, the appropriate transposition flaps were drawn incorporating the defect and placing the expected incisions within the relaxed skin tension lines where possible. The area thus outlined was incised deep to adipose tissue with a #15 scalpel blade. The skin margins were undermined to an appropriate distance in all directions utilizing iris scissors.  Hemostasis was achieved with electrocautery.  The flaps were then transposed into place, one clockwise and the other counterclockwise, and anchored with interrupted buried subcutaneous sutures.
V-Y Plasty Text: The defect edges were debeveled with a #15 scalpel blade.  Given the location of the defect, shape of the defect and the proximity to free margins an V-Y advancement flap was deemed most appropriate.  Using a sterile surgical marker, an appropriate advancement flap was drawn incorporating the defect and placing the expected incisions within the relaxed skin tension lines where possible.    The area thus outlined was incised deep to adipose tissue with a #15 scalpel blade.  The skin margins were undermined to an appropriate distance in all directions utilizing iris scissors.
H Plasty Text: Given the location of the defect, shape of the defect and the proximity to free margins a H-plasty was deemed most appropriate for repair.  Using a sterile surgical marker, the appropriate advancement arms of the H-plasty were drawn incorporating the defect and placing the expected incisions within the relaxed skin tension lines where possible. The area thus outlined was incised deep to adipose tissue with a #15 scalpel blade. The skin margins were undermined to an appropriate distance in all directions utilizing iris scissors.  The opposing advancement arms were then advanced into place in opposite direction and anchored with interrupted buried subcutaneous sutures.
W Plasty Text: The lesion was extirpated to the level of the fat with a #15 scalpel blade.  Given the location of the defect, shape of the defect and the proximity to free margins a W-plasty was deemed most appropriate for repair.  Using a sterile surgical marker, the appropriate transposition arms of the W-plasty were drawn incorporating the defect and placing the expected incisions within the relaxed skin tension lines where possible.    The area thus outlined was incised deep to adipose tissue with a #15 scalpel blade.  The skin margins were undermined to an appropriate distance in all directions utilizing iris scissors.  The opposing transposition arms were then transposed into place in opposite direction and anchored with interrupted buried subcutaneous sutures.
Z Plasty Text: The lesion was extirpated to the level of the fat with a #15 scalpel blade.  Given the location of the defect, shape of the defect and the proximity to free margins a Z-plasty was deemed most appropriate for repair.  Using a sterile surgical marker, the appropriate transposition arms of the Z-plasty were drawn incorporating the defect and placing the expected incisions within the relaxed skin tension lines where possible.    The area thus outlined was incised deep to adipose tissue with a #15 scalpel blade.  The skin margins were undermined to an appropriate distance in all directions utilizing iris scissors.  The opposing transposition arms were then transposed into place in opposite direction and anchored with interrupted buried subcutaneous sutures.
Zygomaticofacial Flap Text: Given the location of the defect, shape of the defect and the proximity to free margins a zygomaticofacial flap was deemed most appropriate for repair. Using a sterile surgical marker, the appropriate flap was drawn incorporating the defect and placing the expected incisions within the relaxed skin tension lines where possible. The area thus outlined was incised deep to adipose tissue with a #15 scalpel blade with preservation of a vascular pedicle.  The skin margins were undermined to an appropriate distance in all directions utilizing iris scissors.  The flap was then placed into the defect and anchored with interrupted buried subcutaneous sutures.
Cheek Interpolation Flap Text: A decision was made to reconstruct the defect utilizing an interpolation axial flap and a staged reconstruction.  A telfa template was made of the defect.  This telfa template was then used to outline the Cheek Interpolation flap.  The donor area for the pedicle flap was then injected with anesthesia.  The flap was excised through the skin and subcutaneous tissue down to the layer of the underlying musculature.  The interpolation flap was carefully excised within this deep plane to maintain its blood supply.  The edges of the donor site were undermined.   The donor site was closed in a primary fashion.  The pedicle was then rotated into position and sutured.  Once the tube was sutured into place, adequate blood supply was confirmed with blanching and refill.  The pedicle was then wrapped with xeroform gauze and dressed appropriately with a telfa and gauze bandage to ensure continued blood supply and protect the attached pedicle.
Cheek-To-Nose Interpolation Flap Text: A decision was made to reconstruct the defect utilizing an interpolation axial flap and a staged reconstruction.  A telfa template was made of the defect.  This telfa template was then used to outline the Cheek-To-Nose Interpolation flap.  The donor area for the pedicle flap was then injected with anesthesia.  The flap was excised through the skin and subcutaneous tissue down to the layer of the underlying musculature.  The interpolation flap was carefully excised within this deep plane to maintain its blood supply.  The edges of the donor site were undermined.   The donor site was closed in a primary fashion.  The pedicle was then rotated into position and sutured.  Once the tube was sutured into place, adequate blood supply was confirmed with blanching and refill.  The pedicle was then wrapped with xeroform gauze and dressed appropriately with a telfa and gauze bandage to ensure continued blood supply and protect the attached pedicle.
Interpolation Flap Text: A decision was made to reconstruct the defect utilizing an interpolation axial flap and a staged reconstruction.  A telfa template was made of the defect.  This telfa template was then used to outline the interpolation flap.  The donor area for the pedicle flap was then injected with anesthesia.  The flap was excised through the skin and subcutaneous tissue down to the layer of the underlying musculature.  The interpolation flap was carefully excised within this deep plane to maintain its blood supply.  The edges of the donor site were undermined.   The donor site was closed in a primary fashion.  The pedicle was then rotated into position and sutured.  Once the tube was sutured into place, adequate blood supply was confirmed with blanching and refill.  The pedicle was then wrapped with xeroform gauze and dressed appropriately with a telfa and gauze bandage to ensure continued blood supply and protect the attached pedicle.
Melolabial Interpolation Flap Text: A decision was made to reconstruct the defect utilizing an interpolation axial flap and a staged reconstruction.  A telfa template was made of the defect.  This telfa template was then used to outline the melolabial interpolation flap.  The donor area for the pedicle flap was then injected with anesthesia.  The flap was excised through the skin and subcutaneous tissue down to the layer of the underlying musculature.  The pedicle flap was carefully excised within this deep plane to maintain its blood supply.  The edges of the donor site were undermined.   The donor site was closed in a primary fashion.  The pedicle was then rotated into position and sutured.  Once the tube was sutured into place, adequate blood supply was confirmed with blanching and refill.  The pedicle was then wrapped with xeroform gauze and dressed appropriately with a telfa and gauze bandage to ensure continued blood supply and protect the attached pedicle.
Mastoid Interpolation Flap Text: A decision was made to reconstruct the defect utilizing an interpolation axial flap and a staged reconstruction.  A telfa template was made of the defect.  This telfa template was then used to outline the mastoid interpolation flap.  The donor area for the pedicle flap was then injected with anesthesia.  The flap was excised through the skin and subcutaneous tissue down to the layer of the underlying musculature.  The pedicle flap was carefully excised within this deep plane to maintain its blood supply.  The edges of the donor site were undermined.   The donor site was closed in a primary fashion.  The pedicle was then rotated into position and sutured.  Once the tube was sutured into place, adequate blood supply was confirmed with blanching and refill.  The pedicle was then wrapped with xeroform gauze and dressed appropriately with a telfa and gauze bandage to ensure continued blood supply and protect the attached pedicle.
Posterior Auricular Interpolation Flap Text: A decision was made to reconstruct the defect utilizing an interpolation axial flap and a staged reconstruction.  A telfa template was made of the defect.  This telfa template was then used to outline the posterior auricular interpolation flap.  The donor area for the pedicle flap was then injected with anesthesia.  The flap was excised through the skin and subcutaneous tissue down to the layer of the underlying musculature.  The pedicle flap was carefully excised within this deep plane to maintain its blood supply.  The edges of the donor site were undermined.   The donor site was closed in a primary fashion.  The pedicle was then rotated into position and sutured.  Once the tube was sutured into place, adequate blood supply was confirmed with blanching and refill.  The pedicle was then wrapped with xeroform gauze and dressed appropriately with a telfa and gauze bandage to ensure continued blood supply and protect the attached pedicle.
Paramedian Forehead Flap Text: A decision was made to reconstruct the defect utilizing an interpolation axial flap and a staged reconstruction.  A telfa template was made of the defect.  This telfa template was then used to outline the paramedian forehead pedicle flap.  The donor area for the pedicle flap was then injected with anesthesia.  The flap was excised through the skin and subcutaneous tissue down to the layer of the underlying musculature.  The pedicle flap was carefully excised within this deep plane to maintain its blood supply.  The edges of the donor site were undermined.   The donor site was closed in a primary fashion.  The pedicle was then rotated into position and sutured.  Once the tube was sutured into place, adequate blood supply was confirmed with blanching and refill.  The pedicle was then wrapped with xeroform gauze and dressed appropriately with a telfa and gauze bandage to ensure continued blood supply and protect the attached pedicle.
Abbe Flap (Upper To Lower Lip) Text: The defect of the lower lip was assessed and measured.  Given the location and size of the defect, an Abbe flap was deemed most appropriate. Using a sterile surgical marker, an appropriate Abbe flap was measured and drawn on the upper lip. Local anesthesia was then infiltrated.  A scalpel was then used to incise the upper lip through and through the skin, vermilion, muscle and mucosa, leaving the flap pedicled on the opposite side.  The flap was then rotated and transferred to the lower lip defect.  The flap was then sutured into place with a three layer technique, closing the orbicularis oris muscle layer with subcutaneous buried sutures, followed by a mucosal layer and an epidermal layer.
Abbe Flap (Lower To Upper Lip) Text: The defect of the upper lip was assessed and measured.  Given the location and size of the defect, an Abbe flap was deemed most appropriate. Using a sterile surgical marker, an appropriate Abbe flap was measured and drawn on the lower lip. Local anesthesia was then infiltrated. A scalpel was then used to incise the upper lip through and through the skin, vermilion, muscle and mucosa, leaving the flap pedicled on the opposite side.  The flap was then rotated and transferred to the lower lip defect.  The flap was then sutured into place with a three layer technique, closing the orbicularis oris muscle layer with subcutaneous buried sutures, followed by a mucosal layer and an epidermal layer.
Estlander Flap (Upper To Lower Lip) Text: The defect of the lower lip was assessed and measured.  Given the location and size of the defect, an Estlander flap was deemed most appropriate. Using a sterile surgical marker, an appropriate Estlander flap was measured and drawn on the upper lip. Local anesthesia was then infiltrated. A scalpel was then used to incise the lateral aspect of the flap, through skin, muscle and mucosa, leaving the flap pedicled medially.  The flap was then rotated and positioned to fill the lower lip defect.  The flap was then sutured into place with a three layer technique, closing the orbicularis oris muscle layer with subcutaneous buried sutures, followed by a mucosal layer and an epidermal layer.
Estlander Flap (Lower To Upper Lip) Text: The defect of the lower lip was assessed and measured.  Given the location and size of the defect, an Estlander flap was deemed most appropriate. Using a sterile surgical marker, an appropriate Estlander flap was measured and drawn on the upper lip. Local anesthesia was then infiltrated. A scalpel was then used to incise the lateral aspect of the flap, through skin, muscle and mucosa, leaving the flap pedicled medially.  The flap was then rotated and positioned to fill the lower lip defect.  The flap was then sutured into place with a three layer technique, closing the orbicularis oris muscle layer with subcutaneous buried sutures, followed by a mucosal layer and an epidermal layer.
Lip Wedge Excision Repair Text: Given the location of the defect and the proximity to free margins a full thickness wedge repair was deemed most appropriate.  Using a sterile surgical marker, the appropriate repair was drawn incorporating the defect and placing the expected incisions perpendicular to the vermillion border.  The vermillion border was also meticulously outlined to ensure appropriate reapproximation during the repair.  The area thus outlined was incised through and through with a #15 scalpel blade.  The muscularis and dermis were reaproximated with deep sutures following hemostasis. Care was taken to realign the vermillion border before proceeding with the superficial closure.  Once the vermillion was realigned the superfical and mucosal closure was finished.
Ftsg Text: The defect edges were debeveled with a #15 scalpel blade.  Given the location of the defect, shape of the defect and the proximity to free margins a full thickness skin graft was deemed most appropriate.  Using a sterile surgical marker, the primary defect shape was transferred to the donor site. The area thus outlined was incised deep to adipose tissue with a #15 scalpel blade.  The harvested graft was then trimmed of adipose tissue until only dermis and epidermis was left.  The skin margins of the secondary defect were undermined to an appropriate distance in all directions utilizing iris scissors.  The secondary defect was closed with interrupted buried subcutaneous sutures.  The skin edges were then re-apposed with running  sutures.  The skin graft was then placed in the primary defect and oriented appropriately.
Split-Thickness Skin Graft Text: The defect edges were debeveled with a #15 scalpel blade.  Given the location of the defect, shape of the defect and the proximity to free margins a split thickness skin graft was deemed most appropriate.  Using a sterile surgical marker, the primary defect shape was transferred to the donor site. The split thickness graft was then harvested.  The skin graft was then placed in the primary defect and oriented appropriately.
Burow's Graft Text: The defect edges were debeveled with a #15 scalpel blade. Given the location of the defect, shape of the defect, the proximity to free margins and the presence of a standing cone deformity a Burow's skin graft was deemed most appropriate. The standing cone was removed and this tissue was then trimmed to the shape of the primary defect. The adipose tissue was also removed until only dermis and epidermis were left.  The skin margins of the secondary defect were undermined to an appropriate distance in all directions utilizing iris scissors.  The secondary defect was closed with interrupted buried subcutaneous sutures.  The skin edges were then re-apposed with running  sutures.  The skin graft was then placed in the primary defect and oriented appropriately.
Cartilage Graft Text: The defect edges were debeveled with a #15 scalpel blade.  Given the location of the defect, shape of the defect, the fact the defect involved a full thickness cartilage defect a cartilage graft was deemed most appropriate.  An appropriate donor site was identified, cleansed, and anesthetized. The cartilage graft was then harvested and transferred to the recipient site, oriented appropriately and then sutured into place.  The secondary defect was then repaired using a primary closure.
Composite Graft Text: The defect edges were debeveled with a #15 scalpel blade.  Given the location of the defect, shape of the defect, the proximity to free margins and the fact the defect was full thickness a composite graft was deemed most appropriate.  The defect was outline and then transferred to the donor site.  A full thickness graft was then excised from the donor site. The graft was then placed in the primary defect, oriented appropriately and then sutured into place.  The secondary defect was then repaired using a primary closure.
Epidermal Autograft Text: The defect edges were debeveled with a #15 scalpel blade.  Given the location of the defect, shape of the defect and the proximity to free margins an epidermal autograft was deemed most appropriate.  Using a sterile surgical marker, the primary defect shape was transferred to the donor site. The epidermal graft was then harvested.  The skin graft was then placed in the primary defect and oriented appropriately.
Dermal Autograft Text: The defect edges were debeveled with a #15 scalpel blade.  Given the location of the defect, shape of the defect and the proximity to free margins a dermal autograft was deemed most appropriate.  Using a sterile surgical marker, the primary defect shape was transferred to the donor site. The area thus outlined was incised deep to adipose tissue with a #15 scalpel blade.  The harvested graft was then trimmed of adipose and epidermal tissue until only dermis was left.  The skin graft was then placed in the primary defect and oriented appropriately.
Skin Substitute Text: The defect edges were debeveled with a #15 scalpel blade.  Given the location of the defect, shape of the defect and the proximity to free margins a skin substitute graft was deemed most appropriate.  The graft material was trimmed to fit the size of the defect. The graft was then placed in the primary defect and oriented appropriately.
Tissue Cultured Epidermal Autograft Text: The defect edges were debeveled with a #15 scalpel blade.  Given the location of the defect, shape of the defect and the proximity to free margins a tissue cultured epidermal autograft was deemed most appropriate.  The graft was then trimmed to fit the size of the defect.  The graft was then placed in the primary defect and oriented appropriately.
Xenograft Text: The defect edges were debeveled with a #15 scalpel blade.  Given the location of the defect, shape of the defect and the proximity to free margins a xenograft was deemed most appropriate.  The graft was then trimmed to fit the size of the defect.  The graft was then placed in the primary defect and oriented appropriately.
Purse String (Intermediate) Text: Given the location of the defect and the characteristics of the surrounding skin a pursestring intermediate closure was deemed most appropriate.  Undermining was performed circumfirentially around the surgical defect.  A purstring suture was then placed and tightened.
Purse String (Simple) Text: Given the location of the defect and the characteristics of the surrounding skin a purse string simple closure was deemed most appropriate.  Undermining was performed circumferentially around the surgical defect.  A purse string suture was then placed and tightened.
Partial Purse String (Intermediate) Text: Given the location of the defect and the characteristics of the surrounding skin an intermediate purse string closure was deemed most appropriate.  Undermining was performed circumferentially around the surgical defect.  A purse string suture was then placed and tightened. Wound tension of the circular defect prevented complete closure of the wound.
Partial Purse String (Simple) Text: Given the location of the defect and the characteristics of the surrounding skin a simple purse string closure was deemed most appropriate.  Undermining was performed circumferentially around the surgical defect.  A purse string suture was then placed and tightened. Wound tension of the circular defect prevented complete closure of the wound.
Complex Repair And Single Advancement Flap Text: The defect edges were debeveled with a #15 scalpel blade.  The primary defect was closed partially with a complex linear closure.  Given the location of the remaining defect, shape of the defect and the proximity to free margins a single advancement flap was deemed most appropriate for complete closure of the defect.  Using a sterile surgical marker, an appropriate advancement flap was drawn incorporating the defect and placing the expected incisions within the relaxed skin tension lines where possible.    The area thus outlined was incised deep to adipose tissue with a #15 scalpel blade.  The skin margins were undermined to an appropriate distance in all directions utilizing iris scissors.
Complex Repair And Double Advancement Flap Text: The defect edges were debeveled with a #15 scalpel blade.  The primary defect was closed partially with a complex linear closure.  Given the location of the remaining defect, shape of the defect and the proximity to free margins a double advancement flap was deemed most appropriate for complete closure of the defect.  Using a sterile surgical marker, an appropriate advancement flap was drawn incorporating the defect and placing the expected incisions within the relaxed skin tension lines where possible.    The area thus outlined was incised deep to adipose tissue with a #15 scalpel blade.  The skin margins were undermined to an appropriate distance in all directions utilizing iris scissors.
Complex Repair And Modified Advancement Flap Text: The defect edges were debeveled with a #15 scalpel blade.  The primary defect was closed partially with a complex linear closure.  Given the location of the remaining defect, shape of the defect and the proximity to free margins a modified advancement flap was deemed most appropriate for complete closure of the defect.  Using a sterile surgical marker, an appropriate advancement flap was drawn incorporating the defect and placing the expected incisions within the relaxed skin tension lines where possible.    The area thus outlined was incised deep to adipose tissue with a #15 scalpel blade.  The skin margins were undermined to an appropriate distance in all directions utilizing iris scissors.
Complex Repair And A-T Advancement Flap Text: The defect edges were debeveled with a #15 scalpel blade.  The primary defect was closed partially with a complex linear closure.  Given the location of the remaining defect, shape of the defect and the proximity to free margins an A-T advancement flap was deemed most appropriate for complete closure of the defect.  Using a sterile surgical marker, an appropriate advancement flap was drawn incorporating the defect and placing the expected incisions within the relaxed skin tension lines where possible.    The area thus outlined was incised deep to adipose tissue with a #15 scalpel blade.  The skin margins were undermined to an appropriate distance in all directions utilizing iris scissors.
Complex Repair And O-T Advancement Flap Text: The defect edges were debeveled with a #15 scalpel blade.  The primary defect was closed partially with a complex linear closure.  Given the location of the remaining defect, shape of the defect and the proximity to free margins an O-T advancement flap was deemed most appropriate for complete closure of the defect.  Using a sterile surgical marker, an appropriate advancement flap was drawn incorporating the defect and placing the expected incisions within the relaxed skin tension lines where possible.    The area thus outlined was incised deep to adipose tissue with a #15 scalpel blade.  The skin margins were undermined to an appropriate distance in all directions utilizing iris scissors.
Complex Repair And O-L Flap Text: The defect edges were debeveled with a #15 scalpel blade.  The primary defect was closed partially with a complex linear closure.  Given the location of the remaining defect, shape of the defect and the proximity to free margins an O-L flap was deemed most appropriate for complete closure of the defect.  Using a sterile surgical marker, an appropriate flap was drawn incorporating the defect and placing the expected incisions within the relaxed skin tension lines where possible.    The area thus outlined was incised deep to adipose tissue with a #15 scalpel blade.  The skin margins were undermined to an appropriate distance in all directions utilizing iris scissors.
Complex Repair And Bilobe Flap Text: The defect edges were debeveled with a #15 scalpel blade.  The primary defect was closed partially with a complex linear closure.  Given the location of the remaining defect, shape of the defect and the proximity to free margins a bilobe flap was deemed most appropriate for complete closure of the defect.  Using a sterile surgical marker, an appropriate advancement flap was drawn incorporating the defect and placing the expected incisions within the relaxed skin tension lines where possible.    The area thus outlined was incised deep to adipose tissue with a #15 scalpel blade.  The skin margins were undermined to an appropriate distance in all directions utilizing iris scissors.
Complex Repair And Melolabial Flap Text: The defect edges were debeveled with a #15 scalpel blade.  The primary defect was closed partially with a complex linear closure.  Given the location of the remaining defect, shape of the defect and the proximity to free margins a melolabial flap was deemed most appropriate for complete closure of the defect.  Using a sterile surgical marker, an appropriate advancement flap was drawn incorporating the defect and placing the expected incisions within the relaxed skin tension lines where possible.    The area thus outlined was incised deep to adipose tissue with a #15 scalpel blade.  The skin margins were undermined to an appropriate distance in all directions utilizing iris scissors.
Complex Repair And Rotation Flap Text: The defect edges were debeveled with a #15 scalpel blade.  The primary defect was closed partially with a complex linear closure.  Given the location of the remaining defect, shape of the defect and the proximity to free margins a rotation flap was deemed most appropriate for complete closure of the defect.  Using a sterile surgical marker, an appropriate advancement flap was drawn incorporating the defect and placing the expected incisions within the relaxed skin tension lines where possible.    The area thus outlined was incised deep to adipose tissue with a #15 scalpel blade.  The skin margins were undermined to an appropriate distance in all directions utilizing iris scissors.
Complex Repair And Rhombic Flap Text: The defect edges were debeveled with a #15 scalpel blade.  The primary defect was closed partially with a complex linear closure.  Given the location of the remaining defect, shape of the defect and the proximity to free margins a rhombic flap was deemed most appropriate for complete closure of the defect.  Using a sterile surgical marker, an appropriate advancement flap was drawn incorporating the defect and placing the expected incisions within the relaxed skin tension lines where possible.    The area thus outlined was incised deep to adipose tissue with a #15 scalpel blade.  The skin margins were undermined to an appropriate distance in all directions utilizing iris scissors.
Complex Repair And Transposition Flap Text: The defect edges were debeveled with a #15 scalpel blade.  The primary defect was closed partially with a complex linear closure.  Given the location of the remaining defect, shape of the defect and the proximity to free margins a transposition flap was deemed most appropriate for complete closure of the defect.  Using a sterile surgical marker, an appropriate advancement flap was drawn incorporating the defect and placing the expected incisions within the relaxed skin tension lines where possible.    The area thus outlined was incised deep to adipose tissue with a #15 scalpel blade.  The skin margins were undermined to an appropriate distance in all directions utilizing iris scissors.
Complex Repair And V-Y Plasty Text: The defect edges were debeveled with a #15 scalpel blade.  The primary defect was closed partially with a complex linear closure.  Given the location of the remaining defect, shape of the defect and the proximity to free margins a V-Y plasty was deemed most appropriate for complete closure of the defect.  Using a sterile surgical marker, an appropriate advancement flap was drawn incorporating the defect and placing the expected incisions within the relaxed skin tension lines where possible.    The area thus outlined was incised deep to adipose tissue with a #15 scalpel blade.  The skin margins were undermined to an appropriate distance in all directions utilizing iris scissors.
Complex Repair And M Plasty Text: The defect edges were debeveled with a #15 scalpel blade.  The primary defect was closed partially with a complex linear closure.  Given the location of the remaining defect, shape of the defect and the proximity to free margins an M plasty was deemed most appropriate for complete closure of the defect.  Using a sterile surgical marker, an appropriate advancement flap was drawn incorporating the defect and placing the expected incisions within the relaxed skin tension lines where possible.    The area thus outlined was incised deep to adipose tissue with a #15 scalpel blade.  The skin margins were undermined to an appropriate distance in all directions utilizing iris scissors.
Complex Repair And Double M Plasty Text: The defect edges were debeveled with a #15 scalpel blade.  The primary defect was closed partially with a complex linear closure.  Given the location of the remaining defect, shape of the defect and the proximity to free margins a double M plasty was deemed most appropriate for complete closure of the defect.  Using a sterile surgical marker, an appropriate advancement flap was drawn incorporating the defect and placing the expected incisions within the relaxed skin tension lines where possible.    The area thus outlined was incised deep to adipose tissue with a #15 scalpel blade.  The skin margins were undermined to an appropriate distance in all directions utilizing iris scissors.
Complex Repair And W Plasty Text: The defect edges were debeveled with a #15 scalpel blade.  The primary defect was closed partially with a complex linear closure.  Given the location of the remaining defect, shape of the defect and the proximity to free margins a W plasty was deemed most appropriate for complete closure of the defect.  Using a sterile surgical marker, an appropriate advancement flap was drawn incorporating the defect and placing the expected incisions within the relaxed skin tension lines where possible.    The area thus outlined was incised deep to adipose tissue with a #15 scalpel blade.  The skin margins were undermined to an appropriate distance in all directions utilizing iris scissors.
Complex Repair And Z Plasty Text: The defect edges were debeveled with a #15 scalpel blade.  The primary defect was closed partially with a complex linear closure.  Given the location of the remaining defect, shape of the defect and the proximity to free margins a Z plasty was deemed most appropriate for complete closure of the defect.  Using a sterile surgical marker, an appropriate advancement flap was drawn incorporating the defect and placing the expected incisions within the relaxed skin tension lines where possible.    The area thus outlined was incised deep to adipose tissue with a #15 scalpel blade.  The skin margins were undermined to an appropriate distance in all directions utilizing iris scissors.
Complex Repair And Dorsal Nasal Flap Text: The defect edges were debeveled with a #15 scalpel blade.  The primary defect was closed partially with a complex linear closure.  Given the location of the remaining defect, shape of the defect and the proximity to free margins a dorsal nasal flap was deemed most appropriate for complete closure of the defect.  Using a sterile surgical marker, an appropriate flap was drawn incorporating the defect and placing the expected incisions within the relaxed skin tension lines where possible.    The area thus outlined was incised deep to adipose tissue with a #15 scalpel blade.  The skin margins were undermined to an appropriate distance in all directions utilizing iris scissors.
Complex Repair And Ftsg Text: The defect edges were debeveled with a #15 scalpel blade.  The primary defect was closed partially with a complex linear closure.  Given the location of the defect, shape of the defect and the proximity to free margins a full thickness skin graft was deemed most appropriate to repair the remaining defect.  The graft was trimmed to fit the size of the remaining defect.  The graft was then placed in the primary defect, oriented appropriately, and sutured into place.
Complex Repair And Burow's Graft Text: The defect edges were debeveled with a #15 scalpel blade.  The primary defect was closed partially with a complex linear closure.  Given the location of the defect, shape of the defect, the proximity to free margins and the presence of a standing cone deformity a Burow's graft was deemed most appropriate to repair the remaining defect.  The graft was trimmed to fit the size of the remaining defect.  The graft was then placed in the primary defect, oriented appropriately, and sutured into place.
Complex Repair And Split-Thickness Skin Graft Text: The defect edges were debeveled with a #15 scalpel blade.  The primary defect was closed partially with a complex linear closure.  Given the location of the defect, shape of the defect and the proximity to free margins a split thickness skin graft was deemed most appropriate to repair the remaining defect.  The graft was trimmed to fit the size of the remaining defect.  The graft was then placed in the primary defect, oriented appropriately, and sutured into place.
Complex Repair And Epidermal Autograft Text: The defect edges were debeveled with a #15 scalpel blade.  The primary defect was closed partially with a complex linear closure.  Given the location of the defect, shape of the defect and the proximity to free margins an epidermal autograft was deemed most appropriate to repair the remaining defect.  The graft was trimmed to fit the size of the remaining defect.  The graft was then placed in the primary defect, oriented appropriately, and sutured into place.
Complex Repair And Dermal Autograft Text: The defect edges were debeveled with a #15 scalpel blade.  The primary defect was closed partially with a complex linear closure.  Given the location of the defect, shape of the defect and the proximity to free margins an dermal autograft was deemed most appropriate to repair the remaining defect.  The graft was trimmed to fit the size of the remaining defect.  The graft was then placed in the primary defect, oriented appropriately, and sutured into place.
Complex Repair And Tissue Cultured Epidermal Autograft Text: The defect edges were debeveled with a #15 scalpel blade.  The primary defect was closed partially with a complex linear closure.  Given the location of the defect, shape of the defect and the proximity to free margins an tissue cultured epidermal autograft was deemed most appropriate to repair the remaining defect.  The graft was trimmed to fit the size of the remaining defect.  The graft was then placed in the primary defect, oriented appropriately, and sutured into place.
Complex Repair And Xenograft Text: The defect edges were debeveled with a #15 scalpel blade.  The primary defect was closed partially with a complex linear closure.  Given the location of the defect, shape of the defect and the proximity to free margins an tissue cultured epidermal autograft was deemed most appropriate to repair the remaining defect.  The graft was trimmed to fit the size of the remaining defect.  The graft was then placed in the primary defect, oriented appropriately, and sutured into place.
Complex Repair And Skin Substitute Graft Text: The defect edges were debeveled with a #15 scalpel blade.  The primary defect was closed partially with a complex linear closure.  Given the location of the remaining defect, shape of the defect and the proximity to free margins a skin substitute graft was deemed most appropriate to repair the remaining defect.  The graft was trimmed to fit the size of the remaining defect.  The graft was then placed in the primary defect, oriented appropriately, and sutured into place.
Path Notes (To The Dermatopathologist): Please check margins.
Consent was obtained from the patient. The risks and benefits to therapy were discussed in detail. Specifically, the risks of infection, scarring, bleeding, prolonged wound healing, incomplete removal, allergy to anesthesia, nerve injury and recurrence were addressed. Prior to the procedure, the treatment site was clearly identified and confirmed by the patient. All components of Universal Protocol/PAUSE Rule completed.
Post-Care Instructions: I reviewed with the patient in detail post-care instructions. Patient is not to engage in any heavy lifting, exercise, or swimming for the next 7 days. Should the patient develop any fevers, chills, bleeding, severe pain patient will contact the office immediately.
Home Suture Removal Text: Patient was provided a home suture removal kit and will remove their sutures at home.  If they have any questions or difficulties they will call the office.
Where Do You Want The Question To Include Opioid Counseling Located?: Case Summary Tab
Information: Selecting Yes will display possible errors in your note based on the variables you have selected. This validation is only offered as a suggestion for you. PLEASE NOTE THAT THE VALIDATION TEXT WILL BE REMOVED WHEN YOU FINALIZE YOUR NOTE. IF YOU WANT TO FAX A PRELIMINARY NOTE YOU WILL NEED TO TOGGLE THIS TO 'NO' IF YOU DO NOT WANT IT IN YOUR FAXED NOTE.

## 2023-02-28 ENCOUNTER — APPOINTMENT (RX ONLY)
Dept: URBAN - METROPOLITAN AREA CLINIC 39 | Facility: CLINIC | Age: 73
Setting detail: DERMATOLOGY
End: 2023-02-28

## 2023-02-28 DIAGNOSIS — Z48.02 ENCOUNTER FOR REMOVAL OF SUTURES: ICD-10-CM

## 2023-02-28 PROCEDURE — 99024 POSTOP FOLLOW-UP VISIT: CPT

## 2023-02-28 PROCEDURE — ? SUTURE REMOVAL (GLOBAL PERIOD)

## 2023-02-28 ASSESSMENT — LOCATION ZONE DERM: LOCATION ZONE: TRUNK

## 2023-02-28 ASSESSMENT — LOCATION DETAILED DESCRIPTION DERM: LOCATION DETAILED: LEFT MID-UPPER BACK

## 2023-02-28 ASSESSMENT — LOCATION SIMPLE DESCRIPTION DERM: LOCATION SIMPLE: LEFT UPPER BACK

## 2023-02-28 NOTE — PROCEDURE: SUTURE REMOVAL (GLOBAL PERIOD)
Detail Level: Detailed
Add 44701 Cpt? (Important Note: In 2017 The Use Of 34489 Is Being Tracked By Cms To Determine Future Global Period Reimbursement For Global Periods): yes

## 2023-08-30 ENCOUNTER — APPOINTMENT (RX ONLY)
Dept: URBAN - METROPOLITAN AREA CLINIC 39 | Facility: CLINIC | Age: 73
Setting detail: DERMATOLOGY
End: 2023-08-30

## 2023-08-30 DIAGNOSIS — L57.8 OTHER SKIN CHANGES DUE TO CHRONIC EXPOSURE TO NONIONIZING RADIATION: ICD-10-CM

## 2023-08-30 DIAGNOSIS — L82.1 OTHER SEBORRHEIC KERATOSIS: ICD-10-CM

## 2023-08-30 DIAGNOSIS — Z85.828 PERSONAL HISTORY OF OTHER MALIGNANT NEOPLASM OF SKIN: ICD-10-CM

## 2023-08-30 DIAGNOSIS — L81.4 OTHER MELANIN HYPERPIGMENTATION: ICD-10-CM

## 2023-08-30 DIAGNOSIS — D18.0 HEMANGIOMA: ICD-10-CM

## 2023-08-30 DIAGNOSIS — D22 MELANOCYTIC NEVI: ICD-10-CM

## 2023-08-30 DIAGNOSIS — L57.0 ACTINIC KERATOSIS: ICD-10-CM | Status: INADEQUATELY CONTROLLED

## 2023-08-30 PROBLEM — D18.01 HEMANGIOMA OF SKIN AND SUBCUTANEOUS TISSUE: Status: ACTIVE | Noted: 2023-08-30

## 2023-08-30 PROBLEM — D22.5 MELANOCYTIC NEVI OF TRUNK: Status: ACTIVE | Noted: 2023-08-30

## 2023-08-30 PROCEDURE — ? PRESCRIPTION MEDICATION MANAGEMENT

## 2023-08-30 PROCEDURE — 17004 DESTROY PREMAL LESIONS 15/>: CPT

## 2023-08-30 PROCEDURE — ? SKIN MEDICINALS

## 2023-08-30 PROCEDURE — ? LIQUID NITROGEN

## 2023-08-30 PROCEDURE — ? COUNSELING

## 2023-08-30 PROCEDURE — 99214 OFFICE O/P EST MOD 30 MIN: CPT | Mod: 25

## 2023-08-30 PROCEDURE — ? PRESCRIPTION

## 2023-08-30 PROCEDURE — ? SUNSCREEN RECOMMENDATIONS

## 2023-08-30 ASSESSMENT — LOCATION SIMPLE DESCRIPTION DERM
LOCATION SIMPLE: POSTERIOR NECK
LOCATION SIMPLE: LEFT EAR
LOCATION SIMPLE: RIGHT UPPER BACK
LOCATION SIMPLE: SCALP
LOCATION SIMPLE: RIGHT CHEEK
LOCATION SIMPLE: CHEST
LOCATION SIMPLE: RIGHT EAR
LOCATION SIMPLE: LEFT SHOULDER

## 2023-08-30 ASSESSMENT — LOCATION DETAILED DESCRIPTION DERM
LOCATION DETAILED: LEFT POSTERIOR NECK
LOCATION DETAILED: RIGHT SUPERIOR LATERAL UPPER BACK
LOCATION DETAILED: LEFT MEDIAL INFERIOR CHEST
LOCATION DETAILED: RIGHT MID-UPPER BACK
LOCATION DETAILED: RIGHT SUPERIOR PARIETAL SCALP
LOCATION DETAILED: RIGHT INFERIOR CENTRAL MALAR CHEEK
LOCATION DETAILED: RIGHT SUPERIOR HELIX
LOCATION DETAILED: LEFT POSTERIOR SHOULDER
LOCATION DETAILED: LEFT SUPERIOR HELIX

## 2023-08-30 ASSESSMENT — LOCATION ZONE DERM
LOCATION ZONE: ARM
LOCATION ZONE: SCALP
LOCATION ZONE: EAR
LOCATION ZONE: TRUNK
LOCATION ZONE: FACE
LOCATION ZONE: NECK

## 2023-08-30 NOTE — PROCEDURE: PRESCRIPTION MEDICATION MANAGEMENT
Continue Regimen: [facility] [employer] chemotherapy cream patient is to do to the neck, forehead, cheeks BID x 4 days. Informed patient to do in segments
Render In Strict Bullet Format?: No
Detail Level: Zone
Plan: RTC in 6 months for FBSE and follow up

## 2023-08-30 NOTE — PROCEDURE: [facility] [employer]
Sig: Apply to affected areas on face twice daily
Sig: Apply nightly to warts nightly under occlusion
Sig: Apply to affected areas twice daily
Sig: Apply a thin layer to the areas with decreased hair density 1-2 times daily
Sig: Take one twice daily
Sig: Apply pea sized amount per area at night
Sig: Use as directed when washing hair
Sig: Take one pill daily
Sig: Take one pill twice daily
Sig: Apply twice daily for 5 days
Sig: Apply a thin layer to the affected areas daily
Sig: Apply a thin layer to the itching areas twice daily as needed
Sig: Wash affected areas daily.
Detail Level: Simple
Sig: Apply a thin layer to the affected areas twice daily
Sig: Apply a thin layer to the affected skin twice daily
Sig: Apply a thin layer to the scar daily
Sig: Apply to cheeks, forehead and back of neck BID for 4 days
Product Type (1): Chemotherapeutic
Sig: Apply to the affected skin twice daily
Sig: Apply a thin layer to the affected nails daily
Chemotherapeutic Medicines: 5-Fluorouracil 5%, Calcipotriene 0.005% Cream

## 2023-08-30 NOTE — PROCEDURE: LIQUID NITROGEN
Post-Care Instructions: I reviewed with the patient in detail post-care instructions. Patient is to wear sunprotection, and avoid picking at any of the treated lesions. Pt may apply Vaseline to crusted or scabbing areas.
Number Of Freeze-Thaw Cycles: 1 freeze-thaw cycle
Detail Level: Zone
Total Number Of Aks Treated: 16
Render In Bullet Format When Appropriate: No
Consent: The patient's consent was obtained including but not limited to risks of crusting, scabbing, blistering, scarring, darker or lighter pigmentary change, recurrence, incomplete removal and infection.
Duration Of Freeze Thaw-Cycle (Seconds): 20
Render Post-Care Instructions In Note?: yes

## 2024-02-26 ENCOUNTER — APPOINTMENT (RX ONLY)
Dept: URBAN - METROPOLITAN AREA CLINIC 39 | Facility: CLINIC | Age: 74
Setting detail: DERMATOLOGY
End: 2024-02-26

## 2024-02-26 DIAGNOSIS — L81.4 OTHER MELANIN HYPERPIGMENTATION: ICD-10-CM

## 2024-02-26 DIAGNOSIS — Z85.828 PERSONAL HISTORY OF OTHER MALIGNANT NEOPLASM OF SKIN: ICD-10-CM

## 2024-02-26 DIAGNOSIS — D18.0 HEMANGIOMA: ICD-10-CM

## 2024-02-26 DIAGNOSIS — L57.0 ACTINIC KERATOSIS: ICD-10-CM | Status: INADEQUATELY CONTROLLED

## 2024-02-26 DIAGNOSIS — D22 MELANOCYTIC NEVI: ICD-10-CM

## 2024-02-26 DIAGNOSIS — L57.8 OTHER SKIN CHANGES DUE TO CHRONIC EXPOSURE TO NONIONIZING RADIATION: ICD-10-CM | Status: INADEQUATELY CONTROLLED

## 2024-02-26 DIAGNOSIS — L82.1 OTHER SEBORRHEIC KERATOSIS: ICD-10-CM

## 2024-02-26 PROBLEM — D22.5 MELANOCYTIC NEVI OF TRUNK: Status: ACTIVE | Noted: 2024-02-26

## 2024-02-26 PROBLEM — D18.01 HEMANGIOMA OF SKIN AND SUBCUTANEOUS TISSUE: Status: ACTIVE | Noted: 2024-02-26

## 2024-02-26 PROCEDURE — 17004 DESTROY PREMAL LESIONS 15/>: CPT

## 2024-02-26 PROCEDURE — ? PRESCRIPTION MEDICATION MANAGEMENT

## 2024-02-26 PROCEDURE — ? MEDICATION COUNSELING

## 2024-02-26 PROCEDURE — ? COUNSELING

## 2024-02-26 PROCEDURE — ? SKIN MEDICINALS

## 2024-02-26 PROCEDURE — ? PRESCRIPTION

## 2024-02-26 PROCEDURE — ? LIQUID NITROGEN

## 2024-02-26 PROCEDURE — ? SUNSCREEN RECOMMENDATIONS

## 2024-02-26 PROCEDURE — 99214 OFFICE O/P EST MOD 30 MIN: CPT | Mod: 25

## 2024-02-26 ASSESSMENT — LOCATION ZONE DERM
LOCATION ZONE: TRUNK
LOCATION ZONE: FACE
LOCATION ZONE: ARM
LOCATION ZONE: HAND
LOCATION ZONE: NECK
LOCATION ZONE: EAR

## 2024-02-26 ASSESSMENT — LOCATION DETAILED DESCRIPTION DERM
LOCATION DETAILED: LEFT SUPERIOR CENTRAL MALAR CHEEK
LOCATION DETAILED: LEFT SUPERIOR LATERAL MALAR CHEEK
LOCATION DETAILED: LEFT DISTAL DORSAL FOREARM
LOCATION DETAILED: RIGHT DISTAL DORSAL FOREARM
LOCATION DETAILED: LEFT MEDIAL INFERIOR CHEST
LOCATION DETAILED: LEFT DORSAL WRIST
LOCATION DETAILED: RIGHT MEDIAL MALAR CHEEK
LOCATION DETAILED: LEFT POSTERIOR SHOULDER
LOCATION DETAILED: LEFT INFERIOR CENTRAL MALAR CHEEK
LOCATION DETAILED: LEFT POSTERIOR NECK
LOCATION DETAILED: RIGHT SUPERIOR LATERAL UPPER BACK
LOCATION DETAILED: LEFT ULNAR DORSAL HAND
LOCATION DETAILED: LEFT PROXIMAL DORSAL FOREARM
LOCATION DETAILED: RIGHT ULNAR DORSAL HAND
LOCATION DETAILED: RIGHT PROXIMAL DORSAL FOREARM
LOCATION DETAILED: RIGHT CENTRAL MALAR CHEEK
LOCATION DETAILED: RIGHT MID-UPPER BACK
LOCATION DETAILED: RIGHT SUPERIOR CENTRAL MALAR CHEEK
LOCATION DETAILED: RIGHT SUPERIOR LATERAL BUCCAL CHEEK
LOCATION DETAILED: RIGHT SUPERIOR HELIX
LOCATION DETAILED: LEFT SUPERIOR HELIX

## 2024-02-26 ASSESSMENT — LOCATION SIMPLE DESCRIPTION DERM
LOCATION SIMPLE: LEFT CHEEK
LOCATION SIMPLE: POSTERIOR NECK
LOCATION SIMPLE: LEFT EAR
LOCATION SIMPLE: CHEST
LOCATION SIMPLE: LEFT HAND
LOCATION SIMPLE: RIGHT UPPER BACK
LOCATION SIMPLE: RIGHT EAR
LOCATION SIMPLE: LEFT FOREARM
LOCATION SIMPLE: RIGHT HAND
LOCATION SIMPLE: RIGHT FOREARM
LOCATION SIMPLE: LEFT WRIST
LOCATION SIMPLE: LEFT SHOULDER
LOCATION SIMPLE: RIGHT CHEEK

## 2024-02-26 NOTE — PROCEDURE: SKIN MEDICINALS
Sig: Apply a thin layer to the affected areas twice daily
Sig: Take one pill twice daily
Sig: Apply a thin layer to the itching areas twice daily as needed
Sig: Apply nightly to warts nightly under occlusion
Sig: Wash affected areas daily.
Sig: Apply pea sized amount per area at night
Sig: Apply a thin layer to the affected areas daily
Sig: Apply to Affected Area BID for 4 days as needed for new lesions
Sig: Apply a thin layer to the scar daily
Detail Level: Simple
Sig: Apply a thin layer to the affected skin twice daily
Sig: Apply to affected areas twice daily
Sig: Apply twice daily for 5 days
Product Type (1): Chemotherapeutic
Sig: Apply to the affected skin twice daily
Sig: Take one twice daily
Sig: Apply a thin layer to the affected nails daily
Sig: Apply a thin layer to the areas with decreased hair density 1-2 times daily
Sig: Apply to affected areas on face twice daily
Sig: Use as directed when washing hair
Sig: Take one pill daily
Chemotherapeutic Medicines Prescription 1: 5-Fluorouracil 5%, Calcipotriene 0.005% Cream

## 2024-02-26 NOTE — PROCEDURE: MEDICATION COUNSELING
Olumiant Pregnancy And Lactation Text: Based on animal studies, Olumiant may cause embryo-fetal harm when administered to pregnant women.  The medication should not be used in pregnancy.  Breastfeeding is not recommended during treatment.
Hydroquinone Counseling:  Patient advised that medication may result in skin irritation, lightening (hypopigmentation), dryness, and burning.  In the event of skin irritation, the patient was advised to reduce the amount of the drug applied or use it less frequently.  Rarely, spots that are treated with hydroquinone can become darker (pseudoochronosis).  Should this occur, patient instructed to stop medication and call the office. The patient verbalized understanding of the proper use and possible adverse effects of hydroquinone.  All of the patient's questions and concerns were addressed.
Itraconazole Counseling:  I discussed with the patient the risks of itraconazole including but not limited to liver damage, nausea/vomiting, neuropathy, and severe allergy.  The patient understands that this medication is best absorbed when taken with acidic beverages such as non-diet cola or ginger ale.  The patient understands that monitoring is required including baseline LFTs and repeat LFTs at intervals.  The patient understands that they are to contact us or the primary physician if concerning signs are noted.
Doxepin Pregnancy And Lactation Text: This medication is Pregnancy Category C and it isn't known if it is safe during pregnancy. It is also excreted in breast milk and breast feeding isn't recommended.
Dutasteride Male Counseling: Dustasteride Counseling:  I discussed with the patient the risks of use of dutasteride including but not limited to decreased libido, decreased ejaculate volume, and gynecomastia. Women who can become pregnant should not handle medication.  All of the patient's questions and concerns were addressed.
Topical Clindamycin Counseling: Patient counseled that this medication may cause skin irritation or allergic reactions.  In the event of skin irritation, the patient was advised to reduce the amount of the drug applied or use it less frequently.   The patient verbalized understanding of the proper use and possible adverse effects of clindamycin.  All of the patient's questions and concerns were addressed.
Niacinamide Pregnancy And Lactation Text: These medications are considered safe during pregnancy.
Adbry Pregnancy And Lactation Text: It is unknown if this medication will adversely affect pregnancy or breast feeding.
Humira Pregnancy And Lactation Text: This medication is Pregnancy Category B and is considered safe during pregnancy. It is unknown if this medication is excreted in breast milk.
Calcipotriene Pregnancy And Lactation Text: The use of this medication during pregnancy or lactation is not recommended as there is insufficient data.
Rifampin Pregnancy And Lactation Text: This medication is Pregnancy Category C and it isn't know if it is safe during pregnancy. It is also excreted in breast milk and should not be used if you are breast feeding.
Rhofade Pregnancy And Lactation Text: This medication has not been assigned a Pregnancy Risk Category. It is unknown if the medication is excreted in breast milk.
Winlevi Counseling:  I discussed with the patient the risks of topical clascoterone including but not limited to erythema, scaling, itching, and stinging. Patient voiced their understanding.
Erythromycin Counseling:  I discussed with the patient the risks of erythromycin including but not limited to GI upset, allergic reaction, drug rash, diarrhea, increase in liver enzymes, and yeast infections.
Valtrex Counseling: I discussed with the patient the risks of valacyclovir including but not limited to kidney damage, nausea, vomiting and severe allergy.  The patient understands that if the infection seems to be worsening or is not improving, they are to call.
Aklief counseling:  Patient advised to apply a pea-sized amount only at bedtime and wait 30 minutes after washing their face before applying.  If too drying, patient may add a non-comedogenic moisturizer.  The most commonly reported side effects including irritation, redness, scaling, dryness, stinging, burning, itching, and increased risk of sunburn.  The patient verbalized understanding of the proper use and possible adverse effects of retinoids.  All of the patient's questions and concerns were addressed.
Opzelura Counseling:  I discussed with the patient the risks of Opzelura including but not limited to nasopharngitis, bronchitis, ear infection, eosinophila, hives, diarrhea, folliculitis, tonsillitis, and rhinorrhea.  Taken orally, this medication has been linked to serious infections; higher rate of mortality; malignancy and lymphoproliferative disorders; major adverse cardiovascular events; thrombosis; thrombocytopenia, anemia, and neutropenia; and lipid elevations.
Opioid Pregnancy And Lactation Text: These medications can lead to premature delivery and should be avoided during pregnancy. These medications are also present in breast milk in small amounts.
Xolair Counseling:  Patient informed of potential adverse effects including but not limited to fever, muscle aches, rash and allergic reactions.  The patient verbalized understanding of the proper use and possible adverse effects of Xolair.  All of the patient's questions and concerns were addressed.
Oxybutynin Pregnancy And Lactation Text: This medication is Pregnancy Category B and is considered safe during pregnancy. It is unknown if it is excreted in breast milk.
Cantharidin Counseling:  I discussed with the patient the risks of Cantharidin including but not limited to pain, redness, burning, itching, and blistering.
Azithromycin Pregnancy And Lactation Text: This medication is considered safe during pregnancy and is also secreted in breast milk.
Acitretin Counseling:  I discussed with the patient the risks of acitretin including but not limited to hair loss, dry lips/skin/eyes, liver damage, hyperlipidemia, depression/suicidal ideation, photosensitivity.  Serious rare side effects can include but are not limited to pancreatitis, pseudotumor cerebri, bony changes, clot formation/stroke/heart attack.  Patient understands that alcohol is contraindicated since it can result in liver toxicity and significantly prolong the elimination of the drug by many years.
Gabapentin Counseling: I discussed with the patient the risks of gabapentin including but not limited to dizziness, somnolence, fatigue and ataxia.
Sarecycline Counseling: Patient advised regarding possible photosensitivity and discoloration of the teeth, skin, lips, tongue and gums.  Patient instructed to avoid sunlight, if possible.  When exposed to sunlight, patients should wear protective clothing, sunglasses, and sunscreen.  The patient was instructed to call the office immediately if the following severe adverse effects occur:  hearing changes, easy bruising/bleeding, severe headache, or vision changes.  The patient verbalized understanding of the proper use and possible adverse effects of sarecycline.  All of the patient's questions and concerns were addressed.
Methotrexate Pregnancy And Lactation Text: This medication is Pregnancy Category X and is known to cause fetal harm. This medication is excreted in breast milk.
Itraconazole Pregnancy And Lactation Text: This medication is Pregnancy Category C and it isn't know if it is safe during pregnancy. It is also excreted in breast milk.
Bimzelx Counseling:  I discussed with the patient the risks of Bimzelx including but not limited to depression, immunosuppression, allergic reactions and infections.  The patient understands that monitoring is required including a PPD at baseline and must alert us or the primary physician if symptoms of infection or other concerning signs are noted.
Cantharidin Pregnancy And Lactation Text: This medication has not been proven safe during pregnancy. It is unknown if this medication is excreted in breast milk.
Rinvoq Counseling: I discussed with the patient the risks of Rinvoq therapy including but not limited to upper respiratory tract infections, shingles, cold sores, bronchitis, nausea, cough, fever, acne, and headache. Live vaccines should be avoided.  This medication has been linked to serious infections; higher rate of mortality; malignancy and lymphoproliferative disorders; major adverse cardiovascular events; thrombosis; thrombocytopenia, anemia, and neutropenia; lipid elevations; liver enzyme elevations; and gastrointestinal perforations.
Simponi Pregnancy And Lactation Text: The risk during pregnancy and breastfeeding is uncertain with this medication.
Winlevi Pregnancy And Lactation Text: This medication is considered safe during pregnancy and breastfeeding.
Ilumya Counseling: I discussed with the patient the risks of tildrakizumab including but not limited to immunosuppression, malignancy, posterior leukoencephalopathy syndrome, and serious infections.  The patient understands that monitoring is required including a PPD at baseline and must alert us or the primary physician if symptoms of infection or other concerning signs are noted.
Dutasteride Female Counseling: Dutasteride Counseling:  I discussed with the patient the risks of use of dutasteride including but not limited to decreased libido and sexual dysfunction. Explained the teratogenic nature of the medication and stressed the importance of not getting pregnant during treatment. All of the patient's questions and concerns were addressed.
Hydroquinone Pregnancy And Lactation Text: This medication has not been assigned a Pregnancy Risk Category but animal studies failed to show danger with the topical medication. It is unknown if the medication is excreted in breast milk.
Nsaids Counseling: NSAID Counseling: I discussed with the patient that NSAIDs should be taken with food. Prolonged use of NSAIDs can result in the development of stomach ulcers.  Patient advised to stop taking NSAIDs if abdominal pain occurs.  The patient verbalized understanding of the proper use and possible adverse effects of NSAIDs.  All of the patient's questions and concerns were addressed.
Azathioprine Counseling:  I discussed with the patient the risks of azathioprine including but not limited to myelosuppression, immunosuppression, hepatotoxicity, lymphoma, and infections.  The patient understands that monitoring is required including baseline LFTs, Creatinine, possible TPMP genotyping and weekly CBCs for the first month and then every 2 weeks thereafter.  The patient verbalized understanding of the proper use and possible adverse effects of azathioprine.  All of the patient's questions and concerns were addressed.
Valtrex Pregnancy And Lactation Text: this medication is Pregnancy Category B and is considered safe during pregnancy. This medication is not directly found in breast milk but it's metabolite acyclovir is present.
Gabapentin Pregnancy And Lactation Text: This medication is Pregnancy Category C and isn't considered safe during pregnancy. It is excreted in breast milk.
Bactrim Counseling:  I discussed with the patient the risks of sulfa antibiotics including but not limited to GI upset, allergic reaction, drug rash, diarrhea, dizziness, photosensitivity, and yeast infections.  Rarely, more serious reactions can occur including but not limited to aplastic anemia, agranulocytosis, methemoglobinemia, blood dyscrasias, liver or kidney failure, lung infiltrates or desquamative/blistering drug rashes.
Acitretin Pregnancy And Lactation Text: This medication is Pregnancy Category X and should not be given to women who are pregnant or may become pregnant in the future. This medication is excreted in breast milk.
Erythromycin Pregnancy And Lactation Text: This medication is Pregnancy Category B and is considered safe during pregnancy. It is also excreted in breast milk.
Solaraze Counseling:  I discussed with the patient the risks of Solaraze including but not limited to erythema, scaling, itching, weeping, crusting, and pain.
Hydroxyzine Counseling: Patient advised that the medication is sedating and not to drive a car after taking this medication.  Patient informed of potential adverse effects including but not limited to dry mouth, urinary retention, and blurry vision.  The patient verbalized understanding of the proper use and possible adverse effects of hydroxyzine.  All of the patient's questions and concerns were addressed.
5-Fu Counseling: 5-Fluorouracil Counseling:  I discussed with the patient the risks of 5-fluorouracil including but not limited to erythema, scaling, itching, weeping, crusting, and pain.
Xolair Pregnancy And Lactation Text: This medication is Pregnancy Category B and is considered safe during pregnancy. This medication is excreted in breast milk.
Bimzelx Pregnancy And Lactation Text: This medication crosses the placenta and the safety is uncertain during pregnancy. It is unknown if this medication is present in breast milk.
Prednisone Counseling:  I discussed with the patient the risks of prolonged use of prednisone including but not limited to weight gain, insomnia, osteoporosis, mood changes, diabetes, susceptibility to infection, glaucoma and high blood pressure.  In cases where prednisone use is prolonged, patients should be monitored with blood pressure checks, serum glucose levels and an eye exam.  Additionally, the patient may need to be placed on GI prophylaxis, PCP prophylaxis, and calcium and vitamin D supplementation and/or a bisphosphonate.  The patient verbalized understanding of the proper use and the possible adverse effects of prednisone.  All of the patient's questions and concerns were addressed.
Opzelura Pregnancy And Lactation Text: There is insufficient data to evaluate drug-associated risk for major birth defects, miscarriage, or other adverse maternal or fetal outcomes.  There is a pregnancy registry that monitors pregnancy outcomes in pregnant persons exposed to the medication during pregnancy.  It is unknown if this medication is excreted in breast milk.  Do not breastfeed during treatment and for about 4 weeks after the last dose.
Aklief Pregnancy And Lactation Text: It is unknown if this medication is safe to use during pregnancy.  It is unknown if this medication is excreted in breast milk.  Breastfeeding women should use the topical cream on the smallest area of the skin for the shortest time needed while breastfeeding.  Do not apply to nipple and areola.
Propranolol Counseling:  I discussed with the patient the risks of propranolol including but not limited to low heart rate, low blood pressure, low blood sugar, restlessness and increased cold sensitivity. They should call the office if they experience any of these side effects.
Skyrizi Counseling: I discussed with the patient the risks of risankizumab-rzaa including but not limited to immunosuppression, and serious infections.  The patient understands that monitoring is required including a PPD at baseline and must alert us or the primary physician if symptoms of infection or other concerning signs are noted.
Nsaids Pregnancy And Lactation Text: These medications are considered safe up to 30 weeks gestation. It is excreted in breast milk.
Arava Counseling:  Patient counseled regarding adverse effects of Arava including but not limited to nausea, vomiting, abnormalities in liver function tests. Patients may develop mouth sores, rash, diarrhea, and abnormalities in blood counts. The patient understands that monitoring is required including LFTs and blood counts.  There is a rare possibility of scarring of the liver and lung problems that can occur when taking methotrexate. Persistent nausea, loss of appetite, pale stools, dark urine, cough, and shortness of breath should be reported immediately. Patient advised to discontinue Arava treatment and consult with a physician prior to attempting conception. The patient will have to undergo a treatment to eliminate Arava from the body prior to conception.
Rinvoq Pregnancy And Lactation Text: Based on animal studies, Rinvoq may cause embryo-fetal harm when administered to pregnant women.  The medication should not be used in pregnancy.  Breastfeeding is not recommended during treatment and for 6 days after the last dose.
Sarecycline Pregnancy And Lactation Text: This medication is Pregnancy Category D and not consider safe during pregnancy. It is also excreted in breast milk.
VTAMA Counseling: I discussed with the patient that VTAMA is not for use in the eyes, mouth or mouth. They should call the office if they develop any signs of allergic reactions to VTAMA. The patient verbalized understanding of the proper use and possible adverse effects of VTAMA.  All of the patient's questions and concerns were addressed.
Dutasteride Pregnancy And Lactation Text: This medication is absolutely contraindicated in women, especially during pregnancy and breast feeding. Feminization of male fetuses is possible if taking while pregnant.
Imiquimod Counseling:  I discussed with the patient the risks of imiquimod including but not limited to erythema, scaling, itching, weeping, crusting, and pain.  Patient understands that the inflammatory response to imiquimod is variable from person to person and was educated regarded proper titration schedule.  If flu-like symptoms develop, patient knows to discontinue the medication and contact us.
Topical Ketoconazole Counseling: Patient counseled that this medication may cause skin irritation or allergic reactions.  In the event of skin irritation, the patient was advised to reduce the amount of the drug applied or use it less frequently.   The patient verbalized understanding of the proper use and possible adverse effects of ketoconazole.  All of the patient's questions and concerns were addressed.
5-Fu Pregnancy And Lactation Text: This medication is Pregnancy Category X and contraindicated in pregnancy and in women who may become pregnant. It is unknown if this medication is excreted in breast milk.
Solaraze Pregnancy And Lactation Text: This medication is Pregnancy Category B and is considered safe. There is some data to suggest avoiding during the third trimester. It is unknown if this medication is excreted in breast milk.
Use Enhanced Medication Counseling?: No
Hydroxyzine Pregnancy And Lactation Text: This medication is not safe during pregnancy and should not be taken. It is also excreted in breast milk and breast feeding isn't recommended.
Ketoconazole Counseling:   Patient counseled regarding improving absorption with orange juice.  Adverse effects include but are not limited to breast enlargement, headache, diarrhea, nausea, upset stomach, liver function test abnormalities, taste disturbance, and stomach pain.  There is a rare possibility of liver failure that can occur when taking ketoconazole. The patient understands that monitoring of LFTs may be required, especially at baseline. The patient verbalized understanding of the proper use and possible adverse effects of ketoconazole.  All of the patient's questions and concerns were addressed.
Azathioprine Pregnancy And Lactation Text: This medication is Pregnancy Category D and isn't considered safe during pregnancy. It is unknown if this medication is excreted in breast milk.
Metronidazole Counseling:  I discussed with the patient the risks of metronidazole including but not limited to seizures, nausea/vomiting, a metallic taste in the mouth, nausea/vomiting and severe allergy.
Glycopyrrolate Counseling:  I discussed with the patient the risks of glycopyrrolate including but not limited to skin rash, drowsiness, dry mouth, difficulty urinating, and blurred vision.
Bactrim Pregnancy And Lactation Text: This medication is Pregnancy Category D and is known to cause fetal risk.  It is also excreted in breast milk.
Bexarotene Counseling:  I discussed with the patient the risks of bexarotene including but not limited to hair loss, dry lips/skin/eyes, liver abnormalities, hyperlipidemia, pancreatitis, depression/suicidal ideation, photosensitivity, drug rash/allergic reactions, hypothyroidism, anemia, leukopenia, infection, cataracts, and teratogenicity.  Patient understands that they will need regular blood tests to check lipid profile, liver function tests, white blood cell count, thyroid function tests and pregnancy test if applicable.
Arava Pregnancy And Lactation Text: This medication is Pregnancy Category X and is absolutely contraindicated during pregnancy. It is unknown if it is excreted in breast milk.
Sotyktu Counseling:  I discussed the most common side effects of Sotyktu including: common cold, sore throat, sinus infections, cold sores, canker sores, folliculitis, and acne.  I also discussed more serious side effects of Sotyktu including but not limited to: serious allergic reactions; increased risk for infections such as TB; cancers such as lymphomas; rhabdomyolysis and elevated CPK; and elevated triglycerides and liver enzymes. 
Imiquimod Pregnancy And Lactation Text: This medication is Pregnancy Category C. It is unknown if this medication is excreted in breast milk.
Finasteride Male Counseling: Finasteride Counseling:  I discussed with the patient the risks of use of finasteride including but not limited to decreased libido, decreased ejaculate volume, gynecomastia, and depression. Women should not handle medication.  All of the patient's questions and concerns were addressed.
Olanzapine Counseling- I discussed with the patient the common side effects of olanzapine including but are not limited to: lack of energy, dry mouth, increased appetite, sleepiness, tremor, constipation, dizziness, changes in behavior, or restlessness.  Explained that teenagers are more likely to experience headaches, abdominal pain, pain in the arms or legs, tiredness, and sleepiness.  Serious side effects include but are not limited: increased risk of death in elderly patients who are confused, have memory loss, or dementia-related psychosis; hyperglycemia; increased cholesterol and triglycerides; and weight gain.
Azelaic Acid Counseling: Patient counseled that medicine may cause skin irritation and to avoid applying near the eyes.  In the event of skin irritation, the patient was advised to reduce the amount of the drug applied or use it less frequently.   The patient verbalized understanding of the proper use and possible adverse effects of azelaic acid.  All of the patient's questions and concerns were addressed.
Picato Counseling:  I discussed with the patient the risks of Picato including but not limited to erythema, scaling, itching, weeping, crusting, and pain.
Prednisone Pregnancy And Lactation Text: This medication is Pregnancy Category C and it isn't know if it is safe during pregnancy. This medication is excreted in breast milk.
Propranolol Pregnancy And Lactation Text: This medication is Pregnancy Category C and it isn't known if it is safe during pregnancy. It is excreted in breast milk.
Erivedge Counseling- I discussed with the patient the risks of Erivedge including but not limited to nausea, vomiting, diarrhea, constipation, weight loss, changes in the sense of taste, decreased appetite, muscle spasms, and hair loss.  The patient verbalized understanding of the proper use and possible adverse effects of Erivedge.  All of the patient's questions and concerns were addressed.
Tetracycline Counseling: Patient counseled regarding possible photosensitivity and increased risk for sunburn.  Patient instructed to avoid sunlight, if possible.  When exposed to sunlight, patients should wear protective clothing, sunglasses, and sunscreen.  The patient was instructed to call the office immediately if the following severe adverse effects occur:  hearing changes, easy bruising/bleeding, severe headache, or vision changes.  The patient verbalized understanding of the proper use and possible adverse effects of tetracycline.  All of the patient's questions and concerns were addressed. Patient understands to avoid pregnancy while on therapy due to potential birth defects.
Cimzia Counseling:  I discussed with the patient the risks of Cimzia including but not limited to immunosuppression, allergic reactions and infections.  The patient understands that monitoring is required including a PPD at baseline and must alert us or the primary physician if symptoms of infection or other concerning signs are noted.
Cellcept Counseling:  I discussed with the patient the risks of mycophenolate mofetil including but not limited to infection/immunosuppression, GI upset, hypokalemia, hypercholesterolemia, bone marrow suppression, lymphoproliferative disorders, malignancy, GI ulceration/bleed/perforation, colitis, interstitial lung disease, kidney failure, progressive multifocal leukoencephalopathy, and birth defects.  The patient understands that monitoring is required including a baseline creatinine and regular CBC testing. In addition, patient must alert us immediately if symptoms of infection or other concerning signs are noted.
Soolantra Counseling: I discussed with the patients the risks of topial Soolantra. This is a medicine which decreases the number of mites and inflammation in the skin. You experience burning, stinging, eye irritation or allergic reactions.  Please call our office if you develop any problems from using this medication.
Metronidazole Pregnancy And Lactation Text: This medication is Pregnancy Category B and considered safe during pregnancy.  It is also excreted in breast milk.
Vtama Pregnancy And Lactation Text: It is unknown if this medication can cause problems during pregnancy and breastfeeding.
Ketoconazole Pregnancy And Lactation Text: This medication is Pregnancy Category C and it isn't know if it is safe during pregnancy. It is also excreted in breast milk and breast feeding isn't recommended.
Infliximab Counseling:  I discussed with the patient the risks of infliximab including but not limited to myelosuppression, immunosuppression, autoimmune hepatitis, demyelinating diseases, lymphoma, and serious infections.  The patient understands that monitoring is required including a PPD at baseline and must alert us or the primary physician if symptoms of infection or other concerning signs are noted.
Drysol Counseling:  I discussed with the patient the risks of drysol/aluminum chloride including but not limited to skin rash, itching, irritation, burning.
Cephalexin Counseling: I counseled the patient regarding use of cephalexin as an antibiotic for prophylactic and/or therapeutic purposes. Cephalexin (commonly prescribed under brand name Keflex) is a cephalosporin antibiotic which is active against numerous classes of bacteria, including most skin bacteria. Side effects may include nausea, diarrhea, gastrointestinal upset, rash, hives, yeast infections, and in rare cases, hepatitis, kidney disease, seizures, fever, confusion, neurologic symptoms, and others. Patients with severe allergies to penicillin medications are cautioned that there is about a 10% incidence of cross-reactivity with cephalosporins. When possible, patients with penicillin allergies should use alternatives to cephalosporins for antibiotic therapy.
Bexarotene Pregnancy And Lactation Text: This medication is Pregnancy Category X and should not be given to women who are pregnant or may become pregnant. This medication should not be used if you are breast feeding.
Glycopyrrolate Pregnancy And Lactation Text: This medication is Pregnancy Category B and is considered safe during pregnancy. It is unknown if it is excreted breast milk.
Topical Metronidazole Counseling: Metronidazole is a topical antibiotic medication. You may experience burning, stinging, redness, or allergic reactions.  Please call our office if you develop any problems from using this medication.
Stelara Counseling:  I discussed with the patient the risks of ustekinumab including but not limited to immunosuppression, malignancy, posterior leukoencephalopathy syndrome, and serious infections.  The patient understands that monitoring is required including a PPD at baseline and must alert us or the primary physician if symptoms of infection or other concerning signs are noted.
SSKI Counseling:  I discussed with the patient the risks of SSKI including but not limited to thyroid abnormalities, metallic taste, GI upset, fever, headache, acne, arthralgias, paraesthesias, lymphadenopathy, easy bleeding, arrhythmias, and allergic reaction.
Cimzia Pregnancy And Lactation Text: This medication crosses the placenta but can be considered safe in certain situations. Cimzia may be excreted in breast milk.
Sotyktu Pregnancy And Lactation Text: There is insufficient data to evaluate whether or not Sotyktu is safe to use during pregnancy.   It is not known if Sotyktu passes into breast milk and whether or not it is safe to use when breastfeeding.  
Finasteride Female Counseling: Finasteride Counseling:  I discussed with the patient the risks of use of finasteride including but not limited to decreased libido and sexual dysfunction. Explained the teratogenic nature of the medication and stressed the importance of not getting pregnant during treatment. All of the patient's questions and concerns were addressed.
Albendazole Counseling:  I discussed with the patient the risks of albendazole including but not limited to cytopenia, kidney damage, nausea/vomiting and severe allergy.  The patient understands that this medication is being used in an off-label manner.
Clofazimine Counseling:  I discussed with the patient the risks of clofazimine including but not limited to skin and eye pigmentation, liver damage, nausea/vomiting, gastrointestinal bleeding and allergy.
Azelaic Acid Pregnancy And Lactation Text: This medication is considered safe during pregnancy and breast feeding.
Klisyri Counseling:  I discussed with the patient the risks of Klisyri including but not limited to erythema, scaling, itching, weeping, crusting, and pain.
Terbinafine Counseling: Patient counseling regarding adverse effects of terbinafine including but not limited to headache, diarrhea, rash, upset stomach, liver function test abnormalities, itching, taste/smell disturbance, nausea, abdominal pain, and flatulence.  There is a rare possibility of liver failure that can occur when taking terbinafine.  The patient understands that a baseline LFT and kidney function test may be required. The patient verbalized understanding of the proper use and possible adverse effects of terbinafine.  All of the patient's questions and concerns were addressed.
Olanzapine Pregnancy And Lactation Text: This medication is pregnancy category C.   There are no adequate and well controlled trials with olanzapine in pregnant females.  Olanzapine should be used during pregnancy only if the potential benefit justifies the potential risk to the fetus.   In a study in lactating healthy women, olanzapine was excreted in breast milk.  It is recommended that women taking olanzapine should not breast feed.
Isotretinoin Counseling: Patient should get monthly blood tests, not donate blood, not drive at night if vision affected, not share medication, and not undergo elective surgery for 6 months after tx completed. Side effects reviewed, pt to contact office should one occur.
Hydroxychloroquine Counseling:  I discussed with the patient that a baseline ophthalmologic exam is needed at the start of therapy and every year thereafter while on therapy. A CBC may also be warranted for monitoring.  The side effects of this medication were discussed with the patient, including but not limited to agranulocytosis, aplastic anemia, seizures, rashes, retinopathy, and liver toxicity. Patient instructed to call the office should any adverse effect occur.  The patient verbalized understanding of the proper use and possible adverse effects of Plaquenil.  All the patient's questions and concerns were addressed.
Zoryve Counseling:  I discussed with the patient that Zoryve is not for use in the eyes, mouth or vagina. The most commonly reported side effects include diarrhea, headache, insomnia, application site pain, upper respiratory tract infections, and urinary tract infections.  All of the patient's questions and concerns were addressed.
Minocycline Counseling: Patient advised regarding possible photosensitivity and discoloration of the teeth, skin, lips, tongue and gums.  Patient instructed to avoid sunlight, if possible.  When exposed to sunlight, patients should wear protective clothing, sunglasses, and sunscreen.  The patient was instructed to call the office immediately if the following severe adverse effects occur:  hearing changes, easy bruising/bleeding, severe headache, or vision changes.  The patient verbalized understanding of the proper use and possible adverse effects of minocycline.  All of the patient's questions and concerns were addressed.
Cibinqo Counseling: I discussed with the patient the risks of Cibinqo therapy including but not limited to common cold, nausea, headache, cold sores, increased blood CPK levels, dizziness, UTIs, fatigue, acne, and vomitting. Live vaccines should be avoided.  This medication has been linked to serious infections; higher rate of mortality; malignancy and lymphoproliferative disorders; major adverse cardiovascular events; thrombosis; thrombocytopenia and lymphopenia; lipid elevations; and retinal detachment.
Soolantra Pregnancy And Lactation Text: This medication is Pregnancy Category C. This medication is considered safe during breast feeding.
Cosentyx Counseling:  I discussed with the patient the risks of Cosentyx including but not limited to worsening of Crohn's disease, immunosuppression, allergic reactions and infections.  The patient understands that monitoring is required including a PPD at baseline and must alert us or the primary physician if symptoms of infection or other concerning signs are noted.
Albendazole Pregnancy And Lactation Text: This medication is Pregnancy Category C and it isn't known if it is safe during pregnancy. It is also excreted in breast milk.
Protopic Counseling: Patient may experience a mild burning sensation during topical application. Protopic is not approved in children less than 2 years of age. There have been case reports of hematologic and skin malignancies in patients using topical calcineurin inhibitors although causality is questionable.
Benzoyl Peroxide Counseling: Patient counseled that medicine may cause skin irritation and bleach clothing.  In the event of skin irritation, the patient was advised to reduce the amount of the drug applied or use it less frequently.   The patient verbalized understanding of the proper use and possible adverse effects of benzoyl peroxide.  All of the patient's questions and concerns were addressed.
Sski Pregnancy And Lactation Text: This medication is Pregnancy Category D and isn't considered safe during pregnancy. It is excreted in breast milk.
Cephalexin Pregnancy And Lactation Text: This medication is Pregnancy Category B and considered safe during pregnancy.  It is also excreted in breast milk but can be used safely for shorter doses.
Topical Steroids Applications Pregnancy And Lactation Text: Most topical steroids are considered safe to use during pregnancy and lactation.  Any topical steroid applied to the breast or nipple should be washed off before breastfeeding.
Libtayo Counseling- I discussed with the patient the risks of Libtayo including but not limited to nausea, vomiting, diarrhea, and bone or muscle pain.  The patient verbalized understanding of the proper use and possible adverse effects of Libtayo.  All of the patient's questions and concerns were addressed.
Detail Level: Simple
Xeljanz Counseling: I discussed with the patient the risks of Xeljanz therapy including increased risk of infection, liver issues, headache, diarrhea, or cold symptoms. Live vaccines should be avoided. They were instructed to call if they have any problems.
Topical Metronidazole Pregnancy And Lactation Text: This medication is Pregnancy Category B and considered safe during pregnancy.  It is also considered safe to use while breastfeeding.
Cibinqo Pregnancy And Lactation Text: It is unknown if this medication will adversely affect pregnancy or breast feeding.  You should not take this medication if you are currently pregnant or planning a pregnancy or while breastfeeding.
Topical Retinoid counseling:  Patient advised to apply a pea-sized amount only at bedtime and wait 30 minutes after washing their face before applying.  If too drying, patient may add a non-comedogenic moisturizer. The patient verbalized understanding of the proper use and possible adverse effects of retinoids.  All of the patient's questions and concerns were addressed.
Rituxan Counseling:  I discussed with the patient the risks of Rituxan infusions. Side effects can include infusion reactions, severe drug rashes including mucocutaneous reactions, reactivation of latent hepatitis and other infections and rarely progressive multifocal leukoencephalopathy.  All of the patient's questions and concerns were addressed.
Hydroxychloroquine Pregnancy And Lactation Text: This medication has been shown to cause fetal harm but it isn't assigned a Pregnancy Risk Category. There are small amounts excreted in breast milk.
Finasteride Pregnancy And Lactation Text: This medication is absolutely contraindicated during pregnancy. It is unknown if it is excreted in breast milk.
Klisyri Pregnancy And Lactation Text: It is unknown if this medication can harm a developing fetus or if it is excreted in breast milk.
Terbinafine Pregnancy And Lactation Text: This medication is Pregnancy Category B and is considered safe during pregnancy. It is also excreted in breast milk and breast feeding isn't recommended.
Cyclophosphamide Counseling:  I discussed with the patient the risks of cyclophosphamide including but not limited to hair loss, hormonal abnormalities, decreased fertility, abdominal pain, diarrhea, nausea and vomiting, bone marrow suppression and infection. The patient understands that monitoring is required while taking this medication.
Oral Minoxidil Counseling- I discussed with the patient the risks of oral minoxidil including but not limited to shortness of breath, swelling of the feet or ankles, dizziness, lightheadedness, unwanted hair growth and allergic reaction.  The patient verbalized understanding of the proper use and possible adverse effects of oral minoxidil.  All of the patient's questions and concerns were addressed.
Ivermectin Counseling:  Patient instructed to take medication on an empty stomach with a full glass of water.  Patient informed of potential adverse effects including but not limited to nausea, diarrhea, dizziness, itching, and swelling of the extremities or lymph nodes.  The patient verbalized understanding of the proper use and possible adverse effects of ivermectin.  All of the patient's questions and concerns were addressed.
Protopic Pregnancy And Lactation Text: This medication is Pregnancy Category C. It is unknown if this medication is excreted in breast milk when applied topically.
Clindamycin Counseling: I counseled the patient regarding use of clindamycin as an antibiotic for prophylactic and/or therapeutic purposes. Clindamycin is active against numerous classes of bacteria, including skin bacteria. Side effects may include nausea, diarrhea, gastrointestinal upset, rash, hives, yeast infections, and in rare cases, colitis.
Isotretinoin Pregnancy And Lactation Text: This medication is Pregnancy Category X and is considered extremely dangerous during pregnancy. It is unknown if it is excreted in breast milk.
Topical Sulfur Applications Counseling: Topical Sulfur Counseling: Patient counseled that this medication may cause skin irritation or allergic reactions.  In the event of skin irritation, the patient was advised to reduce the amount of the drug applied or use it less frequently.   The patient verbalized understanding of the proper use and possible adverse effects of topical sulfur application.  All of the patient's questions and concerns were addressed.
Elidel Counseling: Patient may experience a mild burning sensation during topical application. Elidel is not approved in children less than 2 years of age. There have been case reports of hematologic and skin malignancies in patients using topical calcineurin inhibitors although causality is questionable.
Fluconazole Counseling:  Patient counseled regarding adverse effects of fluconazole including but not limited to headache, diarrhea, nausea, upset stomach, liver function test abnormalities, taste disturbance, and stomach pain.  There is a rare possibility of liver failure that can occur when taking fluconazole.  The patient understands that monitoring of LFTs and kidney function test may be required, especially at baseline. The patient verbalized understanding of the proper use and possible adverse effects of fluconazole.  All of the patient's questions and concerns were addressed.
Taltz Counseling: I discussed with the patient the risks of ixekizumab including but not limited to immunosuppression, serious infections, worsening of inflammatory bowel disease and drug reactions.  The patient understands that monitoring is required including a PPD at baseline and must alert us or the primary physician if symptoms of infection or other concerning signs are noted.
Topical Steroids Counseling: I discussed with the patient that prolonged use of topical steroids can result in the increased appearance of superficial blood vessels (telangiectasias), lightening (hypopigmentation) and thinning of the skin (atrophy).  Patient understands to avoid using high potency steroids in skin folds, the groin or the face.  The patient verbalized understanding of the proper use and possible adverse effects of topical steroids.  All of the patient's questions and concerns were addressed.
Thalidomide Counseling: I discussed with the patient the risks of thalidomide including but not limited to birth defects, anxiety, weakness, chest pain, dizziness, cough and severe allergy.
Benzoyl Peroxide Pregnancy And Lactation Text: This medication is Pregnancy Category C. It is unknown if benzoyl peroxide is excreted in breast milk.
Libtayo Pregnancy And Lactation Text: This medication is contraindicated in pregnancy and when breast feeding.
Colchicine Counseling:  Patient counseled regarding adverse effects including but not limited to stomach upset (nausea, vomiting, stomach pain, or diarrhea).  Patient instructed to limit alcohol consumption while taking this medication.  Colchicine may reduce blood counts especially with prolonged use.  The patient understands that monitoring of kidney function and blood counts may be required, especially at baseline. The patient verbalized understanding of the proper use and possible adverse effects of colchicine.  All of the patient's questions and concerns were addressed.
Quinolones Counseling:  I discussed with the patient the risks of fluoroquinolones including but not limited to GI upset, allergic reaction, drug rash, diarrhea, dizziness, photosensitivity, yeast infections, liver function test abnormalities, tendonitis/tendon rupture.
Rituxan Pregnancy And Lactation Text: This medication is Pregnancy Category C and it isn't know if it is safe during pregnancy. It is unknown if this medication is excreted in breast milk but similar antibodies are known to be excreted.
Cyclophosphamide Pregnancy And Lactation Text: This medication is Pregnancy Category D and it isn't considered safe during pregnancy. This medication is excreted in breast milk.
Oral Minoxidil Pregnancy And Lactation Text: This medication should only be used when clearly needed if you are pregnant, attempting to become pregnant or breast feeding.
Litfulo Counseling: I discussed with the patient the risks of Litfulo therapy including but not limited to upper respiratory tract infections, shingles, cold sores, and nausea. Live vaccines should be avoided.  This medication has been linked to serious infections; higher rate of mortality; malignancy and lymphoproliferative disorders; major adverse cardiovascular events; thrombosis; gastrointestinal perforations; neutropenia; lymphopenia; anemia; liver enzyme elevations; and lipid elevations.
Zyclara Counseling:  I discussed with the patient the risks of imiquimod including but not limited to erythema, scaling, itching, weeping, crusting, and pain.  Patient understands that the inflammatory response to imiquimod is variable from person to person and was educated regarded proper titration schedule.  If flu-like symptoms develop, patient knows to discontinue the medication and contact us.
Birth Control Pills Counseling: Birth Control Pill Counseling: I discussed with the patient the potential side effects of OCPs including but not limited to increased risk of stroke, heart attack, thrombophlebitis, deep venous thrombosis, hepatic adenomas, breast changes, GI upset, headaches, and depression.  The patient verbalized understanding of the proper use and possible adverse effects of OCPs. All of the patient's questions and concerns were addressed.
Minoxidil Counseling: Minoxidil is a topical medication which can increase blood flow where it is applied. It is uncertain how this medication increases hair growth. Side effects are uncommon and include stinging and allergic reactions.
Cimetidine Counseling:  I discussed with the patient the risks of Cimetidine including but not limited to gynecomastia, headache, diarrhea, nausea, drowsiness, arrhythmias, pancreatitis, skin rashes, psychosis, bone marrow suppression and kidney toxicity.
Topical Sulfur Applications Pregnancy And Lactation Text: This medication is Pregnancy Category C and has an unknown safety profile during pregnancy. It is unknown if this topical medication is excreted in breast milk.
High Dose Vitamin A Counseling: Side effects reviewed, pt to contact office should one occur.
Qbrexza Counseling:  I discussed with the patient the risks of Qbrexza including but not limited to headache, mydriasis, blurred vision, dry eyes, nasal dryness, dry mouth, dry throat, dry skin, urinary hesitation, and constipation.  Local skin reactions including erythema, burning, stinging, and itching can also occur.
Low Dose Naltrexone Counseling- I discussed with the patient the potential risks and side effects of low dose naltrexone including but not limited to: more vivid dreams, headaches, nausea, vomiting, abdominal pain, fatigue, dizziness, and anxiety.
Clindamycin Pregnancy And Lactation Text: This medication can be used in pregnancy if certain situations. Clindamycin is also present in breast milk.
Carac Counseling:  I discussed with the patient the risks of Carac including but not limited to erythema, scaling, itching, weeping, crusting, and pain.
Birth Control Pills Pregnancy And Lactation Text: This medication should be avoided if pregnant and for the first 30 days post-partum.
Cyclosporine Counseling:  I discussed with the patient the risks of cyclosporine including but not limited to hypertension, gingival hyperplasia,myelosuppression, immunosuppression, liver damage, kidney damage, neurotoxicity, lymphoma, and serious infections. The patient understands that monitoring is required including baseline blood pressure, CBC, CMP, lipid panel and uric acid, and then 1-2 times monthly CMP and blood pressure.
Otezla Counseling: The side effects of Otezla were discussed with the patient, including but not limited to worsening or new depression, weight loss, diarrhea, nausea, upper respiratory tract infection, and headache. Patient instructed to call the office should any adverse effect occur.  The patient verbalized understanding of the proper use and possible adverse effects of Otezla.  All the patient's questions and concerns were addressed.
Dupixent Counseling: I discussed with the patient the risks of dupilumab including but not limited to eye infection and irritation, cold sores, injection site reactions, worsening of asthma, allergic reactions and increased risk of parasitic infection.  Live vaccines should be avoided while taking dupilumab. Dupilumab will also interact with certain medications such as warfarin and cyclosporine. The patient understands that monitoring is required and they must alert us or the primary physician if symptoms of infection or other concerning signs are noted.
Siliq Counseling:  I discussed with the patient the risks of Siliq including but not limited to new or worsening depression, suicidal thoughts and behavior, immunosuppression, malignancy, posterior leukoencephalopathy syndrome, and serious infections.  The patient understands that monitoring is required including a PPD at baseline and must alert us or the primary physician if symptoms of infection or other concerning signs are noted. There is also a special program designed to monitor depression which is required with Siliq.
Odomzo Counseling- I discussed with the patient the risks of Odomzo including but not limited to nausea, vomiting, diarrhea, constipation, weight loss, changes in the sense of taste, decreased appetite, muscle spasms, and hair loss.  The patient verbalized understanding of the proper use and possible adverse effects of Odomzo.  All of the patient's questions and concerns were addressed.
Tazorac Counseling:  Patient advised that medication is irritating and drying.  Patient may need to apply sparingly and wash off after an hour before eventually leaving it on overnight.  The patient verbalized understanding of the proper use and possible adverse effects of tazorac.  All of the patient's questions and concerns were addressed.
Litfulo Pregnancy And Lactation Text: Based on animal studies, Lifulo may cause embryo-fetal harm when administered to pregnant women.  The medication should not be used in pregnancy.  Breastfeeding is not recommended during treatment.
Wartpeel Counseling:  I discussed with the patient the risks of Wartpeel including but not limited to erythema, scaling, itching, weeping, crusting, and pain.
Tranexamic Acid Counseling:  Patient advised of the small risk of bleeding problems with tranexamic acid. They were also instructed to call if they developed any nausea, vomiting or diarrhea. All of the patient's questions and concerns were addressed.
Qbrexza Pregnancy And Lactation Text: There is no available data on Qbrexza use in pregnant women.  There is no available data on Qbrexza use in lactation.
Eucrisa Counseling: Patient may experience a mild burning sensation during topical application. Eucrisa is not approved in children less than 2 years of age.
Griseofulvin Counseling:  I discussed with the patient the risks of griseofulvin including but not limited to photosensitivity, cytopenia, liver damage, nausea/vomiting and severe allergy.  The patient understands that this medication is best absorbed when taken with a fatty meal (e.g., ice cream or french fries).
Doxycycline Counseling:  Patient counseled regarding possible photosensitivity and increased risk for sunburn.  Patient instructed to avoid sunlight, if possible.  When exposed to sunlight, patients should wear protective clothing, sunglasses, and sunscreen.  The patient was instructed to call the office immediately if the following severe adverse effects occur:  hearing changes, easy bruising/bleeding, severe headache, or vision changes.  The patient verbalized understanding of the proper use and possible adverse effects of doxycycline.  All of the patient's questions and concerns were addressed.
Tremfya Counseling: I discussed with the patient the risks of guselkumab including but not limited to immunosuppression, serious infections, worsening of inflammatory bowel disease and drug reactions.  The patient understands that monitoring is required including a PPD at baseline and must alert us or the primary physician if symptoms of infection or other concerning signs are noted.
Low Dose Naltrexone Pregnancy And Lactation Text: Naltrexone is pregnancy category C.  There have been no adequate and well-controlled studies in pregnant women.  It should be used in pregnancy only if the potential benefit justifies the potential risk to the fetus.   Limited data indicates that naltrexone is minimally excreted into breastmilk.
High Dose Vitamin A Pregnancy And Lactation Text: High dose vitamin A therapy is contraindicated during pregnancy and breast feeding.
Mirvaso Counseling: Mirvaso is a topical medication which can decrease superficial blood flow where applied. Side effects are uncommon and include stinging, redness and allergic reactions.
Dapsone Counseling: I discussed with the patient the risks of dapsone including but not limited to hemolytic anemia, agranulocytosis, rashes, methemoglobinemia, kidney failure, peripheral neuropathy, headaches, GI upset, and liver toxicity.  Patients who start dapsone require monitoring including baseline LFTs and weekly CBCs for the first month, then every month thereafter.  The patient verbalized understanding of the proper use and possible adverse effects of dapsone.  All of the patient's questions and concerns were addressed.
Dupixent Pregnancy And Lactation Text: This medication likely crosses the placenta but the risk for the fetus is uncertain. This medication is excreted in breast milk.
Spironolactone Counseling: Patient advised regarding risks of diarrhea, abdominal pain, hyperkalemia, birth defects (for female patients), liver toxicity and renal toxicity. The patient may need blood work to monitor liver and kidney function and potassium levels while on therapy. The patient verbalized understanding of the proper use and possible adverse effects of spironolactone.  All of the patient's questions and concerns were addressed.
Adbry Counseling: I discussed with the patient the risks of tralokinumab including but not limited to eye infection and irritation, cold sores, injection site reactions, worsening of asthma, allergic reactions and increased risk of parasitic infection.  Live vaccines should be avoided while taking tralokinumab. The patient understands that monitoring is required and they must alert us or the primary physician if symptoms of infection or other concerning signs are noted.
Otezla Pregnancy And Lactation Text: This medication is Pregnancy Category C and it isn't known if it is safe during pregnancy. It is unknown if it is excreted in breast milk.
Doxycycline Pregnancy And Lactation Text: This medication is Pregnancy Category D and not consider safe during pregnancy. It is also excreted in breast milk but is considered safe for shorter treatment courses.
Xelaubreyz Pregnancy And Lactation Text: This medication is Pregnancy Category D and is not considered safe during pregnancy.  The risk during breast feeding is also uncertain.
Rhofade Counseling: Rhofade is a topical medication which can decrease superficial blood flow where applied. Side effects are uncommon and include stinging, redness and allergic reactions.
Niacinamide Counseling: I recommended taking niacin or niacinamide, also know as vitamin B3, twice daily. Recent evidence suggests that taking vitamin B3 (500 mg twice daily) can reduce the risk of actinic keratoses and non-melanoma skin cancers. Side effects of vitamin B3 include flushing and headache.
Doxepin Counseling:  Patient advised that the medication is sedating and not to drive a car after taking this medication. Patient informed of potential adverse effects including but not limited to dry mouth, urinary retention, and blurry vision.  The patient verbalized understanding of the proper use and possible adverse effects of doxepin.  All of the patient's questions and concerns were addressed.
Tazorac Pregnancy And Lactation Text: This medication is not safe during pregnancy. It is unknown if this medication is excreted in breast milk.
Rifampin Counseling: I discussed with the patient the risks of rifampin including but not limited to liver damage, kidney damage, red-orange body fluids, nausea/vomiting and severe allergy.
Olumiant Counseling: I discussed with the patient the risks of Olumiant therapy including but not limited to upper respiratory tract infections, shingles, cold sores, and nausea. Live vaccines should be avoided.  This medication has been linked to serious infections; higher rate of mortality; malignancy and lymphoproliferative disorders; major adverse cardiovascular events; thrombosis; gastrointestinal perforations; neutropenia; lymphopenia; anemia; liver enzyme elevations; and lipid elevations.
Griseofulvin Pregnancy And Lactation Text: This medication is Pregnancy Category X and is known to cause serious birth defects. It is unknown if this medication is excreted in breast milk but breast feeding should be avoided.
Humira Counseling:  I discussed with the patient the risks of adalimumab including but not limited to myelosuppression, immunosuppression, autoimmune hepatitis, demyelinating diseases, lymphoma, and serious infections.  The patient understands that monitoring is required including a PPD at baseline and must alert us or the primary physician if symptoms of infection or other concerning signs are noted.
Enbrel Counseling:  I discussed with the patient the risks of etanercept including but not limited to myelosuppression, immunosuppression, autoimmune hepatitis, demyelinating diseases, lymphoma, and infections.  The patient understands that monitoring is required including a PPD at baseline and must alert us or the primary physician if symptoms of infection or other concerning signs are noted.
Opioid Counseling: I discussed with the patient the potential side effects of opioids including but not limited to addiction, altered mental status, and depression. I stressed avoiding alcohol, benzodiazepines, muscle relaxants and sleep aids unless specifically okayed by a physician. The patient verbalized understanding of the proper use and possible adverse effects of opioids. All of the patient's questions and concerns were addressed. They were instructed to flush the remaining pills down the toilet if they did not need them for pain.
Calcipotriene Counseling:  I discussed with the patient the risks of calcipotriene including but not limited to erythema, scaling, itching, and irritation.
Tranexamic Acid Pregnancy And Lactation Text: It is unknown if this medication is safe during pregnancy or breast feeding.
Oxybutynin Counseling:  I discussed with the patient the risks of oxybutynin including but not limited to skin rash, drowsiness, dry mouth, difficulty urinating, and blurred vision.
Simponi Counseling:  I discussed with the patient the risks of golimumab including but not limited to myelosuppression, immunosuppression, autoimmune hepatitis, demyelinating diseases, lymphoma, and serious infections.  The patient understands that monitoring is required including a PPD at baseline and must alert us or the primary physician if symptoms of infection or other concerning signs are noted.
Methotrexate Counseling:  Patient counseled regarding adverse effects of methotrexate including but not limited to nausea, vomiting, abnormalities in liver function tests. Patients may develop mouth sores, rash, diarrhea, and abnormalities in blood counts. The patient understands that monitoring is required including LFT's and blood counts.  There is a rare possibility of scarring of the liver and lung problems that can occur when taking methotrexate. Persistent nausea, loss of appetite, pale stools, dark urine, cough, and shortness of breath should be reported immediately. Patient advised to discontinue methotrexate treatment at least three months before attempting to become pregnant.  I discussed the need for folate supplements while taking methotrexate.  These supplements can decrease side effects during methotrexate treatment. The patient verbalized understanding of the proper use and possible adverse effects of methotrexate.  All of the patient's questions and concerns were addressed.
Azithromycin Counseling:  I discussed with the patient the risks of azithromycin including but not limited to GI upset, allergic reaction, drug rash, diarrhea, and yeast infections.
Dapsone Pregnancy And Lactation Text: This medication is Pregnancy Category C and is not considered safe during pregnancy or breast feeding.
Spironolactone Pregnancy And Lactation Text: This medication can cause feminization of the male fetus and should be avoided during pregnancy. The active metabolite is also found in breast milk.

## 2024-02-26 NOTE — PROCEDURE: PRESCRIPTION MEDICATION MANAGEMENT
Render In Strict Bullet Format?: No
Continue Regimen: SM1 5-Fluorouracil Calcipotriene 30G \\nQuantity: 30.0 g  Days Supply: 30\\nSM1 5-Fluorouracil Calcipotriene 30G (0.005% calcipotriene, 5% fluorouracil)\\nSig: Apply to Affected Area BID for 4 days as needed for new lesions
Detail Level: Zone

## 2024-02-26 NOTE — PROCEDURE: MIPS QUALITY
Quality 111:Pneumonia Vaccination Status For Older Adults: Patient received any pneumococcal conjugate or polysaccharide vaccine on or after their 60th birthday and before the end of the measurement period
Quality 226: Preventive Care And Screening: Tobacco Use: Screening And Cessation Intervention: Patient screened for tobacco use and is an ex/non-smoker
Quality 130: Documentation Of Current Medications In The Medical Record: Current Medications Documented
Detail Level: Detailed
Quality 47: Advance Care Plan: Advance Care Planning discussed and documented in the medical record; patient did not wish or was not able to name a surrogate decision maker or provide an advance care plan.
Quality 431: Preventive Care And Screening: Unhealthy Alcohol Use - Screening: Patient not identified as an unhealthy alcohol user when screened for unhealthy alcohol use using a systematic screening method

## 2024-02-26 NOTE — PROCEDURE: LIQUID NITROGEN
Detail Level: Detailed
Render Note In Bullet Format When Appropriate: No
Show Applicator Variable?: Yes
Post-Care Instructions: I reviewed with the patient in detail post-care instructions. Patient is to wear sunprotection, and avoid picking at any of the treated lesions. Pt may apply Vaseline to crusted or scabbing areas.
Duration Of Freeze Thaw-Cycle (Seconds): 30
Number Of Freeze-Thaw Cycles: 1 freeze-thaw cycle
Consent: The patient's consent was obtained including but not limited to risks of crusting, scabbing, blistering, scarring, darker or lighter pigmentary change, recurrence, incomplete removal and infection.

## 2024-09-10 ENCOUNTER — APPOINTMENT (RX ONLY)
Dept: URBAN - METROPOLITAN AREA CLINIC 39 | Facility: CLINIC | Age: 74
Setting detail: DERMATOLOGY
End: 2024-09-10

## 2024-09-10 ENCOUNTER — RX ONLY (OUTPATIENT)
Age: 74
Setting detail: RX ONLY
End: 2024-09-10

## 2024-09-10 DIAGNOSIS — D22 MELANOCYTIC NEVI: ICD-10-CM

## 2024-09-10 DIAGNOSIS — D18.0 HEMANGIOMA: ICD-10-CM

## 2024-09-10 DIAGNOSIS — Z85.828 PERSONAL HISTORY OF OTHER MALIGNANT NEOPLASM OF SKIN: ICD-10-CM

## 2024-09-10 DIAGNOSIS — L57.8 OTHER SKIN CHANGES DUE TO CHRONIC EXPOSURE TO NONIONIZING RADIATION: ICD-10-CM | Status: INADEQUATELY CONTROLLED

## 2024-09-10 DIAGNOSIS — L81.4 OTHER MELANIN HYPERPIGMENTATION: ICD-10-CM

## 2024-09-10 DIAGNOSIS — L57.0 ACTINIC KERATOSIS: ICD-10-CM | Status: INADEQUATELY CONTROLLED

## 2024-09-10 DIAGNOSIS — L82.1 OTHER SEBORRHEIC KERATOSIS: ICD-10-CM

## 2024-09-10 PROBLEM — D22.5 MELANOCYTIC NEVI OF TRUNK: Status: ACTIVE | Noted: 2024-09-10

## 2024-09-10 PROBLEM — D18.01 HEMANGIOMA OF SKIN AND SUBCUTANEOUS TISSUE: Status: ACTIVE | Noted: 2024-09-10

## 2024-09-10 PROCEDURE — ? COUNSELING

## 2024-09-10 PROCEDURE — ? LIQUID NITROGEN

## 2024-09-10 PROCEDURE — ? MEDICATION COUNSELING

## 2024-09-10 PROCEDURE — 17004 DESTROY PREMAL LESIONS 15/>: CPT

## 2024-09-10 PROCEDURE — ? SUNSCREEN RECOMMENDATIONS

## 2024-09-10 PROCEDURE — ? PRESCRIPTION MEDICATION MANAGEMENT

## 2024-09-10 PROCEDURE — 99214 OFFICE O/P EST MOD 30 MIN: CPT | Mod: 25

## 2024-09-10 ASSESSMENT — LOCATION DETAILED DESCRIPTION DERM
LOCATION DETAILED: RIGHT DORSAL WRIST
LOCATION DETAILED: RIGHT INFERIOR CENTRAL MALAR CHEEK
LOCATION DETAILED: LEFT INFERIOR FOREHEAD
LOCATION DETAILED: LEFT MEDIAL INFERIOR CHEST
LOCATION DETAILED: LEFT SUPERIOR HELIX
LOCATION DETAILED: RIGHT POSTERIOR NECK
LOCATION DETAILED: RIGHT MID-UPPER BACK
LOCATION DETAILED: LEFT CENTRAL ZYGOMA
LOCATION DETAILED: LEFT SUPERIOR MEDIAL FOREHEAD
LOCATION DETAILED: RIGHT INFERIOR LATERAL FOREHEAD
LOCATION DETAILED: LEFT ULNAR DORSAL HAND
LOCATION DETAILED: LEFT DISTAL DORSAL FOREARM
LOCATION DETAILED: RIGHT PROXIMAL DORSAL FOREARM
LOCATION DETAILED: LEFT SUPERIOR POSTERIOR NECK
LOCATION DETAILED: RIGHT SUPERIOR LATERAL UPPER BACK
LOCATION DETAILED: RIGHT SUPERIOR HELIX
LOCATION DETAILED: RIGHT SUPERIOR CENTRAL BUCCAL CHEEK
LOCATION DETAILED: RIGHT CENTRAL TEMPLE
LOCATION DETAILED: LEFT RADIAL DORSAL HAND
LOCATION DETAILED: RIGHT DISTAL DORSAL FOREARM
LOCATION DETAILED: MID POSTERIOR NECK
LOCATION DETAILED: RIGHT FOREHEAD
LOCATION DETAILED: RIGHT SUPERIOR POSTERIOR HELIX
LOCATION DETAILED: RIGHT ULNAR DORSAL HAND
LOCATION DETAILED: RIGHT SUPERIOR FOREHEAD
LOCATION DETAILED: LEFT POSTERIOR NECK
LOCATION DETAILED: LEFT PROXIMAL DORSAL FOREARM
LOCATION DETAILED: RIGHT RADIAL DORSAL HAND
LOCATION DETAILED: LEFT POSTERIOR SHOULDER

## 2024-09-10 ASSESSMENT — LOCATION SIMPLE DESCRIPTION DERM
LOCATION SIMPLE: RIGHT FOREARM
LOCATION SIMPLE: RIGHT HAND
LOCATION SIMPLE: RIGHT CHEEK
LOCATION SIMPLE: RIGHT WRIST
LOCATION SIMPLE: CHEST
LOCATION SIMPLE: RIGHT FOREHEAD
LOCATION SIMPLE: LEFT HAND
LOCATION SIMPLE: POSTERIOR NECK
LOCATION SIMPLE: RIGHT TEMPLE
LOCATION SIMPLE: RIGHT EAR
LOCATION SIMPLE: LEFT FOREARM
LOCATION SIMPLE: LEFT FOREHEAD
LOCATION SIMPLE: RIGHT UPPER BACK
LOCATION SIMPLE: LEFT EAR
LOCATION SIMPLE: LEFT SHOULDER
LOCATION SIMPLE: LEFT ZYGOMA

## 2024-09-10 ASSESSMENT — LOCATION ZONE DERM
LOCATION ZONE: TRUNK
LOCATION ZONE: ARM
LOCATION ZONE: NECK
LOCATION ZONE: HAND
LOCATION ZONE: EAR
LOCATION ZONE: FACE

## 2024-09-10 NOTE — PROCEDURE: MEDICATION COUNSELING
Thalidomide Counseling: I discussed with the patient the risks of thalidomide including but not limited to birth defects, anxiety, weakness, chest pain, dizziness, cough and severe allergy.
Bactrim Counseling:  I discussed with the patient the risks of sulfa antibiotics including but not limited to GI upset, allergic reaction, drug rash, diarrhea, dizziness, photosensitivity, and yeast infections.  Rarely, more serious reactions can occur including but not limited to aplastic anemia, agranulocytosis, methemoglobinemia, blood dyscrasias, liver or kidney failure, lung infiltrates or desquamative/blistering drug rashes.
Litfulo Counseling: I discussed with the patient the risks of Litfulo therapy including but not limited to upper respiratory tract infections, shingles, cold sores, and nausea. Live vaccines should be avoided.  This medication has been linked to serious infections; higher rate of mortality; malignancy and lymphoproliferative disorders; major adverse cardiovascular events; thrombosis; gastrointestinal perforations; neutropenia; lymphopenia; anemia; liver enzyme elevations; and lipid elevations.
Elidel Counseling: Patient may experience a mild burning sensation during topical application. Elidel is not approved in children less than 2 years of age. There have been case reports of hematologic and skin malignancies in patients using topical calcineurin inhibitors although causality is questionable.
Hydroxychloroquine Pregnancy And Lactation Text: This medication has been shown to cause fetal harm but it isn't assigned a Pregnancy Risk Category. There are small amounts excreted in breast milk.
Topical Sulfur Applications Counseling: Topical Sulfur Counseling: Patient counseled that this medication may cause skin irritation or allergic reactions.  In the event of skin irritation, the patient was advised to reduce the amount of the drug applied or use it less frequently.   The patient verbalized understanding of the proper use and possible adverse effects of topical sulfur application.  All of the patient's questions and concerns were addressed.
Simponi Counseling:  I discussed with the patient the risks of golimumab including but not limited to myelosuppression, immunosuppression, autoimmune hepatitis, demyelinating diseases, lymphoma, and serious infections.  The patient understands that monitoring is required including a PPD at baseline and must alert us or the primary physician if symptoms of infection or other concerning signs are noted.
Cyclosporine Counseling:  I discussed with the patient the risks of cyclosporine including but not limited to hypertension, gingival hyperplasia,myelosuppression, immunosuppression, liver damage, kidney damage, neurotoxicity, lymphoma, and serious infections. The patient understands that monitoring is required including baseline blood pressure, CBC, CMP, lipid panel and uric acid, and then 1-2 times monthly CMP and blood pressure.
Topical Retinoid counseling:  Patient advised to apply a pea-sized amount only at bedtime and wait 30 minutes after washing their face before applying.  If too drying, patient may add a non-comedogenic moisturizer. The patient verbalized understanding of the proper use and possible adverse effects of retinoids.  All of the patient's questions and concerns were addressed.
Humira Counseling:  I discussed with the patient the risks of adalimumab including but not limited to myelosuppression, immunosuppression, autoimmune hepatitis, demyelinating diseases, lymphoma, and serious infections.  The patient understands that monitoring is required including a PPD at baseline and must alert us or the primary physician if symptoms of infection or other concerning signs are noted.
Klisyri Pregnancy And Lactation Text: It is unknown if this medication can harm a developing fetus or if it is excreted in breast milk.
Odomzo Counseling- I discussed with the patient the risks of Odomzo including but not limited to nausea, vomiting, diarrhea, constipation, weight loss, changes in the sense of taste, decreased appetite, muscle spasms, and hair loss.  The patient verbalized understanding of the proper use and possible adverse effects of Odomzo.  All of the patient's questions and concerns were addressed.
Erythromycin Pregnancy And Lactation Text: This medication is Pregnancy Category B and is considered safe during pregnancy. It is also excreted in breast milk.
Birth Control Pills Pregnancy And Lactation Text: This medication should be avoided if pregnant and for the first 30 days post-partum.
Xelaubreyz Pregnancy And Lactation Text: This medication is Pregnancy Category D and is not considered safe during pregnancy.  The risk during breast feeding is also uncertain.
Taltz Pregnancy And Lactation Text: The risk during pregnancy and breastfeeding is uncertain with this medication.
Oral Minoxidil Counseling- I discussed with the patient the risks of oral minoxidil including but not limited to shortness of breath, swelling of the feet or ankles, dizziness, lightheadedness, unwanted hair growth and allergic reaction.  The patient verbalized understanding of the proper use and possible adverse effects of oral minoxidil.  All of the patient's questions and concerns were addressed.
Hydroxyzine Counseling: Patient advised that the medication is sedating and not to drive a car after taking this medication.  Patient informed of potential adverse effects including but not limited to dry mouth, urinary retention, and blurry vision.  The patient verbalized understanding of the proper use and possible adverse effects of hydroxyzine.  All of the patient's questions and concerns were addressed.
Zoryve Pregnancy And Lactation Text: It is unknown if this medication can cause problems during pregnancy and breastfeeding.
Itraconazole Pregnancy And Lactation Text: This medication is Pregnancy Category C and it isn't know if it is safe during pregnancy. It is also excreted in breast milk.
Topical Metronidazole Pregnancy And Lactation Text: This medication is Pregnancy Category B and considered safe during pregnancy.  It is also considered safe to use while breastfeeding.
Sarecycline Counseling: Patient advised regarding possible photosensitivity and discoloration of the teeth, skin, lips, tongue and gums.  Patient instructed to avoid sunlight, if possible.  When exposed to sunlight, patients should wear protective clothing, sunglasses, and sunscreen.  The patient was instructed to call the office immediately if the following severe adverse effects occur:  hearing changes, easy bruising/bleeding, severe headache, or vision changes.  The patient verbalized understanding of the proper use and possible adverse effects of sarecycline.  All of the patient's questions and concerns were addressed.
Benzoyl Peroxide Pregnancy And Lactation Text: This medication is Pregnancy Category C. It is unknown if benzoyl peroxide is excreted in breast milk.
Clofazimine Pregnancy And Lactation Text: This medication is Pregnancy Category C and isn't considered safe during pregnancy. It is excreted in breast milk.
Isotretinoin Pregnancy And Lactation Text: This medication is Pregnancy Category X and is considered extremely dangerous during pregnancy. It is unknown if it is excreted in breast milk.
Protopic Counseling: Patient may experience a mild burning sensation during topical application. Protopic is not approved in children less than 2 years of age. There have been case reports of hematologic and skin malignancies in patients using topical calcineurin inhibitors although causality is questionable.
Bimzelx Counseling:  I discussed with the patient the risks of Bimzelx including but not limited to depression, immunosuppression, allergic reactions and infections.  The patient understands that monitoring is required including a PPD at baseline and must alert us or the primary physician if symptoms of infection or other concerning signs are noted.
Azithromycin Pregnancy And Lactation Text: This medication is considered safe during pregnancy and is also secreted in breast milk.
Topical Steroids Applications Pregnancy And Lactation Text: Most topical steroids are considered safe to use during pregnancy and lactation.  Any topical steroid applied to the breast or nipple should be washed off before breastfeeding.
Drysol Pregnancy And Lactation Text: This medication is considered safe during pregnancy and breast feeding.
Sski Pregnancy And Lactation Text: This medication is Pregnancy Category D and isn't considered safe during pregnancy. It is excreted in breast milk.
Cibinqo Pregnancy And Lactation Text: It is unknown if this medication will adversely affect pregnancy or breast feeding.  You should not take this medication if you are currently pregnant or planning a pregnancy or while breastfeeding.
Hydroxychloroquine Counseling:  I discussed with the patient that a baseline ophthalmologic exam is needed at the start of therapy and every year thereafter while on therapy. A CBC may also be warranted for monitoring.  The side effects of this medication were discussed with the patient, including but not limited to agranulocytosis, aplastic anemia, seizures, rashes, retinopathy, and liver toxicity. Patient instructed to call the office should any adverse effect occur.  The patient verbalized understanding of the proper use and possible adverse effects of Plaquenil.  All the patient's questions and concerns were addressed.
Enbrel Pregnancy And Lactation Text: This medication is Pregnancy Category B and is considered safe during pregnancy. It is unknown if this medication is excreted in breast milk.
Birth Control Pills Counseling: Birth Control Pill Counseling: I discussed with the patient the potential side effects of OCPs including but not limited to increased risk of stroke, heart attack, thrombophlebitis, deep venous thrombosis, hepatic adenomas, breast changes, GI upset, headaches, and depression.  The patient verbalized understanding of the proper use and possible adverse effects of OCPs. All of the patient's questions and concerns were addressed.
Soolantra Pregnancy And Lactation Text: This medication is Pregnancy Category C. This medication is considered safe during breast feeding.
Cyclophosphamide Pregnancy And Lactation Text: This medication is Pregnancy Category D and it isn't considered safe during pregnancy. This medication is excreted in breast milk.
Erythromycin Counseling:  I discussed with the patient the risks of erythromycin including but not limited to GI upset, allergic reaction, drug rash, diarrhea, increase in liver enzymes, and yeast infections.
Zoryve Counseling:  I discussed with the patient that Zoryve is not for use in the eyes, mouth or vagina. The most commonly reported side effects include diarrhea, headache, insomnia, application site pain, upper respiratory tract infections, and urinary tract infections.  All of the patient's questions and concerns were addressed.
Xeljanz Counseling: I discussed with the patient the risks of Xeljanz therapy including increased risk of infection, liver issues, headache, diarrhea, or cold symptoms. Live vaccines should be avoided. They were instructed to call if they have any problems.
Klisyri Counseling:  I discussed with the patient the risks of Klisyri including but not limited to erythema, scaling, itching, weeping, crusting, and pain.
Taltz Counseling: I discussed with the patient the risks of ixekizumab including but not limited to immunosuppression, serious infections, worsening of inflammatory bowel disease and drug reactions.  The patient understands that monitoring is required including a PPD at baseline and must alert us or the primary physician if symptoms of infection or other concerning signs are noted.
Libtayo Pregnancy And Lactation Text: This medication is contraindicated in pregnancy and when breast feeding.
Doxepin Pregnancy And Lactation Text: This medication is Pregnancy Category C and it isn't known if it is safe during pregnancy. It is also excreted in breast milk and breast feeding isn't recommended.
Olanzapine Pregnancy And Lactation Text: This medication is pregnancy category C.   There are no adequate and well controlled trials with olanzapine in pregnant females.  Olanzapine should be used during pregnancy only if the potential benefit justifies the potential risk to the fetus.   In a study in lactating healthy women, olanzapine was excreted in breast milk.  It is recommended that women taking olanzapine should not breast feed.
Itraconazole Counseling:  I discussed with the patient the risks of itraconazole including but not limited to liver damage, nausea/vomiting, neuropathy, and severe allergy.  The patient understands that this medication is best absorbed when taken with acidic beverages such as non-diet cola or ginger ale.  The patient understands that monitoring is required including baseline LFTs and repeat LFTs at intervals.  The patient understands that they are to contact us or the primary physician if concerning signs are noted.
Topical Metronidazole Counseling: Metronidazole is a topical antibiotic medication. You may experience burning, stinging, redness, or allergic reactions.  Please call our office if you develop any problems from using this medication.
Rifampin Pregnancy And Lactation Text: This medication is Pregnancy Category C and it isn't know if it is safe during pregnancy. It is also excreted in breast milk and should not be used if you are breast feeding.
Picato Pregnancy And Lactation Text: This medication is Pregnancy Category C. It is unknown if this medication is excreted in breast milk.
Benzoyl Peroxide Counseling: Patient counseled that medicine may cause skin irritation and bleach clothing.  In the event of skin irritation, the patient was advised to reduce the amount of the drug applied or use it less frequently.   The patient verbalized understanding of the proper use and possible adverse effects of benzoyl peroxide.  All of the patient's questions and concerns were addressed.
Clofazimine Counseling:  I discussed with the patient the risks of clofazimine including but not limited to skin and eye pigmentation, liver damage, nausea/vomiting, gastrointestinal bleeding and allergy.
SSKI Counseling:  I discussed with the patient the risks of SSKI including but not limited to thyroid abnormalities, metallic taste, GI upset, fever, headache, acne, arthralgias, paraesthesias, lymphadenopathy, easy bleeding, arrhythmias, and allergic reaction.
Isotretinoin Counseling: Patient should get monthly blood tests, not donate blood, not drive at night if vision affected, not share medication, and not undergo elective surgery for 6 months after tx completed. Side effects reviewed, pt to contact office should one occur.
Glycopyrrolate Pregnancy And Lactation Text: This medication is Pregnancy Category B and is considered safe during pregnancy. It is unknown if it is excreted breast milk.
Adbry Pregnancy And Lactation Text: It is unknown if this medication will adversely affect pregnancy or breast feeding.
Cibinqo Counseling: I discussed with the patient the risks of Cibinqo therapy including but not limited to common cold, nausea, headache, cold sores, increased blood CPK levels, dizziness, UTIs, fatigue, acne, and vomitting. Live vaccines should be avoided.  This medication has been linked to serious infections; higher rate of mortality; malignancy and lymphoproliferative disorders; major adverse cardiovascular events; thrombosis; thrombocytopenia and lymphopenia; lipid elevations; and retinal detachment.
Siliq Counseling:  I discussed with the patient the risks of Siliq including but not limited to new or worsening depression, suicidal thoughts and behavior, immunosuppression, malignancy, posterior leukoencephalopathy syndrome, and serious infections.  The patient understands that monitoring is required including a PPD at baseline and must alert us or the primary physician if symptoms of infection or other concerning signs are noted. There is also a special program designed to monitor depression which is required with Siliq.
Drysol Counseling:  I discussed with the patient the risks of drysol/aluminum chloride including but not limited to skin rash, itching, irritation, burning.
Cyclophosphamide Counseling:  I discussed with the patient the risks of cyclophosphamide including but not limited to hair loss, hormonal abnormalities, decreased fertility, abdominal pain, diarrhea, nausea and vomiting, bone marrow suppression and infection. The patient understands that monitoring is required while taking this medication.
Azithromycin Counseling:  I discussed with the patient the risks of azithromycin including but not limited to GI upset, allergic reaction, drug rash, diarrhea, and yeast infections.
Soolantra Counseling: I discussed with the patients the risks of topial Soolantra. This is a medicine which decreases the number of mites and inflammation in the skin. You experience burning, stinging, eye irritation or allergic reactions.  Please call our office if you develop any problems from using this medication.
Doxycycline Pregnancy And Lactation Text: This medication is Pregnancy Category D and not consider safe during pregnancy. It is also excreted in breast milk but is considered safe for shorter treatment courses.
Sotyktu Pregnancy And Lactation Text: There is insufficient data to evaluate whether or not Sotyktu is safe to use during pregnancy.   It is not known if Sotyktu passes into breast milk and whether or not it is safe to use when breastfeeding.  
Libtayo Counseling- I discussed with the patient the risks of Libtayo including but not limited to nausea, vomiting, diarrhea, and bone or muscle pain.  The patient verbalized understanding of the proper use and possible adverse effects of Libtayo.  All of the patient's questions and concerns were addressed.
Finasteride Pregnancy And Lactation Text: This medication is absolutely contraindicated during pregnancy. It is unknown if it is excreted in breast milk.
Olanzapine Counseling- I discussed with the patient the common side effects of olanzapine including but are not limited to: lack of energy, dry mouth, increased appetite, sleepiness, tremor, constipation, dizziness, changes in behavior, or restlessness.  Explained that teenagers are more likely to experience headaches, abdominal pain, pain in the arms or legs, tiredness, and sleepiness.  Serious side effects include but are not limited: increased risk of death in elderly patients who are confused, have memory loss, or dementia-related psychosis; hyperglycemia; increased cholesterol and triglycerides; and weight gain.
Enbrel Counseling:  I discussed with the patient the risks of etanercept including but not limited to myelosuppression, immunosuppression, autoimmune hepatitis, demyelinating diseases, lymphoma, and infections.  The patient understands that monitoring is required including a PPD at baseline and must alert us or the primary physician if symptoms of infection or other concerning signs are noted.
Doxepin Counseling:  Patient advised that the medication is sedating and not to drive a car after taking this medication. Patient informed of potential adverse effects including but not limited to dry mouth, urinary retention, and blurry vision.  The patient verbalized understanding of the proper use and possible adverse effects of doxepin.  All of the patient's questions and concerns were addressed.
Topical Ketoconazole Pregnancy And Lactation Text: This medication is Pregnancy Category B and is considered safe during pregnancy. It is unknown if it is excreted in breast milk.
Griseofulvin Pregnancy And Lactation Text: This medication is Pregnancy Category X and is known to cause serious birth defects. It is unknown if this medication is excreted in breast milk but breast feeding should be avoided.
Rifampin Counseling: I discussed with the patient the risks of rifampin including but not limited to liver damage, kidney damage, red-orange body fluids, nausea/vomiting and severe allergy.
Picato Counseling:  I discussed with the patient the risks of Picato including but not limited to erythema, scaling, itching, weeping, crusting, and pain.
Bexarotene Pregnancy And Lactation Text: This medication is Pregnancy Category X and should not be given to women who are pregnant or may become pregnant. This medication should not be used if you are breast feeding.
Adbry Counseling: I discussed with the patient the risks of tralokinumab including but not limited to eye infection and irritation, cold sores, injection site reactions, worsening of asthma, allergic reactions and increased risk of parasitic infection.  Live vaccines should be avoided while taking tralokinumab. The patient understands that monitoring is required and they must alert us or the primary physician if symptoms of infection or other concerning signs are noted.
Propranolol Pregnancy And Lactation Text: This medication is Pregnancy Category C and it isn't known if it is safe during pregnancy. It is excreted in breast milk.
Arava Pregnancy And Lactation Text: This medication is Pregnancy Category X and is absolutely contraindicated during pregnancy. It is unknown if it is excreted in breast milk.
Rituxan Pregnancy And Lactation Text: This medication is Pregnancy Category C and it isn't know if it is safe during pregnancy. It is unknown if this medication is excreted in breast milk but similar antibodies are known to be excreted.
Finasteride Female Counseling: Finasteride Counseling:  I discussed with the patient the risks of use of finasteride including but not limited to decreased libido and sexual dysfunction. Explained the teratogenic nature of the medication and stressed the importance of not getting pregnant during treatment. All of the patient's questions and concerns were addressed.
5-Fu Pregnancy And Lactation Text: This medication is Pregnancy Category X and contraindicated in pregnancy and in women who may become pregnant. It is unknown if this medication is excreted in breast milk.
Solaraze Pregnancy And Lactation Text: This medication is Pregnancy Category B and is considered safe. There is some data to suggest avoiding during the third trimester. It is unknown if this medication is excreted in breast milk.
Cellcept Pregnancy And Lactation Text: This medication is Pregnancy Category D and isn't considered safe during pregnancy. It is unknown if this medication is excreted in breast milk.
Sotyktu Counseling:  I discussed the most common side effects of Sotyktu including: common cold, sore throat, sinus infections, cold sores, canker sores, folliculitis, and acne.  I also discussed more serious side effects of Sotyktu including but not limited to: serious allergic reactions; increased risk for infections such as TB; cancers such as lymphomas; rhabdomyolysis and elevated CPK; and elevated triglycerides and liver enzymes. 
Stelara Counseling:  I discussed with the patient the risks of ustekinumab including but not limited to immunosuppression, malignancy, posterior leukoencephalopathy syndrome, and serious infections.  The patient understands that monitoring is required including a PPD at baseline and must alert us or the primary physician if symptoms of infection or other concerning signs are noted.
Dupixent Pregnancy And Lactation Text: This medication likely crosses the placenta but the risk for the fetus is uncertain. This medication is excreted in breast milk.
Glycopyrrolate Counseling:  I discussed with the patient the risks of glycopyrrolate including but not limited to skin rash, drowsiness, dry mouth, difficulty urinating, and blurred vision.
Use Enhanced Medication Counseling?: No
Doxycycline Counseling:  Patient counseled regarding possible photosensitivity and increased risk for sunburn.  Patient instructed to avoid sunlight, if possible.  When exposed to sunlight, patients should wear protective clothing, sunglasses, and sunscreen.  The patient was instructed to call the office immediately if the following severe adverse effects occur:  hearing changes, easy bruising/bleeding, severe headache, or vision changes.  The patient verbalized understanding of the proper use and possible adverse effects of doxycycline.  All of the patient's questions and concerns were addressed.
Prednisone Pregnancy And Lactation Text: This medication is Pregnancy Category C and it isn't know if it is safe during pregnancy. This medication is excreted in breast milk.
Imiquimod Counseling:  I discussed with the patient the risks of imiquimod including but not limited to erythema, scaling, itching, weeping, crusting, and pain.  Patient understands that the inflammatory response to imiquimod is variable from person to person and was educated regarded proper titration schedule.  If flu-like symptoms develop, patient knows to discontinue the medication and contact us.
VTAMA Counseling: I discussed with the patient that VTAMA is not for use in the eyes, mouth or mouth. They should call the office if they develop any signs of allergic reactions to VTAMA. The patient verbalized understanding of the proper use and possible adverse effects of VTAMA.  All of the patient's questions and concerns were addressed.
Nsaids Pregnancy And Lactation Text: These medications are considered safe up to 30 weeks gestation. It is excreted in breast milk.
Cimetidine Pregnancy And Lactation Text: This medication is Pregnancy Category B and is considered safe during pregnancy. It is also excreted in breast milk and breast feeding isn't recommended.
Topical Ketoconazole Counseling: Patient counseled that this medication may cause skin irritation or allergic reactions.  In the event of skin irritation, the patient was advised to reduce the amount of the drug applied or use it less frequently.   The patient verbalized understanding of the proper use and possible adverse effects of ketoconazole.  All of the patient's questions and concerns were addressed.
Griseofulvin Counseling:  I discussed with the patient the risks of griseofulvin including but not limited to photosensitivity, cytopenia, liver damage, nausea/vomiting and severe allergy.  The patient understands that this medication is best absorbed when taken with a fatty meal (e.g., ice cream or french fries).
Azelaic Acid Counseling: Patient counseled that medicine may cause skin irritation and to avoid applying near the eyes.  In the event of skin irritation, the patient was advised to reduce the amount of the drug applied or use it less frequently.   The patient verbalized understanding of the proper use and possible adverse effects of azelaic acid.  All of the patient's questions and concerns were addressed.
Arava Counseling:  Patient counseled regarding adverse effects of Arava including but not limited to nausea, vomiting, abnormalities in liver function tests. Patients may develop mouth sores, rash, diarrhea, and abnormalities in blood counts. The patient understands that monitoring is required including LFTs and blood counts.  There is a rare possibility of scarring of the liver and lung problems that can occur when taking methotrexate. Persistent nausea, loss of appetite, pale stools, dark urine, cough, and shortness of breath should be reported immediately. Patient advised to discontinue Arava treatment and consult with a physician prior to attempting conception. The patient will have to undergo a treatment to eliminate Arava from the body prior to conception.
Bexarotene Counseling:  I discussed with the patient the risks of bexarotene including but not limited to hair loss, dry lips/skin/eyes, liver abnormalities, hyperlipidemia, pancreatitis, depression/suicidal ideation, photosensitivity, drug rash/allergic reactions, hypothyroidism, anemia, leukopenia, infection, cataracts, and teratogenicity.  Patient understands that they will need regular blood tests to check lipid profile, liver function tests, white blood cell count, thyroid function tests and pregnancy test if applicable.
Rituxan Counseling:  I discussed with the patient the risks of Rituxan infusions. Side effects can include infusion reactions, severe drug rashes including mucocutaneous reactions, reactivation of latent hepatitis and other infections and rarely progressive multifocal leukoencephalopathy.  All of the patient's questions and concerns were addressed.
Propranolol Counseling:  I discussed with the patient the risks of propranolol including but not limited to low heart rate, low blood pressure, low blood sugar, restlessness and increased cold sensitivity. They should call the office if they experience any of these side effects.
Opzelura Pregnancy And Lactation Text: There is insufficient data to evaluate drug-associated risk for major birth defects, miscarriage, or other adverse maternal or fetal outcomes.  There is a pregnancy registry that monitors pregnancy outcomes in pregnant persons exposed to the medication during pregnancy.  It is unknown if this medication is excreted in breast milk.  Do not breastfeed during treatment and for about 4 weeks after the last dose.
5-Fu Counseling: 5-Fluorouracil Counseling:  I discussed with the patient the risks of 5-fluorouracil including but not limited to erythema, scaling, itching, weeping, crusting, and pain.
Finasteride Male Counseling: Finasteride Counseling:  I discussed with the patient the risks of use of finasteride including but not limited to decreased libido, decreased ejaculate volume, gynecomastia, and depression. Women should not handle medication.  All of the patient's questions and concerns were addressed.
Valtrex Pregnancy And Lactation Text: this medication is Pregnancy Category B and is considered safe during pregnancy. This medication is not directly found in breast milk but it's metabolite acyclovir is present.
Cellcept Counseling:  I discussed with the patient the risks of mycophenolate mofetil including but not limited to infection/immunosuppression, GI upset, hypokalemia, hypercholesterolemia, bone marrow suppression, lymphoproliferative disorders, malignancy, GI ulceration/bleed/perforation, colitis, interstitial lung disease, kidney failure, progressive multifocal leukoencephalopathy, and birth defects.  The patient understands that monitoring is required including a baseline creatinine and regular CBC testing. In addition, patient must alert us immediately if symptoms of infection or other concerning signs are noted.
Prednisone Counseling:  I discussed with the patient the risks of prolonged use of prednisone including but not limited to weight gain, insomnia, osteoporosis, mood changes, diabetes, susceptibility to infection, glaucoma and high blood pressure.  In cases where prednisone use is prolonged, patients should be monitored with blood pressure checks, serum glucose levels and an eye exam.  Additionally, the patient may need to be placed on GI prophylaxis, PCP prophylaxis, and calcium and vitamin D supplementation and/or a bisphosphonate.  The patient verbalized understanding of the proper use and the possible adverse effects of prednisone.  All of the patient's questions and concerns were addressed.
Spevigo Pregnancy And Lactation Text: The risk during pregnancy and breastfeeding is uncertain with this medication. This medication does cross the placenta. It is unknown if this medication is found in breast milk.
Solaraze Counseling:  I discussed with the patient the risks of Solaraze including but not limited to erythema, scaling, itching, weeping, crusting, and pain.
Dupixent Counseling: I discussed with the patient the risks of dupilumab including but not limited to eye infection and irritation, cold sores, injection site reactions, worsening of asthma, allergic reactions and increased risk of parasitic infection.  Live vaccines should be avoided while taking dupilumab. Dupilumab will also interact with certain medications such as warfarin and cyclosporine. The patient understands that monitoring is required and they must alert us or the primary physician if symptoms of infection or other concerning signs are noted.
Winlevi Pregnancy And Lactation Text: This medication is considered safe during pregnancy and breastfeeding.
Rinvoq Pregnancy And Lactation Text: Based on animal studies, Rinvoq may cause embryo-fetal harm when administered to pregnant women.  The medication should not be used in pregnancy.  Breastfeeding is not recommended during treatment and for 6 days after the last dose.
Erivedge Counseling- I discussed with the patient the risks of Erivedge including but not limited to nausea, vomiting, diarrhea, constipation, weight loss, changes in the sense of taste, decreased appetite, muscle spasms, and hair loss.  The patient verbalized understanding of the proper use and possible adverse effects of Erivedge.  All of the patient's questions and concerns were addressed.
Ivermectin Pregnancy And Lactation Text: This medication is Pregnancy Category C and it isn't known if it is safe during pregnancy. It is also excreted in breast milk.
Cimetidine Counseling:  I discussed with the patient the risks of Cimetidine including but not limited to gynecomastia, headache, diarrhea, nausea, drowsiness, arrhythmias, pancreatitis, skin rashes, psychosis, bone marrow suppression and kidney toxicity.
Hydroquinone Pregnancy And Lactation Text: This medication has not been assigned a Pregnancy Risk Category but animal studies failed to show danger with the topical medication. It is unknown if the medication is excreted in breast milk.
Clindamycin Pregnancy And Lactation Text: This medication can be used in pregnancy if certain situations. Clindamycin is also present in breast milk.
Cantharidin Pregnancy And Lactation Text: This medication has not been proven safe during pregnancy. It is unknown if this medication is excreted in breast milk.
Aklief Pregnancy And Lactation Text: It is unknown if this medication is safe to use during pregnancy.  It is unknown if this medication is excreted in breast milk.  Breastfeeding women should use the topical cream on the smallest area of the skin for the shortest time needed while breastfeeding.  Do not apply to nipple and areola.
Acitretin Pregnancy And Lactation Text: This medication is Pregnancy Category X and should not be given to women who are pregnant or may become pregnant in the future. This medication is excreted in breast milk.
Nsaids Counseling: NSAID Counseling: I discussed with the patient that NSAIDs should be taken with food. Prolonged use of NSAIDs can result in the development of stomach ulcers.  Patient advised to stop taking NSAIDs if abdominal pain occurs.  The patient verbalized understanding of the proper use and possible adverse effects of NSAIDs.  All of the patient's questions and concerns were addressed.
Quinolones Counseling:  I discussed with the patient the risks of fluoroquinolones including but not limited to GI upset, allergic reaction, drug rash, diarrhea, dizziness, photosensitivity, yeast infections, liver function test abnormalities, tendonitis/tendon rupture.
Opzelura Counseling:  I discussed with the patient the risks of Opzelura including but not limited to nasopharngitis, bronchitis, ear infection, eosinophila, hives, diarrhea, folliculitis, tonsillitis, and rhinorrhea.  Taken orally, this medication has been linked to serious infections; higher rate of mortality; malignancy and lymphoproliferative disorders; major adverse cardiovascular events; thrombosis; thrombocytopenia, anemia, and neutropenia; and lipid elevations.
Cantharidin Counseling:  I discussed with the patient the risks of Cantharidin including but not limited to pain, redness, burning, itching, and blistering.
Opioid Pregnancy And Lactation Text: These medications can lead to premature delivery and should be avoided during pregnancy. These medications are also present in breast milk in small amounts.
Valtrex Counseling: I discussed with the patient the risks of valacyclovir including but not limited to kidney damage, nausea, vomiting and severe allergy.  The patient understands that if the infection seems to be worsening or is not improving, they are to call.
Rinvoq Counseling: I discussed with the patient the risks of Rinvoq therapy including but not limited to upper respiratory tract infections, shingles, cold sores, bronchitis, nausea, cough, fever, acne, and headache. Live vaccines should be avoided.  This medication has been linked to serious infections; higher rate of mortality; malignancy and lymphoproliferative disorders; major adverse cardiovascular events; thrombosis; thrombocytopenia, anemia, and neutropenia; lipid elevations; liver enzyme elevations; and gastrointestinal perforations.
Dutasteride Pregnancy And Lactation Text: This medication is absolutely contraindicated in women, especially during pregnancy and breast feeding. Feminization of male fetuses is possible if taking while pregnant.
Gabapentin Counseling: I discussed with the patient the risks of gabapentin including but not limited to dizziness, somnolence, fatigue and ataxia.
Rhofade Pregnancy And Lactation Text: This medication has not been assigned a Pregnancy Risk Category. It is unknown if the medication is excreted in breast milk.
Ivermectin Counseling:  Patient instructed to take medication on an empty stomach with a full glass of water.  Patient informed of potential adverse effects including but not limited to nausea, diarrhea, dizziness, itching, and swelling of the extremities or lymph nodes.  The patient verbalized understanding of the proper use and possible adverse effects of ivermectin.  All of the patient's questions and concerns were addressed.
Winlevi Counseling:  I discussed with the patient the risks of topical clascoterone including but not limited to erythema, scaling, itching, and stinging. Patient voiced their understanding.
Clindamycin Counseling: I counseled the patient regarding use of clindamycin as an antibiotic for prophylactic and/or therapeutic purposes. Clindamycin is active against numerous classes of bacteria, including skin bacteria. Side effects may include nausea, diarrhea, gastrointestinal upset, rash, hives, yeast infections, and in rare cases, colitis.
Methotrexate Pregnancy And Lactation Text: This medication is Pregnancy Category X and is known to cause fetal harm. This medication is excreted in breast milk.
Fluconazole Counseling:  Patient counseled regarding adverse effects of fluconazole including but not limited to headache, diarrhea, nausea, upset stomach, liver function test abnormalities, taste disturbance, and stomach pain.  There is a rare possibility of liver failure that can occur when taking fluconazole.  The patient understands that monitoring of LFTs and kidney function test may be required, especially at baseline. The patient verbalized understanding of the proper use and possible adverse effects of fluconazole.  All of the patient's questions and concerns were addressed.
Hydroquinone Counseling:  Patient advised that medication may result in skin irritation, lightening (hypopigmentation), dryness, and burning.  In the event of skin irritation, the patient was advised to reduce the amount of the drug applied or use it less frequently.  Rarely, spots that are treated with hydroquinone can become darker (pseudoochronosis).  Should this occur, patient instructed to stop medication and call the office. The patient verbalized understanding of the proper use and possible adverse effects of hydroquinone.  All of the patient's questions and concerns were addressed.
Spevigo Counseling: I discussed with the patient the risks of Spevigo including but not limited to fatigue, nasuea, vomiting, headache, pruritus, urinary tract infection, an infusion related reactions.  The patient understands that monitoring is required including screening for tuberculosis at baseline and yearly screening thereafter while continuing Spevigo therapy. They should contact us if symptoms of infection or other concerning signs are noted.
Aklief counseling:  Patient advised to apply a pea-sized amount only at bedtime and wait 30 minutes after washing their face before applying.  If too drying, patient may add a non-comedogenic moisturizer.  The most commonly reported side effects including irritation, redness, scaling, dryness, stinging, burning, itching, and increased risk of sunburn.  The patient verbalized understanding of the proper use and possible adverse effects of retinoids.  All of the patient's questions and concerns were addressed.
Niacinamide Pregnancy And Lactation Text: These medications are considered safe during pregnancy.
Topical Clindamycin Counseling: Patient counseled that this medication may cause skin irritation or allergic reactions.  In the event of skin irritation, the patient was advised to reduce the amount of the drug applied or use it less frequently.   The patient verbalized understanding of the proper use and possible adverse effects of clindamycin.  All of the patient's questions and concerns were addressed.
Acitretin Counseling:  I discussed with the patient the risks of acitretin including but not limited to hair loss, dry lips/skin/eyes, liver damage, hyperlipidemia, depression/suicidal ideation, photosensitivity.  Serious rare side effects can include but are not limited to pancreatitis, pseudotumor cerebri, bony changes, clot formation/stroke/heart attack.  Patient understands that alcohol is contraindicated since it can result in liver toxicity and significantly prolong the elimination of the drug by many years.
Minocycline Pregnancy And Lactation Text: This medication is Pregnancy Category D and not consider safe during pregnancy. It is also excreted in breast milk.
Xolair Pregnancy And Lactation Text: This medication is Pregnancy Category B and is considered safe during pregnancy. This medication is excreted in breast milk.
Oxybutynin Counseling:  I discussed with the patient the risks of oxybutynin including but not limited to skin rash, drowsiness, dry mouth, difficulty urinating, and blurred vision.
Calcipotriene Pregnancy And Lactation Text: The use of this medication during pregnancy or lactation is not recommended as there is insufficient data.
Infliximab Counseling:  I discussed with the patient the risks of infliximab including but not limited to myelosuppression, immunosuppression, autoimmune hepatitis, demyelinating diseases, lymphoma, and serious infections.  The patient understands that monitoring is required including a PPD at baseline and must alert us or the primary physician if symptoms of infection or other concerning signs are noted.
Dapsone Pregnancy And Lactation Text: This medication is Pregnancy Category C and is not considered safe during pregnancy or breast feeding.
Dutasteride Female Counseling: Dutasteride Counseling:  I discussed with the patient the risks of use of dutasteride including but not limited to decreased libido and sexual dysfunction. Explained the teratogenic nature of the medication and stressed the importance of not getting pregnant during treatment. All of the patient's questions and concerns were addressed.
Azathioprine Counseling:  I discussed with the patient the risks of azathioprine including but not limited to myelosuppression, immunosuppression, hepatotoxicity, lymphoma, and infections.  The patient understands that monitoring is required including baseline LFTs, Creatinine, possible TPMP genotyping and weekly CBCs for the first month and then every 2 weeks thereafter.  The patient verbalized understanding of the proper use and possible adverse effects of azathioprine.  All of the patient's questions and concerns were addressed.
Methotrexate Counseling:  Patient counseled regarding adverse effects of methotrexate including but not limited to nausea, vomiting, abnormalities in liver function tests. Patients may develop mouth sores, rash, diarrhea, and abnormalities in blood counts. The patient understands that monitoring is required including LFT's and blood counts.  There is a rare possibility of scarring of the liver and lung problems that can occur when taking methotrexate. Persistent nausea, loss of appetite, pale stools, dark urine, cough, and shortness of breath should be reported immediately. Patient advised to discontinue methotrexate treatment at least three months before attempting to become pregnant.  I discussed the need for folate supplements while taking methotrexate.  These supplements can decrease side effects during methotrexate treatment. The patient verbalized understanding of the proper use and possible adverse effects of methotrexate.  All of the patient's questions and concerns were addressed.
Rhofade Counseling: Rhofade is a topical medication which can decrease superficial blood flow where applied. Side effects are uncommon and include stinging, redness and allergic reactions.
Opioid Counseling: I discussed with the patient the potential side effects of opioids including but not limited to addiction, altered mental status, and depression. I stressed avoiding alcohol, benzodiazepines, muscle relaxants and sleep aids unless specifically okayed by a physician. The patient verbalized understanding of the proper use and possible adverse effects of opioids. All of the patient's questions and concerns were addressed. They were instructed to flush the remaining pills down the toilet if they did not need them for pain.
Cosentyx Counseling:  I discussed with the patient the risks of Cosentyx including but not limited to worsening of Crohn's disease, immunosuppression, allergic reactions and infections.  The patient understands that monitoring is required including a PPD at baseline and must alert us or the primary physician if symptoms of infection or other concerning signs are noted.
Olumiant Pregnancy And Lactation Text: Based on animal studies, Olumiant may cause embryo-fetal harm when administered to pregnant women.  The medication should not be used in pregnancy.  Breastfeeding is not recommended during treatment.
Cephalexin Pregnancy And Lactation Text: This medication is Pregnancy Category B and considered safe during pregnancy.  It is also excreted in breast milk but can be used safely for shorter doses.
Tranexamic Acid Pregnancy And Lactation Text: It is unknown if this medication is safe during pregnancy or breast feeding.
Niacinamide Counseling: I recommended taking niacin or niacinamide, also know as vitamin B3, twice daily. Recent evidence suggests that taking vitamin B3 (500 mg twice daily) can reduce the risk of actinic keratoses and non-melanoma skin cancers. Side effects of vitamin B3 include flushing and headache.
Tazorac Pregnancy And Lactation Text: This medication is not safe during pregnancy. It is unknown if this medication is excreted in breast milk.
Minocycline Counseling: Patient advised regarding possible photosensitivity and discoloration of the teeth, skin, lips, tongue and gums.  Patient instructed to avoid sunlight, if possible.  When exposed to sunlight, patients should wear protective clothing, sunglasses, and sunscreen.  The patient was instructed to call the office immediately if the following severe adverse effects occur:  hearing changes, easy bruising/bleeding, severe headache, or vision changes.  The patient verbalized understanding of the proper use and possible adverse effects of minocycline.  All of the patient's questions and concerns were addressed.
Terbinafine Counseling: Patient counseling regarding adverse effects of terbinafine including but not limited to headache, diarrhea, rash, upset stomach, liver function test abnormalities, itching, taste/smell disturbance, nausea, abdominal pain, and flatulence.  There is a rare possibility of liver failure that can occur when taking terbinafine.  The patient understands that a baseline LFT and kidney function test may be required. The patient verbalized understanding of the proper use and possible adverse effects of terbinafine.  All of the patient's questions and concerns were addressed.
Mirvaso Counseling: Mirvaso is a topical medication which can decrease superficial blood flow where applied. Side effects are uncommon and include stinging, redness and allergic reactions.
Otezla Pregnancy And Lactation Text: This medication is Pregnancy Category C and it isn't known if it is safe during pregnancy. It is unknown if it is excreted in breast milk.
Xolair Counseling:  Patient informed of potential adverse effects including but not limited to fever, muscle aches, rash and allergic reactions.  The patient verbalized understanding of the proper use and possible adverse effects of Xolair.  All of the patient's questions and concerns were addressed.
Calcipotriene Counseling:  I discussed with the patient the risks of calcipotriene including but not limited to erythema, scaling, itching, and irritation.
Dapsone Counseling: I discussed with the patient the risks of dapsone including but not limited to hemolytic anemia, agranulocytosis, rashes, methemoglobinemia, kidney failure, peripheral neuropathy, headaches, GI upset, and liver toxicity.  Patients who start dapsone require monitoring including baseline LFTs and weekly CBCs for the first month, then every month thereafter.  The patient verbalized understanding of the proper use and possible adverse effects of dapsone.  All of the patient's questions and concerns were addressed.
Dutasteride Male Counseling: Dustasteride Counseling:  I discussed with the patient the risks of use of dutasteride including but not limited to decreased libido, decreased ejaculate volume, and gynecomastia. Women who can become pregnant should not handle medication.  All of the patient's questions and concerns were addressed.
Cimzia Pregnancy And Lactation Text: This medication crosses the placenta but can be considered safe in certain situations. Cimzia may be excreted in breast milk.
Qbrexza Pregnancy And Lactation Text: There is no available data on Qbrexza use in pregnant women.  There is no available data on Qbrexza use in lactation.
Cephalexin Counseling: I counseled the patient regarding use of cephalexin as an antibiotic for prophylactic and/or therapeutic purposes. Cephalexin (commonly prescribed under brand name Keflex) is a cephalosporin antibiotic which is active against numerous classes of bacteria, including most skin bacteria. Side effects may include nausea, diarrhea, gastrointestinal upset, rash, hives, yeast infections, and in rare cases, hepatitis, kidney disease, seizures, fever, confusion, neurologic symptoms, and others. Patients with severe allergies to penicillin medications are cautioned that there is about a 10% incidence of cross-reactivity with cephalosporins. When possible, patients with penicillin allergies should use alternatives to cephalosporins for antibiotic therapy.
Albendazole Counseling:  I discussed with the patient the risks of albendazole including but not limited to cytopenia, kidney damage, nausea/vomiting and severe allergy.  The patient understands that this medication is being used in an off-label manner.
Olumiant Counseling: I discussed with the patient the risks of Olumiant therapy including but not limited to upper respiratory tract infections, shingles, cold sores, and nausea. Live vaccines should be avoided.  This medication has been linked to serious infections; higher rate of mortality; malignancy and lymphoproliferative disorders; major adverse cardiovascular events; thrombosis; gastrointestinal perforations; neutropenia; lymphopenia; anemia; liver enzyme elevations; and lipid elevations.
Eucrisa Counseling: Patient may experience a mild burning sensation during topical application. Eucrisa is not approved in children less than 2 years of age.
Skyrizi Counseling: I discussed with the patient the risks of risankizumab-rzaa including but not limited to immunosuppression, and serious infections.  The patient understands that monitoring is required including a PPD at baseline and must alert us or the primary physician if symptoms of infection or other concerning signs are noted.
Tranexamic Acid Counseling:  Patient advised of the small risk of bleeding problems with tranexamic acid. They were also instructed to call if they developed any nausea, vomiting or diarrhea. All of the patient's questions and concerns were addressed.
Spironolactone Pregnancy And Lactation Text: This medication can cause feminization of the male fetus and should be avoided during pregnancy. The active metabolite is also found in breast milk.
Wartpeel Counseling:  I discussed with the patient the risks of Wartpeel including but not limited to erythema, scaling, itching, weeping, crusting, and pain.
Low Dose Naltrexone Pregnancy And Lactation Text: Naltrexone is pregnancy category C.  There have been no adequate and well-controlled studies in pregnant women.  It should be used in pregnancy only if the potential benefit justifies the potential risk to the fetus.   Limited data indicates that naltrexone is minimally excreted into breastmilk.
Tazorac Counseling:  Patient advised that medication is irritating and drying.  Patient may need to apply sparingly and wash off after an hour before eventually leaving it on overnight.  The patient verbalized understanding of the proper use and possible adverse effects of tazorac.  All of the patient's questions and concerns were addressed.
Hyrimoz Counseling:  I discussed with the patient the risks of adalimumab including but not limited to myelosuppression, immunosuppression, autoimmune hepatitis, demyelinating diseases, lymphoma, and serious infections.  The patient understands that monitoring is required including a PPD at baseline and must alert us or the primary physician if symptoms of infection or other concerning signs are noted.
Metronidazole Pregnancy And Lactation Text: This medication is Pregnancy Category B and considered safe during pregnancy.  It is also excreted in breast milk.
Ilumya Counseling: I discussed with the patient the risks of tildrakizumab including but not limited to immunosuppression, malignancy, posterior leukoencephalopathy syndrome, and serious infections.  The patient understands that monitoring is required including a PPD at baseline and must alert us or the primary physician if symptoms of infection or other concerning signs are noted.
Otezla Counseling: The side effects of Otezla were discussed with the patient, including but not limited to worsening or new depression, weight loss, diarrhea, nausea, upper respiratory tract infection, and headache. Patient instructed to call the office should any adverse effect occur.  The patient verbalized understanding of the proper use and possible adverse effects of Otezla.  All the patient's questions and concerns were addressed.
Ketoconazole Pregnancy And Lactation Text: This medication is Pregnancy Category C and it isn't know if it is safe during pregnancy. It is also excreted in breast milk and breast feeding isn't recommended.
Tetracycline Counseling: Patient counseled regarding possible photosensitivity and increased risk for sunburn.  Patient instructed to avoid sunlight, if possible.  When exposed to sunlight, patients should wear protective clothing, sunglasses, and sunscreen.  The patient was instructed to call the office immediately if the following severe adverse effects occur:  hearing changes, easy bruising/bleeding, severe headache, or vision changes.  The patient verbalized understanding of the proper use and possible adverse effects of tetracycline.  All of the patient's questions and concerns were addressed. Patient understands to avoid pregnancy while on therapy due to potential birth defects.
Qbrexza Counseling:  I discussed with the patient the risks of Qbrexza including but not limited to headache, mydriasis, blurred vision, dry eyes, nasal dryness, dry mouth, dry throat, dry skin, urinary hesitation, and constipation.  Local skin reactions including erythema, burning, stinging, and itching can also occur.
Cimzia Counseling:  I discussed with the patient the risks of Cimzia including but not limited to immunosuppression, allergic reactions and infections.  The patient understands that monitoring is required including a PPD at baseline and must alert us or the primary physician if symptoms of infection or other concerning signs are noted.
High Dose Vitamin A Pregnancy And Lactation Text: High dose vitamin A therapy is contraindicated during pregnancy and breast feeding.
Bactrim Pregnancy And Lactation Text: This medication is Pregnancy Category D and is known to cause fetal risk.  It is also excreted in breast milk.
Low Dose Naltrexone Counseling- I discussed with the patient the potential risks and side effects of low dose naltrexone including but not limited to: more vivid dreams, headaches, nausea, vomiting, abdominal pain, fatigue, dizziness, and anxiety.
Topical Sulfur Applications Pregnancy And Lactation Text: This medication is Pregnancy Category C and has an unknown safety profile during pregnancy. It is unknown if this topical medication is excreted in breast milk.
Detail Level: Simple
Litfulo Pregnancy And Lactation Text: Based on animal studies, Lifulo may cause embryo-fetal harm when administered to pregnant women.  The medication should not be used in pregnancy.  Breastfeeding is not recommended during treatment.
Metronidazole Counseling:  I discussed with the patient the risks of metronidazole including but not limited to seizures, nausea/vomiting, a metallic taste in the mouth, nausea/vomiting and severe allergy.
Minoxidil Counseling: Minoxidil is a topical medication which can increase blood flow where it is applied. It is uncertain how this medication increases hair growth. Side effects are uncommon and include stinging and allergic reactions.
Hydroxyzine Pregnancy And Lactation Text: This medication is not safe during pregnancy and should not be taken. It is also excreted in breast milk and breast feeding isn't recommended.
Spironolactone Counseling: Patient advised regarding risks of diarrhea, abdominal pain, hyperkalemia, birth defects (for female patients), liver toxicity and renal toxicity. The patient may need blood work to monitor liver and kidney function and potassium levels while on therapy. The patient verbalized understanding of the proper use and possible adverse effects of spironolactone.  All of the patient's questions and concerns were addressed.
Zyclara Counseling:  I discussed with the patient the risks of imiquimod including but not limited to erythema, scaling, itching, weeping, crusting, and pain.  Patient understands that the inflammatory response to imiquimod is variable from person to person and was educated regarded proper titration schedule.  If flu-like symptoms develop, patient knows to discontinue the medication and contact us.
Oral Minoxidil Pregnancy And Lactation Text: This medication should only be used when clearly needed if you are pregnant, attempting to become pregnant or breast feeding.
Tremfya Counseling: I discussed with the patient the risks of guselkumab including but not limited to immunosuppression, serious infections, worsening of inflammatory bowel disease and drug reactions.  The patient understands that monitoring is required including a PPD at baseline and must alert us or the primary physician if symptoms of infection or other concerning signs are noted.
Carac Counseling:  I discussed with the patient the risks of Carac including but not limited to erythema, scaling, itching, weeping, crusting, and pain.
Colchicine Counseling:  Patient counseled regarding adverse effects including but not limited to stomach upset (nausea, vomiting, stomach pain, or diarrhea).  Patient instructed to limit alcohol consumption while taking this medication.  Colchicine may reduce blood counts especially with prolonged use.  The patient understands that monitoring of kidney function and blood counts may be required, especially at baseline. The patient verbalized understanding of the proper use and possible adverse effects of colchicine.  All of the patient's questions and concerns were addressed.
Topical Steroids Counseling: I discussed with the patient that prolonged use of topical steroids can result in the increased appearance of superficial blood vessels (telangiectasias), lightening (hypopigmentation) and thinning of the skin (atrophy).  Patient understands to avoid using high potency steroids in skin folds, the groin or the face.  The patient verbalized understanding of the proper use and possible adverse effects of topical steroids.  All of the patient's questions and concerns were addressed.
Ketoconazole Counseling:   Patient counseled regarding improving absorption with orange juice.  Adverse effects include but are not limited to breast enlargement, headache, diarrhea, nausea, upset stomach, liver function test abnormalities, taste disturbance, and stomach pain.  There is a rare possibility of liver failure that can occur when taking ketoconazole. The patient understands that monitoring of LFTs may be required, especially at baseline. The patient verbalized understanding of the proper use and possible adverse effects of ketoconazole.  All of the patient's questions and concerns were addressed.
Bimzelx Pregnancy And Lactation Text: This medication crosses the placenta and the safety is uncertain during pregnancy. It is unknown if this medication is present in breast milk.
Protopic Pregnancy And Lactation Text: This medication is Pregnancy Category C. It is unknown if this medication is excreted in breast milk when applied topically.
High Dose Vitamin A Counseling: Side effects reviewed, pt to contact office should one occur.

## 2024-09-10 NOTE — PROCEDURE: LIQUID NITROGEN
Consent: The patient's consent was obtained including but not limited to risks of crusting, scabbing, blistering, scarring, darker or lighter pigmentary change, recurrence, incomplete removal and infection.
Show Aperture Variable?: Yes
Duration Of Freeze Thaw-Cycle (Seconds): 30
Render Note In Bullet Format When Appropriate: No
Post-Care Instructions: I reviewed with the patient in detail post-care instructions. Patient is to wear sunprotection, and avoid picking at any of the treated lesions. Pt may apply Vaseline to crusted or scabbing areas.
Detail Level: Detailed
Number Of Freeze-Thaw Cycles: 1 freeze-thaw cycle

## 2024-09-10 NOTE — PROCEDURE: PRESCRIPTION MEDICATION MANAGEMENT
Detail Level: Zone
Plan: Patient treating AK’s with 5FU on forearms. Provided education to patient to continue to treat forearms as needed and to start treating back of neck as multiple AK’s were found on today’s visit. Patient has 5FU at home
Render In Strict Bullet Format?: No

## 2025-01-13 ENCOUNTER — APPOINTMENT (OUTPATIENT)
Dept: URBAN - METROPOLITAN AREA CLINIC 39 | Facility: CLINIC | Age: 75
Setting detail: DERMATOLOGY
End: 2025-01-13

## 2025-01-13 DIAGNOSIS — D18.0 HEMANGIOMA: ICD-10-CM

## 2025-01-13 DIAGNOSIS — L57.8 OTHER SKIN CHANGES DUE TO CHRONIC EXPOSURE TO NONIONIZING RADIATION: ICD-10-CM | Status: INADEQUATELY CONTROLLED

## 2025-01-13 DIAGNOSIS — L82.1 OTHER SEBORRHEIC KERATOSIS: ICD-10-CM

## 2025-01-13 DIAGNOSIS — D36.1 BENIGN NEOPLASM OF PERIPHERAL NERVES AND AUTONOMIC NERVOUS SYSTEM: ICD-10-CM

## 2025-01-13 DIAGNOSIS — L57.0 ACTINIC KERATOSIS: ICD-10-CM | Status: INADEQUATELY CONTROLLED

## 2025-01-13 DIAGNOSIS — Z71.89 OTHER SPECIFIED COUNSELING: ICD-10-CM

## 2025-01-13 DIAGNOSIS — Z85.828 PERSONAL HISTORY OF OTHER MALIGNANT NEOPLASM OF SKIN: ICD-10-CM

## 2025-01-13 DIAGNOSIS — D22 MELANOCYTIC NEVI: ICD-10-CM

## 2025-01-13 PROBLEM — D36.11 BENIGN NEOPLASM OF PERIPHERAL NERVES AND AUTONOMIC NERVOUS SYSTEM OF FACE, HEAD, AND NECK: Status: ACTIVE | Noted: 2025-01-13

## 2025-01-13 PROBLEM — D18.01 HEMANGIOMA OF SKIN AND SUBCUTANEOUS TISSUE: Status: ACTIVE | Noted: 2025-01-13

## 2025-01-13 PROBLEM — D22.5 MELANOCYTIC NEVI OF TRUNK: Status: ACTIVE | Noted: 2025-01-13

## 2025-01-13 PROCEDURE — 99214 OFFICE O/P EST MOD 30 MIN: CPT | Mod: 25

## 2025-01-13 PROCEDURE — ? LIQUID NITROGEN

## 2025-01-13 PROCEDURE — ? PRESCRIPTION MEDICATION MANAGEMENT

## 2025-01-13 PROCEDURE — ? SUNSCREEN RECOMMENDATIONS

## 2025-01-13 PROCEDURE — ? COUNSELING

## 2025-01-13 PROCEDURE — 17004 DESTROY PREMAL LESIONS 15/>: CPT

## 2025-01-13 PROCEDURE — ? PRESCRIPTION

## 2025-01-13 ASSESSMENT — LOCATION DETAILED DESCRIPTION DERM
LOCATION DETAILED: LEFT MEDIAL FOREHEAD
LOCATION DETAILED: RIGHT MEDIAL TEMPLE
LOCATION DETAILED: EPIGASTRIC SKIN
LOCATION DETAILED: RIGHT POSTERIOR NECK
LOCATION DETAILED: RIGHT RADIAL DORSAL HAND
LOCATION DETAILED: LEFT INFERIOR CENTRAL MALAR CHEEK
LOCATION DETAILED: LEFT SUPERIOR HELIX
LOCATION DETAILED: LEFT FOREHEAD
LOCATION DETAILED: RIGHT SUPERIOR POSTERIOR NECK
LOCATION DETAILED: LEFT SUPERIOR LATERAL NECK
LOCATION DETAILED: RIGHT SUPERIOR LATERAL NECK
LOCATION DETAILED: RIGHT PROXIMAL RADIAL DORSAL FOREARM
LOCATION DETAILED: RIGHT PROXIMAL DORSAL FOREARM
LOCATION DETAILED: SUPERIOR THORACIC SPINE
LOCATION DETAILED: LEFT POSTERIOR NECK
LOCATION DETAILED: RIGHT INFERIOR POSTERIOR NECK
LOCATION DETAILED: LEFT MEDIAL SUPERIOR CHEST
LOCATION DETAILED: LEFT MID-UPPER BACK
LOCATION DETAILED: LEFT DISTAL DORSAL FOREARM

## 2025-01-13 ASSESSMENT — LOCATION ZONE DERM
LOCATION ZONE: TRUNK
LOCATION ZONE: NECK
LOCATION ZONE: ARM
LOCATION ZONE: HAND
LOCATION ZONE: EAR
LOCATION ZONE: FACE

## 2025-01-13 ASSESSMENT — LOCATION SIMPLE DESCRIPTION DERM
LOCATION SIMPLE: UPPER BACK
LOCATION SIMPLE: RIGHT TEMPLE
LOCATION SIMPLE: LEFT CHEEK
LOCATION SIMPLE: LEFT UPPER BACK
LOCATION SIMPLE: CHEST
LOCATION SIMPLE: RIGHT HAND
LOCATION SIMPLE: LEFT FOREARM
LOCATION SIMPLE: LEFT FOREHEAD
LOCATION SIMPLE: POSTERIOR NECK
LOCATION SIMPLE: ABDOMEN
LOCATION SIMPLE: NECK
LOCATION SIMPLE: RIGHT FOREARM
LOCATION SIMPLE: LEFT EAR

## 2025-01-13 ASSESSMENT — TOTAL NUMBER OF LESIONS: # OF LESIONS?: 25

## 2025-01-13 ASSESSMENT — PAIN INTENSITY VAS
HOW INTENSE IS YOUR PAIN 0 BEING NO PAIN, 10 BEING THE MOST SEVERE PAIN POSSIBLE?: 3/10 PAIN
HOW INTENSE IS YOUR PAIN 0 BEING NO PAIN, 10 BEING THE MOST SEVERE PAIN POSSIBLE?: NO PAIN

## 2025-01-13 NOTE — PROCEDURE: LIQUID NITROGEN
Duration Of Freeze Thaw-Cycle (Seconds): 20
Render Post-Care Instructions In Note?: yes
Number Of Freeze-Thaw Cycles: 1 freeze-thaw cycle
Total Number Of Aks Treated: 25
Post-Care Instructions: I reviewed with the patient in detail post-care instructions. Patient is to wear sunprotection, and avoid picking at any of the treated lesions. Pt may apply Vaseline to crusted or scabbing areas.
Consent: The patient's consent was obtained including but not limited to risks of crusting, scabbing, blistering, scarring, darker or lighter pigmentary change, recurrence, incomplete removal and infection.
Detail Level: Zone
Render In Bullet Format When Appropriate: No

## 2025-01-13 NOTE — PROCEDURE: PRESCRIPTION MEDICATION MANAGEMENT
Initiate Treatment: Apply 5-FU + calcipotriene to sides and back of neck BID x4days (may treat in small sections)
Render In Strict Bullet Format?: No
Detail Level: Zone
Plan: Hypertrophic AKs treated today with LN2

## 2025-08-25 ENCOUNTER — APPOINTMENT (OUTPATIENT)
Dept: URBAN - METROPOLITAN AREA CLINIC 39 | Facility: CLINIC | Age: 75
Setting detail: DERMATOLOGY
End: 2025-08-25

## 2025-08-25 DIAGNOSIS — D22 MELANOCYTIC NEVI: ICD-10-CM

## 2025-08-25 DIAGNOSIS — L57.0 ACTINIC KERATOSIS: ICD-10-CM | Status: INADEQUATELY CONTROLLED

## 2025-08-25 DIAGNOSIS — D69.2 OTHER NONTHROMBOCYTOPENIC PURPURA: ICD-10-CM

## 2025-08-25 DIAGNOSIS — Z85.828 PERSONAL HISTORY OF OTHER MALIGNANT NEOPLASM OF SKIN: ICD-10-CM

## 2025-08-25 DIAGNOSIS — D18.0 HEMANGIOMA: ICD-10-CM

## 2025-08-25 DIAGNOSIS — L81.4 OTHER MELANIN HYPERPIGMENTATION: ICD-10-CM

## 2025-08-25 DIAGNOSIS — L57.8 OTHER SKIN CHANGES DUE TO CHRONIC EXPOSURE TO NONIONIZING RADIATION: ICD-10-CM | Status: INADEQUATELY CONTROLLED

## 2025-08-25 DIAGNOSIS — L82.1 OTHER SEBORRHEIC KERATOSIS: ICD-10-CM

## 2025-08-25 PROBLEM — D22.62 MELANOCYTIC NEVI OF LEFT UPPER LIMB, INCLUDING SHOULDER: Status: ACTIVE | Noted: 2025-08-25

## 2025-08-25 PROBLEM — D22.39 MELANOCYTIC NEVI OF OTHER PARTS OF FACE: Status: ACTIVE | Noted: 2025-08-25

## 2025-08-25 PROBLEM — D22.61 MELANOCYTIC NEVI OF RIGHT UPPER LIMB, INCLUDING SHOULDER: Status: ACTIVE | Noted: 2025-08-25

## 2025-08-25 PROBLEM — D22.71 MELANOCYTIC NEVI OF RIGHT LOWER LIMB, INCLUDING HIP: Status: ACTIVE | Noted: 2025-08-25

## 2025-08-25 PROBLEM — D22.5 MELANOCYTIC NEVI OF TRUNK: Status: ACTIVE | Noted: 2025-08-25

## 2025-08-25 PROBLEM — D22.72 MELANOCYTIC NEVI OF LEFT LOWER LIMB, INCLUDING HIP: Status: ACTIVE | Noted: 2025-08-25

## 2025-08-25 PROBLEM — D18.01 HEMANGIOMA OF SKIN AND SUBCUTANEOUS TISSUE: Status: ACTIVE | Noted: 2025-08-25

## 2025-08-25 PROCEDURE — ? COUNSELING

## 2025-08-25 PROCEDURE — ? LIQUID NITROGEN

## 2025-08-25 PROCEDURE — ? PRESCRIPTION

## 2025-08-25 PROCEDURE — ? SUNSCREEN RECOMMENDATIONS

## 2025-08-25 PROCEDURE — ? PRESCRIPTION MEDICATION MANAGEMENT

## 2025-08-25 ASSESSMENT — LOCATION DETAILED DESCRIPTION DERM
LOCATION DETAILED: EPIGASTRIC SKIN
LOCATION DETAILED: LEFT INFERIOR POSTERIOR NECK
LOCATION DETAILED: LEFT SUPERIOR HELIX
LOCATION DETAILED: RIGHT DISTAL DORSAL FOREARM
LOCATION DETAILED: NASAL TIP
LOCATION DETAILED: LEFT SUPERIOR LATERAL NECK
LOCATION DETAILED: SUPERIOR THORACIC SPINE
LOCATION DETAILED: LEFT CENTRAL MALAR CHEEK
LOCATION DETAILED: RIGHT ANTERIOR SHOULDER
LOCATION DETAILED: LEFT INCISURA INTERTRAGICA
LOCATION DETAILED: LEFT DISTAL DORSAL FOREARM
LOCATION DETAILED: LEFT MID-UPPER BACK
LOCATION DETAILED: RIGHT ANTERIOR PROXIMAL THIGH
LOCATION DETAILED: RIGHT ANTITRAGUS
LOCATION DETAILED: LEFT ANTERIOR PROXIMAL THIGH
LOCATION DETAILED: LEFT ANTERIOR SHOULDER

## 2025-08-25 ASSESSMENT — LOCATION SIMPLE DESCRIPTION DERM
LOCATION SIMPLE: ABDOMEN
LOCATION SIMPLE: POSTERIOR NECK
LOCATION SIMPLE: RIGHT SHOULDER
LOCATION SIMPLE: RIGHT THIGH
LOCATION SIMPLE: LEFT SHOULDER
LOCATION SIMPLE: LEFT EAR
LOCATION SIMPLE: LEFT FOREARM
LOCATION SIMPLE: LEFT ANTERIOR NECK
LOCATION SIMPLE: UPPER BACK
LOCATION SIMPLE: LEFT CHEEK
LOCATION SIMPLE: LEFT THIGH
LOCATION SIMPLE: NOSE
LOCATION SIMPLE: LEFT UPPER BACK
LOCATION SIMPLE: RIGHT EAR
LOCATION SIMPLE: RIGHT FOREARM

## 2025-08-25 ASSESSMENT — PAIN INTENSITY VAS: HOW INTENSE IS YOUR PAIN 0 BEING NO PAIN, 10 BEING THE MOST SEVERE PAIN POSSIBLE?: NO PAIN

## 2025-08-25 ASSESSMENT — LOCATION ZONE DERM
LOCATION ZONE: TRUNK
LOCATION ZONE: EAR
LOCATION ZONE: LEG
LOCATION ZONE: ARM
LOCATION ZONE: NECK
LOCATION ZONE: FACE
LOCATION ZONE: NOSE

## 2025-08-25 ASSESSMENT — BSA RASH: BSA RASH: 2

## 2025-08-25 ASSESSMENT — SEVERITY ASSESSMENT: SEVERITY: MILD
